# Patient Record
Sex: MALE | Race: BLACK OR AFRICAN AMERICAN | NOT HISPANIC OR LATINO | Employment: UNEMPLOYED | URBAN - NONMETROPOLITAN AREA
[De-identification: names, ages, dates, MRNs, and addresses within clinical notes are randomized per-mention and may not be internally consistent; named-entity substitution may affect disease eponyms.]

---

## 2022-08-04 ENCOUNTER — APPOINTMENT (OUTPATIENT)
Dept: RADIOLOGY | Facility: CLINIC | Age: 32
End: 2022-08-04
Attending: NURSE PRACTITIONER
Payer: MEDICARE

## 2022-08-04 ENCOUNTER — OFFICE VISIT (OUTPATIENT)
Dept: FAMILY MEDICINE | Facility: CLINIC | Age: 32
End: 2022-08-04
Payer: MEDICARE

## 2022-08-04 VITALS
HEART RATE: 89 BPM | SYSTOLIC BLOOD PRESSURE: 114 MMHG | BODY MASS INDEX: 26.66 KG/M2 | OXYGEN SATURATION: 96 % | WEIGHT: 180 LBS | HEIGHT: 69 IN | TEMPERATURE: 98 F | DIASTOLIC BLOOD PRESSURE: 70 MMHG

## 2022-08-04 DIAGNOSIS — R07.81 RIB PAIN ON RIGHT SIDE: ICD-10-CM

## 2022-08-04 DIAGNOSIS — R07.81 RIB PAIN ON RIGHT SIDE: Primary | ICD-10-CM

## 2022-08-04 PROCEDURE — 99203 OFFICE O/P NEW LOW 30 MIN: CPT | Mod: ,,, | Performed by: NURSE PRACTITIONER

## 2022-08-04 PROCEDURE — 71100 XR RIBS 2 VIEW RIGHT: ICD-10-PCS | Mod: 26,RT,, | Performed by: RADIOLOGY

## 2022-08-04 PROCEDURE — 71100 X-RAY EXAM RIBS UNI 2 VIEWS: CPT | Mod: TC,RHCUB,RT | Performed by: NURSE PRACTITIONER

## 2022-08-04 PROCEDURE — 99203 PR OFFICE/OUTPT VISIT, NEW, LEVL III, 30-44 MIN: ICD-10-PCS | Mod: ,,, | Performed by: NURSE PRACTITIONER

## 2022-08-04 PROCEDURE — 71100 X-RAY EXAM RIBS UNI 2 VIEWS: CPT | Mod: 26,RT,, | Performed by: RADIOLOGY

## 2022-08-04 RX ORDER — ONDANSETRON 4 MG/1
4 TABLET, FILM COATED ORAL 3 TIMES DAILY
COMMUNITY
Start: 2022-07-19

## 2022-08-04 RX ORDER — GABAPENTIN 300 MG/1
300 CAPSULE ORAL 3 TIMES DAILY
COMMUNITY
Start: 2022-07-11

## 2022-08-04 RX ORDER — PREDNISONE 10 MG/1
10 TABLET ORAL DAILY
COMMUNITY
Start: 2022-07-29

## 2022-08-04 RX ORDER — QUETIAPINE FUMARATE 200 MG/1
200 TABLET, FILM COATED ORAL NIGHTLY
COMMUNITY
Start: 2022-07-11

## 2022-08-04 RX ORDER — NAPROXEN 500 MG/1
500 TABLET ORAL 2 TIMES DAILY PRN
Qty: 30 TABLET | Refills: 0 | Status: SHIPPED | OUTPATIENT
Start: 2022-08-04

## 2022-08-04 NOTE — LETTER
August 4, 2022      [facility]  [address] [facility] [address]  Phone: [phone]  Fax: [phone]       Patient: Flako Pedroza   YOB: 1990  Date of Visit: 08/04/2022    To Whom It May Concern:    Shani Pedroza  was at [facility] on 08/04/2022. The patient may return to work/school on 08/08/2022 with no restrictions. If you have any questions or concerns, or if I can be of further assistance, please do not hesitate to contact me.    Sincerely,    Romy Arriola CMA
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
## 2022-08-18 ENCOUNTER — OFFICE VISIT (OUTPATIENT)
Dept: FAMILY MEDICINE | Facility: CLINIC | Age: 32
End: 2022-08-18
Payer: MEDICARE

## 2022-08-18 VITALS
DIASTOLIC BLOOD PRESSURE: 62 MMHG | WEIGHT: 184 LBS | HEIGHT: 69 IN | OXYGEN SATURATION: 96 % | BODY MASS INDEX: 27.25 KG/M2 | SYSTOLIC BLOOD PRESSURE: 134 MMHG | HEART RATE: 102 BPM | TEMPERATURE: 99 F

## 2022-08-18 DIAGNOSIS — R07.81 RIB PAIN: ICD-10-CM

## 2022-08-18 DIAGNOSIS — R07.89 ATYPICAL CHEST PAIN: Primary | ICD-10-CM

## 2022-08-18 DIAGNOSIS — D64.9 ANEMIA, UNSPECIFIED TYPE: ICD-10-CM

## 2022-08-18 PROCEDURE — 96372 PR INJECTION,THERAP/PROPH/DIAG2ST, IM OR SUBCUT: ICD-10-PCS | Mod: ,,, | Performed by: NURSE PRACTITIONER

## 2022-08-18 PROCEDURE — 99213 OFFICE O/P EST LOW 20 MIN: CPT | Mod: ,,, | Performed by: NURSE PRACTITIONER

## 2022-08-18 PROCEDURE — 99213 PR OFFICE/OUTPT VISIT, EST, LEVL III, 20-29 MIN: ICD-10-PCS | Mod: ,,, | Performed by: NURSE PRACTITIONER

## 2022-08-18 PROCEDURE — 96372 THER/PROPH/DIAG INJ SC/IM: CPT | Mod: ,,, | Performed by: NURSE PRACTITIONER

## 2022-08-18 RX ORDER — KETOROLAC TROMETHAMINE 30 MG/ML
60 INJECTION, SOLUTION INTRAMUSCULAR; INTRAVENOUS
Status: COMPLETED | OUTPATIENT
Start: 2022-08-18 | End: 2022-08-18

## 2022-08-18 RX ORDER — TRIAMCINOLONE ACETONIDE 40 MG/ML
40 INJECTION, SUSPENSION INTRA-ARTICULAR; INTRAMUSCULAR
Status: COMPLETED | OUTPATIENT
Start: 2022-08-18 | End: 2022-08-18

## 2022-08-18 RX ORDER — KETOROLAC TROMETHAMINE 10 MG/1
10 TABLET, FILM COATED ORAL EVERY 6 HOURS PRN
Qty: 20 TABLET | Refills: 0 | Status: SHIPPED | OUTPATIENT
Start: 2022-08-18 | End: 2022-08-23

## 2022-08-18 RX ORDER — FERROUS SULFATE 325(65) MG
325 TABLET, DELAYED RELEASE (ENTERIC COATED) ORAL
Qty: 30 TABLET | Refills: 0 | Status: SHIPPED | OUTPATIENT
Start: 2022-08-19

## 2022-08-18 RX ADMIN — KETOROLAC TROMETHAMINE 60 MG: 30 INJECTION, SOLUTION INTRAMUSCULAR; INTRAVENOUS at 03:08

## 2022-08-18 RX ADMIN — TRIAMCINOLONE ACETONIDE 40 MG: 40 INJECTION, SUSPENSION INTRA-ARTICULAR; INTRAMUSCULAR at 03:08
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
## 2022-08-18 NOTE — LETTER
August 18, 2022      [facility]  [address] [facility] [address]  Phone: [phone]  Fax: [phone]       Patient: Flako Pedroza   YOB: 1990  Date of Visit: 08/18/2022    To Whom It May Concern:    Shani Pedroza  was at [facility] on 08/18/2022. The patient may return to work/school on 08/22/2022 with no restrictions. If you have any questions or concerns, or if I can be of further assistance, please do not hesitate to contact me.    Sincerely,    Romy Arriola CMA

## 2022-08-26 ENCOUNTER — TELEPHONE (OUTPATIENT)
Dept: FAMILY MEDICINE | Facility: CLINIC | Age: 32
End: 2022-08-26
Payer: MEDICARE

## 2022-08-26 NOTE — TELEPHONE ENCOUNTER
Called no answer no return call. Was not seen by JANIE Gifford for STD. Was seen by Stephany Callaway NP. Patient need to contact [facility] office for further evaluation.

## 2022-08-26 NOTE — TELEPHONE ENCOUNTER
----- Message from Amirah Campos MA sent at 8/25/2022  9:48 AM CDT -----  Patient wants to know if he can come in for a pennicillin shot from a STD was seen by Zay [phone]

## 2023-01-20 ENCOUNTER — HOSPITAL ENCOUNTER (EMERGENCY)
Facility: HOSPITAL | Age: 33
Discharge: HOME | End: 2023-01-20
Attending: EMERGENCY MEDICINE | Admitting: EMERGENCY MEDICINE
Payer: MEDICARE

## 2023-01-20 ENCOUNTER — APPOINTMENT (OUTPATIENT)
Dept: CT IMAGING | Facility: HOSPITAL | Age: 33
End: 2023-01-20
Payer: MEDICARE

## 2023-01-20 VITALS
TEMPERATURE: 98 F | OXYGEN SATURATION: 98 % | DIASTOLIC BLOOD PRESSURE: 78 MMHG | HEART RATE: 77 BPM | SYSTOLIC BLOOD PRESSURE: 114 MMHG | HEIGHT: 69 IN | BODY MASS INDEX: 25.83 KG/M2 | RESPIRATION RATE: 16 BRPM | WEIGHT: 174.38 LBS

## 2023-01-20 DIAGNOSIS — R11.2 NAUSEA AND VOMITING, UNSPECIFIED VOMITING TYPE: ICD-10-CM

## 2023-01-20 DIAGNOSIS — R19.7 DIARRHEA, UNSPECIFIED TYPE: ICD-10-CM

## 2023-01-20 DIAGNOSIS — R10.30 LOWER ABDOMINAL PAIN: ICD-10-CM

## 2023-01-20 DIAGNOSIS — K50.019 CROHN'S DISEASE OF SMALL INTESTINE WITH COMPLICATION: Primary | ICD-10-CM

## 2023-01-20 LAB
ALBUMIN SERPL-MCNC: 3 G/DL (ref 3.5–5.2)
ALBUMIN/GLOB SERPL: 1 G/DL
ALP SERPL-CCNC: 126 U/L (ref 39–117)
ALT SERPL W P-5'-P-CCNC: 10 U/L (ref 1–41)
ANION GAP SERPL CALCULATED.3IONS-SCNC: 8.3 MMOL/L (ref 5–15)
AST SERPL-CCNC: 11 U/L (ref 1–40)
BASOPHILS # BLD AUTO: 0.01 10*3/MM3 (ref 0–0.2)
BASOPHILS NFR BLD AUTO: 0.2 % (ref 0–1.5)
BILIRUB SERPL-MCNC: 0.3 MG/DL (ref 0–1.2)
BILIRUB UR QL STRIP: NEGATIVE
BUN SERPL-MCNC: 19 MG/DL (ref 6–20)
BUN/CREAT SERPL: 14.4 (ref 7–25)
CALCIUM SPEC-SCNC: 8.4 MG/DL (ref 8.6–10.5)
CHLORIDE SERPL-SCNC: 104 MMOL/L (ref 98–107)
CLARITY UR: CLEAR
CO2 SERPL-SCNC: 26.7 MMOL/L (ref 22–29)
COLOR UR: YELLOW
CREAT SERPL-MCNC: 1.32 MG/DL (ref 0.76–1.27)
DEPRECATED RDW RBC AUTO: 52.8 FL (ref 37–54)
EGFRCR SERPLBLD CKD-EPI 2021: 72.6 ML/MIN/1.73
EOSINOPHIL # BLD AUTO: 0.04 10*3/MM3 (ref 0–0.4)
EOSINOPHIL NFR BLD AUTO: 0.6 % (ref 0.3–6.2)
ERYTHROCYTE [DISTWIDTH] IN BLOOD BY AUTOMATED COUNT: 14.9 % (ref 12.3–15.4)
GLOBULIN UR ELPH-MCNC: 3 GM/DL
GLUCOSE SERPL-MCNC: 113 MG/DL (ref 65–99)
GLUCOSE UR STRIP-MCNC: NEGATIVE MG/DL
HCT VFR BLD AUTO: 39.9 % (ref 37.5–51)
HGB BLD-MCNC: 13.1 G/DL (ref 13–17.7)
HGB UR QL STRIP.AUTO: NEGATIVE
HOLD SPECIMEN: NORMAL
HOLD SPECIMEN: NORMAL
IMM GRANULOCYTES # BLD AUTO: 0.02 10*3/MM3 (ref 0–0.05)
IMM GRANULOCYTES NFR BLD AUTO: 0.3 % (ref 0–0.5)
KETONES UR QL STRIP: ABNORMAL
LEUKOCYTE ESTERASE UR QL STRIP.AUTO: NEGATIVE
LIPASE SERPL-CCNC: 6 U/L (ref 13–60)
LYMPHOCYTES # BLD AUTO: 1.33 10*3/MM3 (ref 0.7–3.1)
LYMPHOCYTES NFR BLD AUTO: 21.5 % (ref 19.6–45.3)
MCH RBC QN AUTO: 31.4 PG (ref 26.6–33)
MCHC RBC AUTO-ENTMCNC: 32.8 G/DL (ref 31.5–35.7)
MCV RBC AUTO: 95.7 FL (ref 79–97)
MONOCYTES # BLD AUTO: 0.74 10*3/MM3 (ref 0.1–0.9)
MONOCYTES NFR BLD AUTO: 11.9 % (ref 5–12)
NEUTROPHILS NFR BLD AUTO: 4.06 10*3/MM3 (ref 1.7–7)
NEUTROPHILS NFR BLD AUTO: 65.5 % (ref 42.7–76)
NITRITE UR QL STRIP: NEGATIVE
NRBC BLD AUTO-RTO: 0 /100 WBC (ref 0–0.2)
PH UR STRIP.AUTO: 5.5 [PH] (ref 5–8)
PLATELET # BLD AUTO: 395 10*3/MM3 (ref 140–450)
PMV BLD AUTO: 8.6 FL (ref 6–12)
POTASSIUM SERPL-SCNC: 4.1 MMOL/L (ref 3.5–5.2)
PROT SERPL-MCNC: 6 G/DL (ref 6–8.5)
PROT UR QL STRIP: NEGATIVE
RBC # BLD AUTO: 4.17 10*6/MM3 (ref 4.14–5.8)
SODIUM SERPL-SCNC: 139 MMOL/L (ref 136–145)
SP GR UR STRIP: >1.03 (ref 1–1.03)
UROBILINOGEN UR QL STRIP: ABNORMAL
WBC NRBC COR # BLD: 6.2 10*3/MM3 (ref 3.4–10.8)
WHOLE BLOOD HOLD COAG: NORMAL
WHOLE BLOOD HOLD SPECIMEN: NORMAL

## 2023-01-20 PROCEDURE — 0 IOPAMIDOL PER 1 ML: Performed by: EMERGENCY MEDICINE

## 2023-01-20 PROCEDURE — 36415 COLL VENOUS BLD VENIPUNCTURE: CPT

## 2023-01-20 PROCEDURE — 83690 ASSAY OF LIPASE: CPT

## 2023-01-20 PROCEDURE — 25010000002 ONDANSETRON PER 1 MG: Performed by: NURSE PRACTITIONER

## 2023-01-20 PROCEDURE — 96375 TX/PRO/DX INJ NEW DRUG ADDON: CPT

## 2023-01-20 PROCEDURE — 96374 THER/PROPH/DIAG INJ IV PUSH: CPT

## 2023-01-20 PROCEDURE — 99283 EMERGENCY DEPT VISIT LOW MDM: CPT

## 2023-01-20 PROCEDURE — 80053 COMPREHEN METABOLIC PANEL: CPT

## 2023-01-20 PROCEDURE — 74177 CT ABD & PELVIS W/CONTRAST: CPT

## 2023-01-20 PROCEDURE — 63710000001 PREDNISONE PER 1 MG: Performed by: EMERGENCY MEDICINE

## 2023-01-20 PROCEDURE — 25010000002 MORPHINE PER 10 MG: Performed by: NURSE PRACTITIONER

## 2023-01-20 PROCEDURE — 81003 URINALYSIS AUTO W/O SCOPE: CPT

## 2023-01-20 PROCEDURE — 25010000002 HYDROMORPHONE 1 MG/ML SOLUTION: Performed by: EMERGENCY MEDICINE

## 2023-01-20 PROCEDURE — 85025 COMPLETE CBC W/AUTO DIFF WBC: CPT

## 2023-01-20 RX ORDER — TRAZODONE HYDROCHLORIDE 100 MG/1
300 TABLET ORAL NIGHTLY
COMMUNITY

## 2023-01-20 RX ORDER — METRONIDAZOLE 500 MG/1
500 TABLET ORAL 2 TIMES DAILY
Qty: 14 TABLET | Refills: 0 | Status: SHIPPED | OUTPATIENT
Start: 2023-01-20 | End: 2023-03-15

## 2023-01-20 RX ORDER — QUETIAPINE FUMARATE 300 MG/1
300 TABLET, FILM COATED ORAL NIGHTLY
COMMUNITY

## 2023-01-20 RX ORDER — OMEPRAZOLE 40 MG/1
40 CAPSULE, DELAYED RELEASE ORAL DAILY
COMMUNITY

## 2023-01-20 RX ORDER — ONDANSETRON 4 MG/1
4 TABLET, ORALLY DISINTEGRATING ORAL EVERY 8 HOURS PRN
Qty: 15 TABLET | Refills: 0 | Status: SHIPPED | OUTPATIENT
Start: 2023-01-20

## 2023-01-20 RX ORDER — BUPRENORPHINE HYDROCHLORIDE AND NALOXONE HYDROCHLORIDE DIHYDRATE 8; 2 MG/1; MG/1
1 TABLET SUBLINGUAL DAILY
COMMUNITY
End: 2023-03-15

## 2023-01-20 RX ORDER — ONDANSETRON 2 MG/ML
4 INJECTION INTRAMUSCULAR; INTRAVENOUS ONCE
Status: COMPLETED | OUTPATIENT
Start: 2023-01-20 | End: 2023-01-20

## 2023-01-20 RX ORDER — PREDNISONE 20 MG/1
TABLET ORAL
Qty: 34 TABLET | Refills: 0 | Status: SHIPPED | OUTPATIENT
Start: 2023-01-20 | End: 2023-02-10

## 2023-01-20 RX ORDER — LANOLIN ALCOHOL/MO/W.PET/CERES
6 CREAM (GRAM) TOPICAL NIGHTLY
COMMUNITY

## 2023-01-20 RX ORDER — VENLAFAXINE 75 MG/1
75 TABLET ORAL DAILY
COMMUNITY
End: 2023-03-15

## 2023-01-20 RX ORDER — FAMOTIDINE 40 MG/1
40 TABLET, FILM COATED ORAL DAILY
COMMUNITY
End: 2023-03-15

## 2023-01-20 RX ORDER — SODIUM CHLORIDE 0.9 % (FLUSH) 0.9 %
10 SYRINGE (ML) INJECTION AS NEEDED
Status: DISCONTINUED | OUTPATIENT
Start: 2023-01-20 | End: 2023-01-20 | Stop reason: HOSPADM

## 2023-01-20 RX ORDER — HYDROXYZINE PAMOATE 50 MG/1
50 CAPSULE ORAL DAILY
COMMUNITY

## 2023-01-20 RX ORDER — CIPROFLOXACIN 500 MG/1
500 TABLET, FILM COATED ORAL EVERY 12 HOURS
Qty: 14 TABLET | Refills: 0 | Status: SHIPPED | OUTPATIENT
Start: 2023-01-20 | End: 2023-03-15

## 2023-01-20 RX ORDER — GABAPENTIN 600 MG/1
600 TABLET ORAL 3 TIMES DAILY
COMMUNITY

## 2023-01-20 RX ORDER — CLONIDINE HYDROCHLORIDE 0.2 MG/1
0.2 TABLET ORAL 2 TIMES DAILY PRN
COMMUNITY

## 2023-01-20 RX ORDER — OLANZAPINE 15 MG/1
15 TABLET ORAL NIGHTLY
COMMUNITY
End: 2023-03-15

## 2023-01-20 RX ORDER — ONDANSETRON 4 MG/1
4 TABLET, FILM COATED ORAL EVERY 8 HOURS PRN
COMMUNITY
End: 2023-01-20

## 2023-01-20 RX ORDER — PREDNISONE 20 MG/1
60 TABLET ORAL ONCE
Status: COMPLETED | OUTPATIENT
Start: 2023-01-20 | End: 2023-01-20

## 2023-01-20 RX ADMIN — IOPAMIDOL 100 ML: 755 INJECTION, SOLUTION INTRAVENOUS at 12:23

## 2023-01-20 RX ADMIN — PREDNISONE 60 MG: 20 TABLET ORAL at 16:32

## 2023-01-20 RX ADMIN — HYDROMORPHONE HYDROCHLORIDE 0.5 MG: 1 INJECTION, SOLUTION INTRAMUSCULAR; INTRAVENOUS; SUBCUTANEOUS at 14:48

## 2023-01-20 RX ADMIN — ONDANSETRON 4 MG: 2 INJECTION INTRAMUSCULAR; INTRAVENOUS at 11:42

## 2023-01-20 RX ADMIN — SODIUM CHLORIDE 1000 ML: 9 INJECTION, SOLUTION INTRAVENOUS at 11:41

## 2023-01-20 RX ADMIN — MORPHINE SULFATE 4 MG: 4 INJECTION, SOLUTION INTRAMUSCULAR; INTRAVENOUS at 11:41

## 2023-01-20 NOTE — DISCHARGE INSTRUCTIONS
I am sending you home with oral antibiotics as well as steroids.  Please do a very bland diet or clear liquids for the next couple of days.  Avoid any spicy, greasy, fried or fatty foods.  Drink plenty of fluids and stay well-hydrated.  I spoke with the gastroenterologist who agrees with this plan and expects for you to follow-up with him in his office for further evaluation and management of your Crohn's disease.  Continue your home medications.  I have additionally sent in medications for you to take for treatment.  Return to the emergency department with any new or worsening symptoms.

## 2023-01-20 NOTE — ED PROVIDER NOTES
Time: 11:10 AM EST  Date of encounter:  1/20/2023  Independent Historian/Clinical History and Information was obtained by:   Patient, Chart and Nursing Staff  Chief Complaint: abdominal pain    History is limited by: N/A    History of Present Illness:  Patient is a 32 y.o. year old male with a history of Crohn's disease who presents to the emergency department for evaluation of abdominal pain.    The patient reports he has been experiencing abdominal pain with associated diarrhea and vomiting. He points to his lower abdomen as having the worst pain. He is not followed by a GI specialist for his Crohn's, but states he does take medications. He has been taking Zofran from antiemetic effect. He has not taken his temperature, but reports a subjective fever yesterday. Per bedside nurse, the patient takes omeprazole and famotidine. He is also on suboxone, his last dose having been last night.          Patient Care Team  Primary Care Provider: Provider, No Known    Past Medical History:     No Known Allergies  Past Medical History:   Diagnosis Date   • Crohn's disease (HCC)      Past Surgical History:   Procedure Laterality Date   • COLON SURGERY     • STOMACH SURGERY       History reviewed. No pertinent family history.    Home Medications:  Prior to Admission medications    Not on File        Social History:   Social History     Tobacco Use   • Smoking status: Every Day     Packs/day: 0.50     Types: Cigarettes   Substance Use Topics   • Alcohol use: Not Currently   • Drug use: Not Currently         Review of Systems:  Review of Systems   Constitutional: Positive for fever (subjective). Negative for chills and fatigue.   HENT: Negative for rhinorrhea and sore throat.    Eyes: Negative.    Respiratory: Negative for cough, chest tightness and shortness of breath.    Cardiovascular: Negative for chest pain and palpitations.   Gastrointestinal: Positive for abdominal pain, diarrhea, nausea and vomiting.   Endocrine: Negative.  "   Genitourinary: Negative for decreased urine volume, difficulty urinating, flank pain, frequency, hematuria and urgency.   Musculoskeletal: Negative for arthralgias and myalgias.   Skin: Negative for color change, rash and wound.   Allergic/Immunologic: Negative.    Neurological: Negative for dizziness, syncope, weakness, light-headedness and headaches.   Hematological: Negative.    Psychiatric/Behavioral: Negative for agitation and confusion. The patient is not nervous/anxious.         Physical Exam:  /78 (BP Location: Right arm, Patient Position: Lying)   Pulse 77   Temp 98 °F (36.7 °C) (Oral)   Resp 16   Ht 175.3 cm (69\")   Wt 79.1 kg (174 lb 6.1 oz)   SpO2 98%   BMI 25.75 kg/m²     Physical Exam  Vitals and nursing note reviewed.   Constitutional:       General: He is not in acute distress.     Appearance: Normal appearance. He is not ill-appearing.   HENT:      Head: Normocephalic and atraumatic.      Nose: Nose normal.   Eyes:      Extraocular Movements: Extraocular movements intact.      Pupils: Pupils are equal, round, and reactive to light.   Cardiovascular:      Rate and Rhythm: Normal rate and regular rhythm.      Pulses: Normal pulses.      Heart sounds: Normal heart sounds. No murmur heard.    No gallop.   Pulmonary:      Effort: Pulmonary effort is normal. No respiratory distress.      Breath sounds: Normal breath sounds. No wheezing, rhonchi or rales.   Chest:      Chest wall: No tenderness.   Abdominal:      General: Bowel sounds are normal. There is no distension.      Palpations: Abdomen is soft.      Tenderness: There is generalized abdominal tenderness (Diffuse tenderness, worst in the lower quadrants). There is no guarding or rebound.   Musculoskeletal:         General: No tenderness. Normal range of motion.      Cervical back: Normal range of motion and neck supple.   Skin:     General: Skin is warm and dry.      Findings: No erythema or rash.   Neurological:      Mental Status: " He is alert and oriented to person, place, and time.      Motor: No weakness.   Psychiatric:         Mood and Affect: Mood normal.         Behavior: Behavior normal.                  Procedures:  Procedures      Medical Decision Making:      Comorbidities that affect care:     Crohn's, History of Substance Use    External Notes reviewed:    None      The following orders were placed and all results were independently analyzed by me:  Orders Placed This Encounter   Procedures   • Gastrointestinal Panel, PCR - Stool, Per Rectum   • Clostridioides difficile Toxin - Stool, Per Rectum   • CT Abdomen Pelvis With Contrast   • [place] Draw   • Comprehensive Metabolic Panel   • Lipase   • Urinalysis With Microscopic If Indicated (No Culture) - Urine, Clean Catch   • CBC Auto Differential   • Ambulatory Referral to Gastroenterology   • Undress & Gown   • CBC & Differential   • Green Top (Gel)   • Lavender Top   • Gold Top - SST   • Light Blue Top       Medications Given in the Emergency Department:  Medications   sodium chloride 0.9 % bolus 1,000 mL (0 mL Intravenous Stopped 1/20/23 1435)   morphine injection 4 mg (4 mg Intravenous Given 1/20/23 1141)   ondansetron (ZOFRAN) injection 4 mg (4 mg Intravenous Given 1/20/23 1142)   iopamidol (ISOVUE-370) 76 % injection 100 mL (100 mL Intravenous Given 1/20/23 1223)   HYDROmorphone (DILAUDID) injection 0.5 mg (0.5 mg Intravenous Given 1/20/23 1448)   predniSONE (DELTASONE) tablet 60 mg (60 mg Oral Given 1/20/23 1632)        ED Course:    ED Course as of 01/21/23 1030   Fri Jan 20, 2023   1440 CBC is normal. [AR]   1441 CMP shows mildly elevated creatinine at 1.32.  No previous labs to compare to.  Otherwise overall unremarkable.    Lipase is unremarkable. [AR]   1441 CT of the abdomen and pelvis with contrast revealsFluid-filled distended loops of small bowel throughout the abdomen to the level of the terminal ileum where there is bowel wall thickening and surgical clips related  to prior small bowel resection.  Findings would be most consistent with partial small bowel obstruction.  The thickened terminal ileum would be suspicious for inflammatory bowel disease [AR]   1442 Surgery consulted at this time. [AR]   1513 Spoke with on-call general surgeon, Dr. Mojica, who recommends patient follow-up with GI at this time.  No surgical intervention required based on CT findings from today's visit. [AR]   1555 Spoke with on-call gastroenterologist, Dr. Mason, who agrees with plan of care to send patient home on oral steroids and oral antibiotics and follow-up with him in the office next week.  Additionally, he did recommend ordering stool cultures along with C. difficile prior to discharge.  No further recommendations at this time. [AR]      ED Course User Index  [AR] Janel Ernandez, ZACK       Labs:    Lab Results (last 24 hours)     Procedure Component Value Units Date/Time    CBC & Differential [[id number]]  (Normal) Collected: 01/20/23 1035    Specimen: Blood Updated: 01/20/23 1040    Narrative:      The following orders were created for panel order CBC & Differential.  Procedure                               Abnormality         Status                     ---------                               -----------         ------                     CBC Auto Differential[[id number]]        Normal              Final result                 Please view results for these tests on the individual orders.    Comprehensive Metabolic Panel [[id number]]  (Abnormal) Collected: 01/20/23 1035    Specimen: Blood Updated: 01/20/23 1105     Glucose 113 mg/dL      BUN 19 mg/dL      Creatinine 1.32 mg/dL      Sodium 139 mmol/L      Potassium 4.1 mmol/L      Chloride 104 mmol/L      CO2 26.7 mmol/L      Calcium 8.4 mg/dL      Total Protein 6.0 g/dL      Albumin 3.0 g/dL      ALT (SGPT) 10 U/L      AST (SGOT) 11 U/L      Alkaline Phosphatase 126 U/L      Total Bilirubin 0.3 mg/dL      Globulin 3.0 gm/dL      A/G Ratio  1.0 g/dL      BUN/Creatinine Ratio 14.4     Anion Gap 8.3 mmol/L      eGFR 72.6 mL/min/1.73     Narrative:      GFR Normal >60  Chronic Kidney Disease <60  Kidney Failure <15      Lipase [[id number]]  (Abnormal) Collected: 01/20/23 1035    Specimen: Blood Updated: 01/20/23 1105     Lipase 6 U/L     CBC Auto Differential [[id number]]  (Normal) Collected: 01/20/23 1035    Specimen: Blood Updated: 01/20/23 1040     WBC 6.20 10*3/mm3      RBC 4.17 10*6/mm3      Hemoglobin 13.1 g/dL      Hematocrit 39.9 %      MCV 95.7 fL      MCH 31.4 pg      MCHC 32.8 g/dL      RDW 14.9 %      RDW-SD 52.8 fl      MPV 8.6 fL      Platelets 395 10*3/mm3      Neutrophil % 65.5 %      Lymphocyte % 21.5 %      Monocyte % 11.9 %      Eosinophil % 0.6 %      Basophil % 0.2 %      Immature Grans % 0.3 %      Neutrophils, Absolute 4.06 10*3/mm3      Lymphocytes, Absolute 1.33 10*3/mm3      Monocytes, Absolute 0.74 10*3/mm3      Eosinophils, Absolute 0.04 10*3/mm3      Basophils, Absolute 0.01 10*3/mm3      Immature Grans, Absolute 0.02 10*3/mm3      nRBC 0.0 /100 WBC     Urinalysis With Microscopic If Indicated (No Culture) - Urine, Clean Catch [[id number]]  (Abnormal) Collected: 01/20/23 1450    Specimen: Urine, Clean Catch Updated: 01/20/23 1506     Color, UA Yellow     Appearance, UA Clear     pH, UA 5.5     Specific Gravity, UA >1.030     Glucose, UA Negative     Ketones, UA 15 mg/dL (1+)     Bilirubin, UA Negative     Blood, UA Negative     Protein, UA Negative     Leuk Esterase, UA Negative     Nitrite, UA Negative     Urobilinogen, UA 2.0 E.U./dL    Narrative:      Urine microscopic not indicated.           Imaging:    CT Abdomen Pelvis With Contrast    Result Date: 1/20/2023  PROCEDURE: CT ABDOMEN PELVIS W CONTRAST  COMPARISON: None  INDICATIONS: abdominal pain  TECHNIQUE: After obtaining the patient's consent, CT images were created with non-ionic intravenous contrast material.   PROTOCOL:   Standard imaging protocol performed     RADIATION:   DLP: 415.7 mGy*cm   Automated exposure control was utilized to minimize radiation dose. CONTRAST: 100 cc Isovue 370 I.V.  FINDINGS:  Visualized lung bases are clear.  Liver, pancreas, and spleen are within normal limits.  Bilateral adrenal glands appear normal.  Kidneys appear within normal limits bilaterally.  No hydronephrosis.  Gallbladder is within normal limits.  No evidence of biliary tract obstruction.  No free intraperitoneal fluid.  There are multiple fluid-filled distended loops of small bowel.  There is bowel wall thickening involving the terminal ileum.  Small bowel is fluid-filled and distended to the level of the terminal ileum.  There are surgical clips in the terminal ileum which may be related to partial small bowel resection.  The thickened distal ileum is most likely related to inflammatory bowel disease.  Patient does have a clinical history of Crohn's disease.  There are multiple mildly enlarged mesenteric lymph nodes likely reactive in etiology.  No retroperitoneal adenopathy.  Pelvis:  Urinary bladder is within normal limits.  Distal colon is of normal caliber.  No pelvic or inguinal adenopathy.  No free intraperitoneal fluid.  No lytic or sclerotic bony lesions identified.        1. Fluid-filled distended loops of small bowel throughout the abdomen to the level of the terminal ileum where there is bowel wall thickening and surgical clips related to prior small bowel resection.  Findings would be most consistent with partial small bowel obstruction.  The thickened terminal ileum would be suspicious for inflammatory bowel disease.     ARI KIRKPATRICK MD       Electronically Signed and Approved By: ARI KIRKPATRICK MD on 1/20/2023 at 13:13                 Differential Diagnosis and Discussion:    Abdominal Pain: Based on the patient's signs and symptoms, I considered abdominal aortic aneurysm, small bowel obstruction, pancreatitis, acute cholecystitis, acute appendecitis, peptic ulcer  disease, gastritis, colitis, endocrine disorders, irritable bowel syndrome and other differential diagnosis an etiology of the patient's abdominal pain.    All labs were reviewed and analyzed by me.  CT scan radiology interpretation was reviewed by me.    MDM  Number of Diagnoses or Management Options  Crohn's disease of small intestine with complication (HCC)  Diarrhea, unspecified type  Lower abdominal pain  Nausea and vomiting, unspecified vomiting type  Diagnosis management comments: The patient is resting comfortably and feels better, is alert and in no distress. Repeat examination is unremarkable and benign; in particular, there's no discomfort at McBurney's point and there is no pulsatile mass. The history, exam, diagnostic testing, and current condition does not suggest acute appendicitis, acute cholecystitis, bowel perforation, major gastrointestinal bleeding, severe diverticulitis, abdominal aortic aneurysm, mesenteric ischemia, volvulus, sepsis, or other significant pathology that warrants further testing, continued ED treatment, admission, for surgical evaluation at this point. The vital signs have been stable. The patient does not have uncontrollable pain, intractable vomiting, or other significant symptoms. The patient's condition is stable and appropriate for discharge from the emergency department.       Amount and/or Complexity of Data Reviewed  Clinical lab tests: reviewed and ordered  Tests in the radiology section of CPT®: reviewed and ordered  Tests in the medicine section of CPT®: reviewed and ordered  Review and summarize past medical records: yes  Discuss the patient with other providers: yes (Spoke with both on-call general surgeon as well as on-call GI regarding findings on CAT scan.  No recommendations for admission or surgical intervention at this time.  Patient can follow-up as outpatient with GI)  Independent visualization of images, tracings, or specimens: yes    Risk of Complications,  Morbidity, and/or Mortality  Presenting problems: moderate  Diagnostic procedures: moderate  Management options: moderate    Patient Progress  Patient progress: stable (15:18 EST: Patient rechecked. Updated the patient on his results and plan for discharge with referral to see GI as an out-patient. Will give the patient a dose of steroid prior to discharge. He expressed understanding and agreement. All questions were answered at this time. )           Patient Care Considerations:    NARCOTICS: I considered prescribing opiate pain medication as an outpatient, however patient has long standing history of substance use      Consultants/Shared Management Plan:    Consultant: I have discussed the case with on-call GI, Dr. Mason who states He agrees with plan of care to discharge patient home and follow-up as outpatient in his office next week.  We will send the patient home with oral steroids tapering dose, recommends clear liquid diet, stool cultures, and oral antibiotics.    Social Determinants of Health:    Patient is independent, reliable, and has access to care.       Disposition and Care Coordination:    Discharged: I considered escalation of care by admitting this patient for observation, however the patient has improved and is suitable and  stable for discharge.    I have explained discharge medications and the need for follow up with the patient/caretakers. This was also printed in the discharge instructions. Patient was discharged with the following medications and follow up:      Medication List      New Prescriptions    ciprofloxacin 500 MG tablet  Commonly known as: CIPRO  Take 1 tablet by mouth Every 12 (Twelve) Hours.     metroNIDAZOLE 500 MG tablet  Commonly known as: FLAGYL  Take 1 tablet by mouth 2 (Two) Times a Day.     ondansetron ODT 4 MG disintegrating tablet  Commonly known as: ZOFRAN-ODT  Place 1 tablet on the tongue Every 8 (Eight) Hours As Needed for Nausea or Vomiting.     predniSONE 20 MG  tablet  Commonly known as: DELTASONE  Take 2 tablets by mouth Daily for 14 days, THEN 1 tablet Daily for 5 days, THEN 0.5 tablets Daily for 2 days.  Start taking on: January 20, 2023           Where to Get Your Medications      These medications were sent to [facility] - [place] [address] - [phone]  - [phone] FX  [address], [place] [address]    Phone: [phone]   · ciprofloxacin 500 MG tablet  · metroNIDAZOLE 500 MG tablet  · ondansetron ODT 4 MG disintegrating tablet  · predniSONE 20 MG tablet      Valentin Mason MD  [address]  [facility] [address]  [phone]    Schedule an appointment as soon as possible for a visit       [facility] EMERGENCY ROOM  [address] [address] [phone]  Go to   As needed, If symptoms worsen       Final diagnoses:   Crohn's disease of small intestine with complication (HCC)   Lower abdominal pain   Diarrhea, unspecified type   Nausea and vomiting, unspecified vomiting type        ED Disposition     ED Disposition   Discharge    Condition   Stable    Comment   --             This medical record created using voice recognition software.    I, ZACK Bullock, am scribing for and in the presence of No att. providers found. 1/21/2023 10:30 EST    I, No att. providers found, personally performed the services described in this documentation, as scribed by ZACK Bullock in my presence, and it is both accurate and complete.        Jeni Carroll  01/20/23 1148       Jeni Carroll  01/20/23 1520       Jeni Carroll  01/20/23 1532       Janel Ernandez APRN  01/21/23 1030

## 2023-01-20 NOTE — Clinical Note
[facility] EMERGENCY ROOM  [address]  [place] [address]  Phone: [phone]    Abhijeet Pitts was seen and treated in our emergency department on 1/20/2023.  He may return to work on 01/23/2023.         Thank you for choosing [facility].    Janel Ernandez, APRN

## 2023-01-25 ENCOUNTER — OFFICE VISIT (OUTPATIENT)
Dept: GASTROENTEROLOGY | Facility: CLINIC | Age: 33
End: 2023-01-25
Payer: COMMERCIAL

## 2023-01-25 ENCOUNTER — PREP FOR SURGERY (OUTPATIENT)
Dept: OTHER | Facility: HOSPITAL | Age: 33
End: 2023-01-25
Payer: MEDICARE

## 2023-01-25 VITALS
WEIGHT: 196.2 LBS | HEART RATE: 91 BPM | DIASTOLIC BLOOD PRESSURE: 63 MMHG | BODY MASS INDEX: 29.06 KG/M2 | HEIGHT: 69 IN | SYSTOLIC BLOOD PRESSURE: 119 MMHG

## 2023-01-25 DIAGNOSIS — K50.019 CROHN'S DISEASE OF SMALL INTESTINE WITH COMPLICATION: ICD-10-CM

## 2023-01-25 DIAGNOSIS — R19.7 DIARRHEA, UNSPECIFIED TYPE: Primary | ICD-10-CM

## 2023-01-25 DIAGNOSIS — R15.9 INCONTINENCE OF FECES WITH FECAL URGENCY: ICD-10-CM

## 2023-01-25 DIAGNOSIS — R15.2 INCONTINENCE OF FECES WITH FECAL URGENCY: ICD-10-CM

## 2023-01-25 DIAGNOSIS — R10.84 GENERALIZED ABDOMINAL PAIN: ICD-10-CM

## 2023-01-25 DIAGNOSIS — R93.3 ABNORMAL CT SCAN, SMALL BOWEL: ICD-10-CM

## 2023-01-25 PROCEDURE — 99214 OFFICE O/P EST MOD 30 MIN: CPT | Performed by: NURSE PRACTITIONER

## 2023-01-25 RX ORDER — POLYETHYLENE GLYCOL 3350, SODIUM SULFATE ANHYDROUS, SODIUM BICARBONATE, SODIUM CHLORIDE, POTASSIUM CHLORIDE 227.1; 21.5; 6.36; 5.53; .754 G/L; G/L; G/L; G/L; G/L
4 POWDER, FOR SOLUTION ORAL DAILY
Qty: 1 EACH | Refills: 0 | Status: SHIPPED | OUTPATIENT
Start: 2023-01-25 | End: 2023-01-26

## 2023-01-25 NOTE — PROGRESS NOTES
Patient Name: Abhijeet Pitts   Visit Date: 01/25/2023   Patient ID: [id number]  Provider: ZACK Barreto    Sex: male  Location:  Location Address:  Location Phone: [phone]  [place] [address] [phone]    YOB: 1990  Age: 32 y.o.   Primary Care Provider Provider, No Known      Referring Provider: ZACK Bullock        Chief Complaint  Abdominal Pain (Lower ABD pain, intermittent ) and Diarrhea (Having 3-4 Bms, 5 Bms during the night. Having incontinence, liquid)    History of Present Illness  New pt presents to f/u from ER visit. Pt states he was dx w Crohn's at age 16. Has been given antibiotics/steroids but biologic naiive. Pt reports hx of fistulas, has had small bowel resection x 2 -3 times per pt, and was seen by colorectal surgeon GI about 8 yrs in [place], hasn't seen GI since then. Pt appears to have tried to deal with symptoms without any treatment until he could not handle any more, prompting ER visit.     Pt states he has abd pain w meals, states sx x months,  bloating, has liquid stool, states cannot control and having FI. Having about 10 stools/d, + nocturnal stools. No blood seen in stool.  No fever. Had vomiting prior to ER visit, none since then. Some improvement w prednisone. Also given Flagyl/Cipro    ER visit 1/20/23: labs below; CT of the abdomen and pelvis with contrast 1/20/2023: Liver pancreas and spleen are within normal limits, multiple fluid-filled distended loops of small bowel, there is bowel wall thickening involving the terminal ileum, small bowel is fluid-filled and distended to the level of the terminal ileum, clips noted may be related to small bowel resection--findings c/w partial SBO; multiple mildly enlarged mesenteric lymph nodes likely reactive in etiology, no retroperitoneal adenopathy  Past Medical History:   Diagnosis Date   • Crohn's disease (HCC)        Past Surgical History:   Procedure Laterality Date   • COLON SURGERY     • STOMACH  "SURGERY         Allergies   Allergen Reactions   • Elemental Sulfur Swelling   • Naloxone Swelling   • Tylenol [Acetaminophen] Swelling       Family History   Problem Relation Age of Onset   • Colon cancer Neg Hx         Social History     Tobacco Use   • Smoking status: Every Day     Packs/day: 0.50     Types: Cigarettes   Vaping Use   • Vaping Use: Never used   Substance Use Topics   • Alcohol use: Not Currently   • Drug use: Not Currently       Objective     Vital Signs:   /63 (BP Location: Right arm, Patient Position: Sitting, Cuff Size: Adult)   Pulse 91   Ht 175.3 cm (69\")   Wt 89 kg (196 lb 3.2 oz)   BMI 28.97 kg/m²       Physical Exam  Constitutional:       General: The patient is not in acute distress.     Appearance: Normal appearance.   HENT:      Head: Normocephalic and atraumatic.      Nose: Nose normal.   Pulmonary:      Effort: Pulmonary effort is normal. No respiratory distress.   Abdominal:      General: Abdomen is flat.      Palpations: Abdomen is soft. There is no mass.      Tenderness: Pt has generalized tenderness. There is no guarding.   Musculoskeletal:      Cervical back: Neck supple.      Right lower leg: No edema.      Left lower leg: No edema.   Skin:     General: Skin is warm and dry.   Neurological:      General: No focal deficit present.      Mental Status: The patient is alert and oriented to person, place, and time.      Gait: Gait normal.   Psychiatric:         Mood and Affect: Mood normal.         Speech: Speech normal.         Behavior: Behavior normal.         Thought Content: Thought content normal.     Result Review :   The following data was reviewed by: ZACK Barreto on 01/25/2023:    CBC w/diff    CBC w/Diff 1/20/23   WBC 6.20   RBC 4.17   Hemoglobin 13.1   Hematocrit 39.9   MCV 95.7   MCH 31.4   MCHC 32.8   RDW 14.9   Platelets 395   Neutrophil Rel % 65.5   Immature Granulocyte Rel % 0.3   Lymphocyte Rel % 21.5   Monocyte Rel % 11.9   Eosinophil Rel " % 0.6   Basophil Rel % 0.2           CMP    CMP 1/20/23   Glucose 113 (A)   BUN 19   Creatinine 1.32 (A)   eGFR 72.6   Sodium 139   Potassium 4.1   Chloride 104   Calcium 8.4 (A)   Total Protein 6.0   Albumin 3.0 (A)   Globulin 3.0   Total Bilirubin 0.3   Alkaline Phosphatase 126 (A)   AST (SGOT) 11   ALT (SGPT) 10   Albumin/Globulin Ratio 1.0   BUN/Creatinine Ratio 14.4   Anion Gap 8.3   (A) Abnormal value                          Assessment and Plan    Diagnoses and all orders for this visit:    1. Diarrhea, unspecified type (Primary)  -     Clostridioides difficile Toxin - Stool, Per Rectum; Future  -     Enteric Bacterial Panel - Stool, Per Rectum; Future  -     Enteric Parasite Panel - Stool, Per Rectum; Future    2. Crohn's disease of small intestine with complication (HCC)  -     Hepatitis B Core Antibody, Total; Future  -     QuantiFERON TB Gold; Future  -     Histoplasma Ag Ur - Urine, Urine, Clean Catch; Future  -     Hepatitis B Surface Antigen; Future  -     Hepatitis B Surface Antibody; Future    3. Incontinence of feces with fecal urgency    4. Abnormal CT scan, small bowel    Other orders  -     PEG 3350-KCl-NaBcb-NaCl-NaSulf (Golytely) 227.1 g pack; Take 4 L by mouth Daily for 1 day. Take per office instructions  Dispense: 1 each; Refill: 0              Follow Up   Return for follow up after procedure.   Check Stool cdiff and other stools  Complete steroid taper and antibiotics as given by ER  Increase liquids to avoid dehydration; urine from ER suggested dehydration, creatinine increased  Recommended supplementing also w Ensure and liquid nutritional supplements, low albumin noted. Coupons given.   Low residue diet   Please make appt for PCP establishment within [facility] per pt request  Colonoscopy Surgical Risk and Benefits: Possible risks/complications, benefits, and alternatives to surgical or invasive procedure have been explained to patient and/or legal guardian; risks include  bleeding, infection, and perforation. Patient has been evaluated and can tolerate anesthesia and/or sedation. Risks, benefits, and alternatives to anesthesia and sedation have been explained to patient and/or legal guardian. Within 1 month  R/W pt reasons to go to ER -- if worsening abd pain, vomiting, or fever, please return. Pt agreeable to plan.  Suspect pt will need biologics, ordering labs required prior to treatment. D/w pt today risks of biologic drugs such as to include increased risk of infection and malignancy. We discussed trying either Remicade or Stelara, await colonoscopy result. D/W pt need for his compliance and commitment with these types of treatments.      Patient was given instructions and counseling regarding his condition or for health maintenance advice. Please see specific information pulled into the AVS if appropriate.

## 2023-02-09 ENCOUNTER — TELEPHONE (OUTPATIENT)
Dept: GASTROENTEROLOGY | Facility: CLINIC | Age: 33
End: 2023-02-09
Payer: MEDICARE

## 2023-02-09 NOTE — TELEPHONE ENCOUNTER
Pt called with c/o lower ABD pain with meals, liquid diarrhea (2-3 BMs daily) with some incontinence/urgency. Pt denies N/V and Blood in stool.     Pt is wondering if the same medications that were prescribed in the ER could be sent in again, pt has Colon on 2.14.23. Pt is out of antibiotics/steriods.

## 2023-02-09 NOTE — TELEPHONE ENCOUNTER
I ordered stools at initial visit, request pt do these , and labs. If abd pain worsens or pt has n/v, or fever, please go to ER.   Recommend low residue diet

## 2023-02-14 ENCOUNTER — ANESTHESIA (OUTPATIENT)
Dept: GASTROENTEROLOGY | Facility: HOSPITAL | Age: 33
End: 2023-02-14
Payer: MEDICARE

## 2023-02-14 ENCOUNTER — ANESTHESIA EVENT (OUTPATIENT)
Dept: GASTROENTEROLOGY | Facility: HOSPITAL | Age: 33
End: 2023-02-14
Payer: MEDICARE

## 2023-02-14 ENCOUNTER — HOSPITAL ENCOUNTER (OUTPATIENT)
Facility: HOSPITAL | Age: 33
Setting detail: HOSPITAL OUTPATIENT SURGERY
Discharge: HOME | End: 2023-02-14
Attending: INTERNAL MEDICINE | Admitting: INTERNAL MEDICINE
Payer: MEDICARE

## 2023-02-14 VITALS
DIASTOLIC BLOOD PRESSURE: 66 MMHG | BODY MASS INDEX: 26.05 KG/M2 | SYSTOLIC BLOOD PRESSURE: 98 MMHG | TEMPERATURE: 97.5 F | HEART RATE: 88 BPM | WEIGHT: 176.37 LBS | OXYGEN SATURATION: 96 % | RESPIRATION RATE: 15 BRPM

## 2023-02-14 PROCEDURE — 25010000002 PROPOFOL 10 MG/ML EMULSION: Performed by: NURSE ANESTHETIST, CERTIFIED REGISTERED

## 2023-02-14 PROCEDURE — 45378 DIAGNOSTIC COLONOSCOPY: CPT | Performed by: INTERNAL MEDICINE

## 2023-02-14 RX ORDER — LIDOCAINE HYDROCHLORIDE 20 MG/ML
INJECTION, SOLUTION EPIDURAL; INFILTRATION; INTRACAUDAL; PERINEURAL AS NEEDED
Status: DISCONTINUED | OUTPATIENT
Start: 2023-02-14 | End: 2023-02-14 | Stop reason: SURG

## 2023-02-14 RX ORDER — SODIUM CHLORIDE, SODIUM LACTATE, POTASSIUM CHLORIDE, CALCIUM CHLORIDE 600; 310; 30; 20 MG/100ML; MG/100ML; MG/100ML; MG/100ML
30 INJECTION, SOLUTION INTRAVENOUS CONTINUOUS
Status: DISCONTINUED | OUTPATIENT
Start: 2023-02-14 | End: 2023-02-14 | Stop reason: HOSPADM

## 2023-02-14 RX ORDER — PROPOFOL 10 MG/ML
VIAL (ML) INTRAVENOUS AS NEEDED
Status: DISCONTINUED | OUTPATIENT
Start: 2023-02-14 | End: 2023-02-14 | Stop reason: SURG

## 2023-02-14 RX ADMIN — SODIUM CHLORIDE, POTASSIUM CHLORIDE, SODIUM LACTATE AND CALCIUM CHLORIDE 30 ML/HR: 600; 310; 30; 20 INJECTION, SOLUTION INTRAVENOUS at 11:19

## 2023-02-14 RX ADMIN — LIDOCAINE HYDROCHLORIDE 100 MG: 20 INJECTION, SOLUTION EPIDURAL; INFILTRATION; INTRACAUDAL; PERINEURAL at 12:21

## 2023-02-14 RX ADMIN — PROPOFOL 100 MG: 10 INJECTION, EMULSION INTRAVENOUS at 12:21

## 2023-02-14 RX ADMIN — PROPOFOL 200 MCG/KG/MIN: 10 INJECTION, EMULSION INTRAVENOUS at 12:21

## 2023-02-14 NOTE — H&P
Pre Procedure History & Physical    Chief Complaint:   Diarrhea, abdominal pain, history of Crohn's    Subjective     HPI:   Diarrhea, diffuse abdominal pain, history of Crohn's    Past Medical History:   Past Medical History:   Diagnosis Date   • Crohn's disease (HCC)        Past Surgical History:  Past Surgical History:   Procedure Laterality Date   • COLON SURGERY     • STOMACH SURGERY         Family History:  Family History   Problem Relation Age of Onset   • Colon cancer Neg Hx        Social History:   reports that he has been smoking cigarettes. He has been smoking an average of .5 packs per day. He does not have any smokeless tobacco history on file. He reports that he does not currently use alcohol. He reports that he does not currently use drugs.    Medications:   Medications Prior to Admission   Medication Sig Dispense Refill Last Dose   • buprenorphine-naloxone (SUBOXONE) 8-2 MG per SL tablet Place 1 tablet under the tongue Daily.      • ciprofloxacin (CIPRO) 500 MG tablet Take 1 tablet by mouth Every 12 (Twelve) Hours. 14 tablet 0    • cloNIDine (CATAPRES) 0.2 MG tablet Take 0.2 mg by mouth 2 (Two) Times a Day.      • famotidine (PEPCID) 40 MG tablet Take 40 mg by mouth Daily.      • gabapentin (NEURONTIN) 600 MG tablet Take 600 mg by mouth 3 (Three) Times a Day.      • hydrOXYzine pamoate (VISTARIL) 50 MG capsule Take 50 mg by mouth 3 (Three) Times a Day As Needed for Itching.      • melatonin 1 MG tablet Take 3 mg by mouth.      • metroNIDAZOLE (FLAGYL) 500 MG tablet Take 1 tablet by mouth 2 (Two) Times a Day. 14 tablet 0    • OLANZapine (zyPREXA) 15 MG tablet Take 15 mg by mouth Every Night.      • omeprazole (priLOSEC) 40 MG capsule Take 40 mg by mouth Daily.      • ondansetron ODT (ZOFRAN-ODT) 4 MG disintegrating tablet Place 1 tablet on the tongue Every 8 (Eight) Hours As Needed for Nausea or Vomiting. 15 tablet 0    • QUEtiapine (SEROquel) 300 MG tablet Take 300 mg by mouth Every Night.      •  traZODone (DESYREL) 100 MG tablet Take 300 mg by mouth Every Night.      • venlafaxine (EFFEXOR) 75 MG tablet Take 75 mg by mouth Daily.          Allergies:  Elemental sulfur, Naloxone, and Tylenol [acetaminophen]        Objective     Blood pressure 100/77, pulse 100, temperature 97.9 °F (36.6 °C), temperature source Temporal, resp. rate 16, weight 80 kg (176 lb 5.9 oz), SpO2 94 %.    Physical Exam   Constitutional: Pt is oriented to person, place, and time and well-developed, well-nourished, and in no distress.   Mouth/Throat: Oropharynx is clear and moist.   Neck: Normal range of motion.   Cardiovascular: Normal rate, regular rhythm and normal heart sounds.    Pulmonary/Chest: Effort normal and breath sounds normal.   Abdominal: Soft. Nontender  Skin: Skin is warm and dry.   Psychiatric: Mood, memory, affect and judgment normal.     Assessment & Plan     Diagnosis:  History of Crohn's, abdominal pain, diarrhea    Anticipated Surgical Procedure:  Colonoscopy    The risks, benefits, and alternatives of this procedure have been discussed with the patient or the responsible party- the patient understands and agrees to proceed.

## 2023-02-14 NOTE — NURSING NOTE
Attempted to call patients ride home with phone number provided by patient to update family and let family know patient is ready to be discharged. Phone number provided is not in service. Asked patient f there was another number, patient stated he would text them. Instructed patient to let this RN know when his ride was here and that we would take him downstairs to his ride. Patient stated he wanted to go to the cafeteria. Instructed patient he could not leave the floor, due to sedation that he had for procedure and that one of the staff in endo would have to escort him down to his ride and asked him to let us know when his ride was here. Informed charge nurse Ruth of situation. Ruth RN went to talk to patient, patient walked out of endo suite with MERARY Miranda.

## 2023-02-14 NOTE — ANESTHESIA POSTPROCEDURE EVALUATION
Patient: Abhijeet Pitts    Procedure Summary     Date: 02/14/23 Room / Location: [facility] ENDOSCOPY 4 / [facility] ENDOSCOPY    Anesthesia Start: 1218 Anesthesia Stop: 1239    Procedure: SIGMOIDOSCOPY FLEXIBLE Diagnosis:       Diarrhea, unspecified type      Crohn's disease of small intestine with complication (HCC)      Generalized abdominal pain      (Diarrhea, unspecified type [R19.7])      (Crohn's disease of small intestine with complication (HCC) [K50.019])      (Generalized abdominal pain [R10.84])    Surgeons: Valentin Mason MD Provider: Mann Yu MD    Anesthesia Type: general ASA Status: 2          Anesthesia Type: general    Vitals  Vitals Value Taken Time   BP 98/66 02/14/23 1253   Temp 36.4 °C (97.5 °F) 02/14/23 1253   Pulse 85 02/14/23 1258   Resp 15 02/14/23 1253   SpO2 91 % 02/14/23 1256   Vitals shown include unvalidated device data.        Post Anesthesia Care and Evaluation    Patient location during evaluation: bedside  Patient participation: complete - patient participated  Level of consciousness: awake  Pain management: adequate    Airway patency: patent  Anesthetic complications: No anesthetic complications  PONV Status: none  Cardiovascular status: acceptable and stable  Respiratory status: acceptable  Hydration status: acceptable    Comments: An Anesthesiologist personally participated in the most demanding procedures (including induction and emergence if applicable) in the anesthesia plan, monitored the course of anesthesia administration at frequent intervals and remained physically present and available for immediate diagnosis and treatment of emergencies.

## 2023-02-14 NOTE — NURSING NOTE
Nurse Pamella let me know that patient and talked about leaving without ride here. I went to speak with the patient to see what I could do to make him more comfortable. He grabbed his backpack and begin to walk towards the elevator. Stated he was going to get food and leave. I told Tahmina to call security. We got on the elevator and he went to the cafe. As we was leaving the cafe security showed up we walked patient towards the [facility] entrance. There the patient sit in a chair and security stated they could watch him on a camera and then they left. I set with the patient and told the patient we can go back upstairs and wait on the his ride or he can leave AMA. He stated he would leave AMA. Nilesh Capellan was notified. Was working on filling papers out when his ride arrived. Patient got in the back seat of the van.

## 2023-02-22 ENCOUNTER — TELEPHONE (OUTPATIENT)
Dept: GASTROENTEROLOGY | Facility: CLINIC | Age: 33
End: 2023-02-22
Payer: MEDICARE

## 2023-02-28 ENCOUNTER — TELEPHONE (OUTPATIENT)
Dept: GASTROENTEROLOGY | Facility: CLINIC | Age: 33
End: 2023-02-28
Payer: MEDICARE

## 2023-02-28 NOTE — TELEPHONE ENCOUNTER
Called pt to follow up on overdue results for labs/stool studies. Pt is agreeable to completing tests.

## 2023-03-08 ENCOUNTER — APPOINTMENT (OUTPATIENT)
Dept: CT IMAGING | Facility: HOSPITAL | Age: 33
End: 2023-03-08
Payer: MEDICARE

## 2023-03-08 ENCOUNTER — APPOINTMENT (OUTPATIENT)
Dept: GENERAL RADIOLOGY | Facility: HOSPITAL | Age: 33
End: 2023-03-08
Payer: MEDICARE

## 2023-03-08 ENCOUNTER — HOSPITAL ENCOUNTER (EMERGENCY)
Facility: HOSPITAL | Age: 33
Discharge: HOME | End: 2023-03-08
Attending: EMERGENCY MEDICINE | Admitting: EMERGENCY MEDICINE
Payer: MEDICARE

## 2023-03-08 ENCOUNTER — HOSPITAL ENCOUNTER (EMERGENCY)
Facility: HOSPITAL | Age: 33
Discharge: LEFT BEFORE DISCHARGE | End: 2023-03-08
Attending: EMERGENCY MEDICINE | Admitting: EMERGENCY MEDICINE
Payer: MEDICARE

## 2023-03-08 VITALS
HEART RATE: 85 BPM | WEIGHT: 180 LBS | DIASTOLIC BLOOD PRESSURE: 68 MMHG | SYSTOLIC BLOOD PRESSURE: 105 MMHG | OXYGEN SATURATION: 96 % | HEIGHT: 69 IN | RESPIRATION RATE: 18 BRPM | BODY MASS INDEX: 26.66 KG/M2 | TEMPERATURE: 98 F

## 2023-03-08 VITALS
DIASTOLIC BLOOD PRESSURE: 63 MMHG | HEIGHT: 69 IN | SYSTOLIC BLOOD PRESSURE: 118 MMHG | RESPIRATION RATE: 20 BRPM | HEART RATE: 83 BPM | OXYGEN SATURATION: 100 % | WEIGHT: 179.9 LBS | TEMPERATURE: 97.3 F | BODY MASS INDEX: 26.64 KG/M2

## 2023-03-08 DIAGNOSIS — R60.9 EDEMA, UNSPECIFIED TYPE: Primary | ICD-10-CM

## 2023-03-08 DIAGNOSIS — R19.7 DIARRHEA, UNSPECIFIED TYPE: ICD-10-CM

## 2023-03-08 DIAGNOSIS — R10.84 GENERALIZED ABDOMINAL PAIN: ICD-10-CM

## 2023-03-08 DIAGNOSIS — R60.9 PERIPHERAL EDEMA: Primary | ICD-10-CM

## 2023-03-08 LAB
ALBUMIN SERPL-MCNC: 1.9 G/DL (ref 3.5–5.2)
ALBUMIN/GLOB SERPL: 0.8 G/DL
ALP SERPL-CCNC: 114 U/L (ref 39–117)
ALT SERPL W P-5'-P-CCNC: 15 U/L (ref 1–41)
ANION GAP SERPL CALCULATED.3IONS-SCNC: 4.4 MMOL/L (ref 5–15)
AST SERPL-CCNC: 13 U/L (ref 1–40)
BASOPHILS # BLD AUTO: 0.01 10*3/MM3 (ref 0–0.2)
BASOPHILS NFR BLD AUTO: 0.1 % (ref 0–1.5)
BILIRUB SERPL-MCNC: 0.2 MG/DL (ref 0–1.2)
BUN SERPL-MCNC: 13 MG/DL (ref 6–20)
BUN/CREAT SERPL: 15.7 (ref 7–25)
CALCIUM SPEC-SCNC: 7.3 MG/DL (ref 8.6–10.5)
CHLORIDE SERPL-SCNC: 111 MMOL/L (ref 98–107)
CO2 SERPL-SCNC: 26.6 MMOL/L (ref 22–29)
CREAT SERPL-MCNC: 0.83 MG/DL (ref 0.76–1.27)
DEPRECATED RDW RBC AUTO: 46 FL (ref 37–54)
EGFRCR SERPLBLD CKD-EPI 2021: 117.8 ML/MIN/1.73
EOSINOPHIL # BLD AUTO: 0.03 10*3/MM3 (ref 0–0.4)
EOSINOPHIL NFR BLD AUTO: 0.4 % (ref 0.3–6.2)
ERYTHROCYTE [DISTWIDTH] IN BLOOD BY AUTOMATED COUNT: 13.6 % (ref 12.3–15.4)
GLOBULIN UR ELPH-MCNC: 2.3 GM/DL
GLUCOSE SERPL-MCNC: 86 MG/DL (ref 65–99)
HCT VFR BLD AUTO: 31.6 % (ref 37.5–51)
HGB BLD-MCNC: 10.5 G/DL (ref 13–17.7)
HOLD SPECIMEN: NORMAL
HOLD SPECIMEN: NORMAL
IMM GRANULOCYTES # BLD AUTO: 0.03 10*3/MM3 (ref 0–0.05)
IMM GRANULOCYTES NFR BLD AUTO: 0.4 % (ref 0–0.5)
LIPASE SERPL-CCNC: 6 U/L (ref 13–60)
LYMPHOCYTES # BLD AUTO: 1.3 10*3/MM3 (ref 0.7–3.1)
LYMPHOCYTES NFR BLD AUTO: 16.6 % (ref 19.6–45.3)
MCH RBC QN AUTO: 31 PG (ref 26.6–33)
MCHC RBC AUTO-ENTMCNC: 33.2 G/DL (ref 31.5–35.7)
MCV RBC AUTO: 93.2 FL (ref 79–97)
MONOCYTES # BLD AUTO: 0.71 10*3/MM3 (ref 0.1–0.9)
MONOCYTES NFR BLD AUTO: 9.1 % (ref 5–12)
NEUTROPHILS NFR BLD AUTO: 5.73 10*3/MM3 (ref 1.7–7)
NEUTROPHILS NFR BLD AUTO: 73.4 % (ref 42.7–76)
NRBC BLD AUTO-RTO: 0 /100 WBC (ref 0–0.2)
NT-PROBNP SERPL-MCNC: 168 PG/ML (ref 0–450)
PLATELET # BLD AUTO: 295 10*3/MM3 (ref 140–450)
PMV BLD AUTO: 9.6 FL (ref 6–12)
POTASSIUM SERPL-SCNC: 3.8 MMOL/L (ref 3.5–5.2)
PROT SERPL-MCNC: 4.2 G/DL (ref 6–8.5)
RBC # BLD AUTO: 3.39 10*6/MM3 (ref 4.14–5.8)
SODIUM SERPL-SCNC: 142 MMOL/L (ref 136–145)
TROPONIN T SERPL HS-MCNC: <6 NG/L
WBC NRBC COR # BLD: 7.81 10*3/MM3 (ref 3.4–10.8)
WHOLE BLOOD HOLD COAG: NORMAL
WHOLE BLOOD HOLD SPECIMEN: NORMAL

## 2023-03-08 PROCEDURE — 36415 COLL VENOUS BLD VENIPUNCTURE: CPT

## 2023-03-08 PROCEDURE — 83880 ASSAY OF NATRIURETIC PEPTIDE: CPT

## 2023-03-08 PROCEDURE — 85025 COMPLETE CBC W/AUTO DIFF WBC: CPT

## 2023-03-08 PROCEDURE — 93010 ELECTROCARDIOGRAM REPORT: CPT | Performed by: SPECIALIST

## 2023-03-08 PROCEDURE — 99281 EMR DPT VST MAYX REQ PHY/QHP: CPT

## 2023-03-08 PROCEDURE — 71045 X-RAY EXAM CHEST 1 VIEW: CPT

## 2023-03-08 PROCEDURE — 83690 ASSAY OF LIPASE: CPT

## 2023-03-08 PROCEDURE — 93005 ELECTROCARDIOGRAM TRACING: CPT

## 2023-03-08 PROCEDURE — 99282 EMERGENCY DEPT VISIT SF MDM: CPT

## 2023-03-08 PROCEDURE — 80053 COMPREHEN METABOLIC PANEL: CPT

## 2023-03-08 PROCEDURE — 84484 ASSAY OF TROPONIN QUANT: CPT

## 2023-03-08 RX ORDER — FUROSEMIDE 20 MG/1
20 TABLET ORAL ONCE
Status: COMPLETED | OUTPATIENT
Start: 2023-03-08 | End: 2023-03-08

## 2023-03-08 RX ORDER — FUROSEMIDE 20 MG/1
20 TABLET ORAL DAILY
Qty: 4 TABLET | Refills: 0 | Status: SHIPPED | OUTPATIENT
Start: 2023-03-08 | End: 2023-03-15

## 2023-03-08 RX ORDER — SODIUM CHLORIDE 0.9 % (FLUSH) 0.9 %
10 SYRINGE (ML) INJECTION AS NEEDED
Status: DISCONTINUED | OUTPATIENT
Start: 2023-03-08 | End: 2023-03-08 | Stop reason: HOSPADM

## 2023-03-08 RX ORDER — DICYCLOMINE HCL 20 MG
20 TABLET ORAL EVERY 6 HOURS PRN
Qty: 20 TABLET | Refills: 0 | Status: SHIPPED | OUTPATIENT
Start: 2023-03-08

## 2023-03-08 RX ADMIN — FUROSEMIDE 20 MG: 20 TABLET ORAL at 20:17

## 2023-03-08 NOTE — ED PROVIDER NOTES
Time: 5:36 PM EST  Date of encounter:  3/8/2023  Independent Historian/Clinical History and Information was obtained by:   Patient  Chief Complaint   Patient presents with   • Leg Pain   • Leg Swelling   • Abdominal Pain       History is limited by: N/A    History of Present Illness:  Patient is a 32 y.o. year old male who presents to the emergency department for evaluation of leg swelling and abdominal pain.  Patient has been to the emergency department earlier  today and eloped however he came back due to family recommendation.  (Provider in triage, Lane Asher PA-C)    Patient Care Team  Primary Care Provider: Provider, No Known    Past Medical History:     Allergies   Allergen Reactions   • Elemental Sulfur Swelling   • Naloxone Swelling   • Tylenol [Acetaminophen] Swelling     Past Medical History:   Diagnosis Date   • Crohn's disease (HCC)      Past Surgical History:   Procedure Laterality Date   • COLON SURGERY     • SIGMOIDOSCOPY N/A 2/14/2023    Procedure: SIGMOIDOSCOPY FLEXIBLE;  Surgeon: Valentin Mason MD;  Location: [facility] ENDOSCOPY;  Service: Gastroenterology;  Laterality: N/A;  POOR PREP   • STOMACH SURGERY       Family History   Problem Relation Age of Onset   • Colon cancer Neg Hx        Home Medications:  Prior to Admission medications    Medication Sig Start Date End Date Taking? Authorizing Provider   buprenorphine-naloxone (SUBOXONE) 8-2 MG per SL tablet Place 1 tablet under the tongue Daily.    ProvideraVn MD   ciprofloxacin (CIPRO) 500 MG tablet Take 1 tablet by mouth Every 12 (Twelve) Hours. 1/20/23   Janel Ernandez APRN   cloNIDine (CATAPRES) 0.2 MG tablet Take 0.2 mg by mouth 2 (Two) Times a Day.    Van Segura MD   famotidine (PEPCID) 40 MG tablet Take 40 mg by mouth Daily.    Van Segura MD   gabapentin (NEURONTIN) 600 MG tablet Take 600 mg by mouth 3 (Three) Times a Day.    Van Segura MD   hydrOXYzine pamoate (VISTARIL) 50 MG capsule  Take 50 mg by mouth 3 (Three) Times a Day As Needed for Itching.    Van Segura MD   melatonin 1 MG tablet Take 3 mg by mouth.    Van Segura MD   metroNIDAZOLE (FLAGYL) 500 MG tablet Take 1 tablet by mouth 2 (Two) Times a Day. 1/20/23   Janel Ernandez APRN   OLANZapine (zyPREXA) 15 MG tablet Take 15 mg by mouth Every Night.    Van Segura MD   omeprazole (priLOSEC) 40 MG capsule Take 40 mg by mouth Daily.    Van Segura MD   ondansetron ODT (ZOFRAN-ODT) 4 MG disintegrating tablet Place 1 tablet on the tongue Every 8 (Eight) Hours As Needed for Nausea or Vomiting. 1/20/23   Janel Ernandez APRN   polyethylene glycol (GoLYTELY) 236 g solution Starting at noon on day prior to procedure, drink 8 ounces every 30 minutes until all gone or stools are clear. May add flavor packet. 2/14/23   Valentin Mason MD   QUEtiapine (SEROquel) 300 MG tablet Take 300 mg by mouth Every Night.    Van Segura MD   traZODone (DESYREL) 100 MG tablet Take 300 mg by mouth Every Night.    Van Segura MD   venlafaxine (EFFEXOR) 75 MG tablet Take 75 mg by mouth Daily.    Van Segura MD        Social History:   Social History     Tobacco Use   • Smoking status: Every Day     Packs/day: 1.00     Years: 20.00     Pack years: 20.00     Types: Cigarettes   Vaping Use   • Vaping Use: Never used   Substance Use Topics   • Alcohol use: Not Currently   • Drug use: Not Currently         Review of Systems:  Review of Systems   Constitutional: Negative for chills and fever.   HENT: Negative for congestion, ear pain and sore throat.    Eyes: Negative for pain.   Respiratory: Negative for cough, chest tightness and shortness of breath.    Cardiovascular: Positive for leg swelling. Negative for chest pain.   Gastrointestinal: Positive for abdominal pain. Negative for diarrhea, nausea and vomiting.   Genitourinary: Negative for flank pain and hematuria.   Musculoskeletal:  "Negative for joint swelling.   Skin: Negative for pallor.   Neurological: Negative for seizures and headaches.   All other systems reviewed and are negative.       Physical Exam:  /63 (BP Location: Right arm, Patient Position: Sitting)   Pulse 83   Temp 97.3 °F (36.3 °C) (Oral)   Resp 20   Ht 175.3 cm (69\")   Wt 81.6 kg (179 lb 14.3 oz)   SpO2 100%   BMI 26.57 kg/m²     Physical Exam  Vitals and nursing note reviewed.   Constitutional:       General: He is not in acute distress.     Appearance: Normal appearance. He is not toxic-appearing.   HENT:      Head: Normocephalic and atraumatic.      Jaw: There is normal jaw occlusion.   Eyes:      General: Lids are normal.      Extraocular Movements: Extraocular movements intact.      Conjunctiva/sclera: Conjunctivae normal.      Pupils: Pupils are equal, round, and reactive to light.   Cardiovascular:      Rate and Rhythm: Normal rate and regular rhythm.      Pulses: Normal pulses.      Heart sounds: Normal heart sounds.   Pulmonary:      Effort: Pulmonary effort is normal. No respiratory distress.      Breath sounds: Normal breath sounds. No wheezing or rhonchi.   Abdominal:      General: Abdomen is flat. There is no distension.      Palpations: Abdomen is soft.      Tenderness: There is no abdominal tenderness. There is no guarding or rebound.   Musculoskeletal:         General: Normal range of motion.      Cervical back: Normal range of motion and neck supple.      Right lower le+ Edema present.      Left lower le+ Edema present.   Skin:     General: Skin is warm and dry.      Coloration: Skin is not cyanotic.   Neurological:      Mental Status: He is alert and oriented to person, place, and time. Mental status is at baseline.   Psychiatric:         Attention and Perception: Attention and perception normal.         Mood and Affect: Mood normal.                  Procedures:  Procedures      Medical Decision Making:      Comorbidities that affect " care:    crohn's disease    External Notes reviewed:    Previous Clinic Note: Gastroenterology for general Crohn's management      The following orders were placed and all results were independently analyzed by me:  No orders of the defined types were placed in this encounter.      Medications Given in the Emergency Department:  Medications   furosemide (LASIX) tablet 20 mg (has no administration in time range)        ED Course:    The patient was initially evaluated in the triage area where orders were placed. The patient was later dispositioned by Maverick Quintero MD.      The patient was advised to stay for completion of workup which includes but is not limited to communication of labs and radiological results, reassessment and plan. The patient was advised that leaving prior to disposition by a provider could result in critical findings that are not communicated to the patient.     ED Course as of 03/08/23 2004   Wed Mar 08, 2023   1737 PROVIDER IN TRIAGE  Patient was evaluated by me in triage, Lane Asher PA-C.  Orders were placed and patient is currently awaiting final results and disposition.  [MD]      ED Course User Index  [MD] Lane Asher PA-C       Labs:    Lab Results (last 24 hours)     Procedure Component Value Units Date/Time    CBC & Differential [[id number]]  (Abnormal) Collected: 03/08/23 1544    Specimen: Blood Updated: 03/08/23 1602    Narrative:      The following orders were created for panel order CBC & Differential.  Procedure                               Abnormality         Status                     ---------                               -----------         ------                     CBC Auto Differential[[id number]]        Abnormal            Final result                 Please view results for these tests on the individual orders.    Comprehensive Metabolic Panel [[id number]]  (Abnormal) Collected: 03/08/23 1544    Specimen: Blood Updated: 03/08/23 1623     Glucose 86 mg/dL       BUN 13 mg/dL      Creatinine 0.83 mg/dL      Sodium 142 mmol/L      Potassium 3.8 mmol/L      Chloride 111 mmol/L      CO2 26.6 mmol/L      Calcium 7.3 mg/dL      Total Protein 4.2 g/dL      Albumin 1.9 g/dL      ALT (SGPT) 15 U/L      AST (SGOT) 13 U/L      Alkaline Phosphatase 114 U/L      Total Bilirubin 0.2 mg/dL      Globulin 2.3 gm/dL      A/G Ratio 0.8 g/dL      BUN/Creatinine Ratio 15.7     Anion Gap 4.4 mmol/L      eGFR 117.8 mL/min/1.73     Narrative:      GFR Normal >60  Chronic Kidney Disease <60  Kidney Failure <15      Lipase [[id number]]  (Abnormal) Collected: 03/08/23 1544    Specimen: Blood Updated: 03/08/23 1623     Lipase 6 U/L     Single High Sensitivity Troponin T [[id number]]  (Normal) Collected: 03/08/23 1544    Specimen: Blood Updated: 03/08/23 1623     HS Troponin T <6 ng/L     Narrative:      High Sensitive Troponin T Reference Range:  <10.0 ng/L- Negative Female for AMI  <15.0 ng/L- Negative Male for AMI  >=10 - Abnormal Female indicating possible myocardial injury.  >=15 - Abnormal Male indicating possible myocardial injury.   Clinicians would have to utilize clinical acumen, EKG, Troponin, and serial changes to determine if it is an Acute Myocardial Infarction or myocardial injury due to an underlying chronic condition.         CBC Auto Differential [[id number]]  (Abnormal) Collected: 03/08/23 1544    Specimen: Blood Updated: 03/08/23 1602     WBC 7.81 10*3/mm3      RBC 3.39 10*6/mm3      Hemoglobin 10.5 g/dL      Hematocrit 31.6 %      MCV 93.2 fL      MCH 31.0 pg      MCHC 33.2 g/dL      RDW 13.6 %      RDW-SD 46.0 fl      MPV 9.6 fL      Platelets 295 10*3/mm3      Neutrophil % 73.4 %      Lymphocyte % 16.6 %      Monocyte % 9.1 %      Eosinophil % 0.4 %      Basophil % 0.1 %      Immature Grans % 0.4 %      Neutrophils, Absolute 5.73 10*3/mm3      Lymphocytes, Absolute 1.30 10*3/mm3      Monocytes, Absolute 0.71 10*3/mm3      Eosinophils, Absolute 0.03 10*3/mm3      Basophils, Absolute  0.01 10*3/mm3      Immature Grans, Absolute 0.03 10*3/mm3      nRBC 0.0 /100 WBC     BNP [[id number]]  (Normal) Collected: 03/08/23 1544    Specimen: Blood Updated: 03/08/23 1618     proBNP 168.0 pg/mL     Narrative:      Among patients with dyspnea, NT-proBNP is highly sensitive for the detection of acute congestive heart failure. In addition NT-proBNP of <300 pg/ml effectively rules out acute congestive heart failure with 99% negative predictive value.             Imaging:    XR Chest 1 View    Result Date: 3/8/2023  PROCEDURE: XR CHEST 1 VW  COMPARISON: None  INDICATIONS: SOA  FINDINGS:  The heart is normal in size.  The lungs are well-expanded.  Subsegmental atelectasis is seen at the left lung base.  Bony structures appear intact.        Subsegmental atelectasis is seen at the left lung base.       CHELSIE ALEJANDRO MD       Electronically Signed and Approved By: CHELSIE ALEJANDRO MD on 3/08/2023 at 16:13                 Differential Diagnosis and Discussion:      Abdominal Pain: Based on the patient's signs and symptoms, I considered abdominal aortic aneurysm, small bowel obstruction, pancreatitis, acute cholecystitis, acute appendecitis, peptic ulcer disease, gastritis, colitis, endocrine disorders, irritable bowel syndrome and other differential diagnosis an etiology of the patient's abdominal pain.    All labs were reviewed and interpreted by me.    MDM  Number of Diagnoses or Management Options  Generalized abdominal pain  Peripheral edema  Diagnosis management comments: In summary this is a 32-year-old male who presents to the emergency department for the second time today for evaluation of abdominal pain leg swelling.  Patient does state he has a history of Crohn's disease.  He also states he is on his feet all the time while working at [employer].  CBC independently reviewed by me and shows no critical abnormalities.  CMP independently reviewed by me and shows no critical abnormalities.  Patient is otherwise  well-appearing in no acute distress.  I had lengthy discussion with him regarding the use of compression socks and will also give him a short-term treatment of Lasix for the peripheral edema.  Also given prescription for Bentyl and referred him to his gastroenterologist.  Very strict return to ER and follow-up instructions have been provided to the patient.             Patient Care Considerations:    CT ABDOMEN AND PELVIS: I considered ordering a CT scan of the abdomen and pelvis however Abdominal exam is benign      Consultants/Shared Management Plan:    None    Social Determinants of Health:    Patient is independent, reliable, and has access to care.       Disposition and Care Coordination:    Discharged: The patient is suitable and stable for discharge with no need for consideration of observation or admission.    I have explained the patient´s condition, diagnoses and treatment plan based on the information available to me at this time. I have answered questions and addressed any concerns. The patient has a good  understanding of the patient´s diagnosis, condition, and treatment plan as can be expected at this point. The vital signs have been stable. The patient´s condition is stable and appropriate for discharge from the emergency department.      The patient will pursue further outpatient evaluation with the primary care physician or other designated or consulting physician as outlined in the discharge instructions. They are agreeable to this plan of care and follow-up instructions have been explained in detail. The patient has received these instructions in written format and have expressed an understanding of the discharge instructions. The patient is aware that any significant change in condition or worsening of symptoms should prompt an immediate return to this or the closest emergency department or call to 911.  I have explained discharge medications and the need for follow up with the  patient/caretakers. This was also printed in the discharge instructions. Patient was discharged with the following medications and follow up:      Medication List      New Prescriptions    dicyclomine 20 MG tablet  Commonly known as: BENTYL  Take 1 tablet by mouth Every 6 (Six) Hours As Needed (abdominal pain).     furosemide 20 MG tablet  Commonly known as: LASIX  Take 1 tablet by mouth Daily.           Where to Get Your Medications      These medications were sent [place] [facility] [place] [address] - [phone] [facility] [phone] FX  [address], [place] [address]    Phone: [phone]   · dicyclomine 20 MG tablet  · furosemide 20 MG tablet      No follow-up provider specified.     Final diagnoses:   Peripheral edema   Generalized abdominal pain        ED Disposition     ED Disposition   Discharge    Condition   Stable    Comment   --             This medical record created using voice recognition software.           Akhil Perry  03/08/23 1920       Maverick Quintero MD  03/08/23 2004

## 2023-03-08 NOTE — ED PROVIDER NOTES
Time: 3:33 PM EST  Date of encounter:  3/8/2023  Independent Historian/Clinical History and Information was obtained by:   Patient  Chief Complaint   Patient presents with   • Abdominal Pain   • Leg Swelling       History is limited by: N/A    History of Present Illness:  Patient is a 32 y.o. year old male who presents to the emergency department for evaluation of abdominal pain and bilateral leg swelling.  Patient states his legs been swollen for approximately 1 week.  Patient denies any history of heart failure or kidney disease.  Patient does state he is also having upper and lower abdominal pain that has been present for the last 3 to 4 days.  Patient has history of Crohn's.  (Provider in triage, Lane Asher PA-C)    HPI    Patient Care Team  Primary Care Provider: Provider, Jaimie Known    Past Medical History:     Allergies   Allergen Reactions   • Elemental Sulfur Swelling   • Naloxone Swelling   • Tylenol [Acetaminophen] Swelling     Past Medical History:   Diagnosis Date   • Crohn's disease (HCC)      Past Surgical History:   Procedure Laterality Date   • COLON SURGERY     • SIGMOIDOSCOPY N/A 2/14/2023    Procedure: SIGMOIDOSCOPY FLEXIBLE;  Surgeon: Valentin Mason MD;  Location: [facility] ENDOSCOPY;  Service: Gastroenterology;  Laterality: N/A;  POOR PREP   • STOMACH SURGERY       Family History   Problem Relation Age of Onset   • Colon cancer Neg Hx        Home Medications:  Prior to Admission medications    Medication Sig Start Date End Date Taking? Authorizing Provider   buprenorphine-naloxone (SUBOXONE) 8-2 MG per SL tablet Place 1 tablet under the tongue Daily.    ProviderVan MD   ciprofloxacin (CIPRO) 500 MG tablet Take 1 tablet by mouth Every 12 (Twelve) Hours. 1/20/23   Janel rEnandez APRN   cloNIDine (CATAPRES) 0.2 MG tablet Take 0.2 mg by mouth 2 (Two) Times a Day.    ProviderVan MD   famotidine (PEPCID) 40 MG tablet Take 40 mg by mouth Daily.    Van Segura  MD   gabapentin (NEURONTIN) 600 MG tablet Take 600 mg by mouth 3 (Three) Times a Day.    Van Segura MD   hydrOXYzine pamoate (VISTARIL) 50 MG capsule Take 50 mg by mouth 3 (Three) Times a Day As Needed for Itching.    Van Segura MD   melatonin 1 MG tablet Take 3 mg by mouth.    Van Segura MD   metroNIDAZOLE (FLAGYL) 500 MG tablet Take 1 tablet by mouth 2 (Two) Times a Day. 1/20/23   Janel Ernandez APRN   OLANZapine (zyPREXA) 15 MG tablet Take 15 mg by mouth Every Night.    Van Segura MD   omeprazole (priLOSEC) 40 MG capsule Take 40 mg by mouth Daily.    Van Segura MD   ondansetron ODT (ZOFRAN-ODT) 4 MG disintegrating tablet Place 1 tablet on the tongue Every 8 (Eight) Hours As Needed for Nausea or Vomiting. 1/20/23   Janel Ernandez APRN   polyethylene glycol (GoLYTELY) 236 g solution Starting at noon on day prior to procedure, drink 8 ounces every 30 minutes until all gone or stools are clear. May add flavor packet. 2/14/23   Valentin Mason MD   QUEtiapine (SEROquel) 300 MG tablet Take 300 mg by mouth Every Night.    Van Segura MD   traZODone (DESYREL) 100 MG tablet Take 300 mg by mouth Every Night.    Van Segura MD   venlafaxine (EFFEXOR) 75 MG tablet Take 75 mg by mouth Daily.    Van Segura MD        Social History:   Social History     Tobacco Use   • Smoking status: Every Day     Packs/day: 1.00     Years: 20.00     Pack years: 20.00     Types: Cigarettes   Vaping Use   • Vaping Use: Never used   Substance Use Topics   • Alcohol use: Not Currently   • Drug use: Not Currently         Review of Systems:  Review of Systems   Constitutional: Negative for chills and fever.   HENT: Negative for congestion, ear pain and sore throat.    Eyes: Negative for pain.   Respiratory: Positive for shortness of breath. Negative for cough and chest tightness.    Cardiovascular: Positive for leg swelling. Negative for chest pain.  "  Gastrointestinal: Positive for abdominal pain. Negative for diarrhea, nausea and vomiting.   Genitourinary: Negative for flank pain and hematuria.   Musculoskeletal: Negative for joint swelling.   Skin: Negative for pallor.   Neurological: Negative for seizures and headaches.   All other systems reviewed and are negative.       Physical Exam:  /68 (BP Location: Right arm, Patient Position: Sitting)   Pulse 85   Temp 98 °F (36.7 °C) (Oral)   Resp 18   Ht 175.3 cm (69\")   Wt 81.6 kg (180 lb)   SpO2 96%   BMI 26.58 kg/m²     Physical Exam  Constitutional:       Appearance: Normal appearance.   HENT:      Head: Normocephalic.   Eyes:      Extraocular Movements: Extraocular movements intact.      Conjunctiva/sclera: Conjunctivae normal.   Cardiovascular:      Rate and Rhythm: Normal rate and regular rhythm.   Pulmonary:      Effort: Pulmonary effort is normal.   Abdominal:      General: There is no distension.      Palpations: Abdomen is soft.      Tenderness: There is no abdominal tenderness.   Skin:     General: Skin is warm.      Coloration: Skin is not cyanotic.   Neurological:      Mental Status: He is alert and oriented to person, place, and time.   Psychiatric:         Attention and Perception: Attention and perception normal.         Mood and Affect: Mood normal.                  Procedures:  Procedures      Medical Decision Making:       Comorbidities that affect care:    Crohn's disease    External Notes reviewed:    Previous Clinic Note: GI clinic      The following orders were placed and all results were independently analyzed by me:  Orders Placed This Encounter   Procedures   • XR Chest 1 View   • [place] Draw   • Comprehensive Metabolic Panel   • Lipase   • Single High Sensitivity Troponin T   • Urinalysis With Microscopic If Indicated (No Culture) - Urine, Clean Catch   • CBC Auto Differential   • BNP   • NPO Diet NPO Type: Strict NPO   • Undress & Gown   • Cardiac Monitoring   • Continuous " Pulse Oximetry   • Vital Signs   • Oxygen Therapy- Nasal Cannula; 2 LPM; Titrate for SPO2: 92%, Greater Than or Equal To   • ECG 12 Lead ED Triage Standing Order; Abdominal Pain (Upper)   • Insert Peripheral IV   • CBC & Differential   • Green Top (Gel)   • Lavender Top   • Gold Top - SST   • Light Blue Top       Medications Given in the Emergency Department:  Medications - No data to display     ED Course:    The patient was initially evaluated in the triage area where orders were placed. The patient was later dispositioned by Feliz Luz DO.      The patient was advised to stay for completion of workup which includes but is not limited to communication of labs and radiological results, reassessment and plan. The patient was advised that leaving prior to disposition by a provider could result in critical findings that are not communicated to the patient.     ED Course as of 03/08/23 1734   Wed Mar 08, 2023   1534 PROVIDER IN TRIAGE  Patient was evaluated by me in triage, Lane Asher PA-C.  Orders were placed and patient is currently awaiting final results and disposition.  [MD]      ED Course User Index  [MD] Lane Asher PA-C       Labs:    Lab Results (last 24 hours)     Procedure Component Value Units Date/Time    CBC & Differential [[id number]]  (Abnormal) Collected: 03/08/23 1544    Specimen: Blood Updated: 03/08/23 1602    Narrative:      The following orders were created for panel order CBC & Differential.  Procedure                               Abnormality         Status                     ---------                               -----------         ------                     CBC Auto Differential[[id number]]        Abnormal            Final result                 Please view results for these tests on the individual orders.    Comprehensive Metabolic Panel [[id number]]  (Abnormal) Collected: 03/08/23 1544    Specimen: Blood Updated: 03/08/23 1623     Glucose 86 mg/dL      BUN 13 mg/dL       Creatinine 0.83 mg/dL      Sodium 142 mmol/L      Potassium 3.8 mmol/L      Chloride 111 mmol/L      CO2 26.6 mmol/L      Calcium 7.3 mg/dL      Total Protein 4.2 g/dL      Albumin 1.9 g/dL      ALT (SGPT) 15 U/L      AST (SGOT) 13 U/L      Alkaline Phosphatase 114 U/L      Total Bilirubin 0.2 mg/dL      Globulin 2.3 gm/dL      A/G Ratio 0.8 g/dL      BUN/Creatinine Ratio 15.7     Anion Gap 4.4 mmol/L      eGFR 117.8 mL/min/1.73     Narrative:      GFR Normal >60  Chronic Kidney Disease <60  Kidney Failure <15      Lipase [[id number]]  (Abnormal) Collected: 03/08/23 1544    Specimen: Blood Updated: 03/08/23 1623     Lipase 6 U/L     Single High Sensitivity Troponin T [[id number]]  (Normal) Collected: 03/08/23 1544    Specimen: Blood Updated: 03/08/23 1623     HS Troponin T <6 ng/L     Narrative:      High Sensitive Troponin T Reference Range:  <10.0 ng/L- Negative Female for AMI  <15.0 ng/L- Negative Male for AMI  >=10 - Abnormal Female indicating possible myocardial injury.  >=15 - Abnormal Male indicating possible myocardial injury.   Clinicians would have to utilize clinical acumen, EKG, Troponin, and serial changes to determine if it is an Acute Myocardial Infarction or myocardial injury due to an underlying chronic condition.         CBC Auto Differential [[id number]]  (Abnormal) Collected: 03/08/23 1544    Specimen: Blood Updated: 03/08/23 1602     WBC 7.81 10*3/mm3      RBC 3.39 10*6/mm3      Hemoglobin 10.5 g/dL      Hematocrit 31.6 %      MCV 93.2 fL      MCH 31.0 pg      MCHC 33.2 g/dL      RDW 13.6 %      RDW-SD 46.0 fl      MPV 9.6 fL      Platelets 295 10*3/mm3      Neutrophil % 73.4 %      Lymphocyte % 16.6 %      Monocyte % 9.1 %      Eosinophil % 0.4 %      Basophil % 0.1 %      Immature Grans % 0.4 %      Neutrophils, Absolute 5.73 10*3/mm3      Lymphocytes, Absolute 1.30 10*3/mm3      Monocytes, Absolute 0.71 10*3/mm3      Eosinophils, Absolute 0.03 10*3/mm3      Basophils, Absolute 0.01 10*3/mm3       Immature Grans, Absolute 0.03 10*3/mm3      nRBC 0.0 /100 WBC     BNP [[id number]]  (Normal) Collected: 03/08/23 1544    Specimen: Blood Updated: 03/08/23 1618     proBNP 168.0 pg/mL     Narrative:      Among patients with dyspnea, NT-proBNP is highly sensitive for the detection of acute congestive heart failure. In addition NT-proBNP of <300 pg/ml effectively rules out acute congestive heart failure with 99% negative predictive value.           EKG: Sinus rhythm with a rate of 79 bpm  Normal P wave and  Normal QRS normal axis  Normal ST segments and normal QT/QTc interval        Imaging:    XR Chest 1 View    Result Date: 3/8/2023  PROCEDURE: XR CHEST 1 VW  COMPARISON: None  INDICATIONS: SOA  FINDINGS:  The heart is normal in size.  The lungs are well-expanded.  Subsegmental atelectasis is seen at the left lung base.  Bony structures appear intact.        Subsegmental atelectasis is seen at the left lung base.       CHELSIE ALEJANDRO MD       Electronically Signed and Approved By: CHELSIE ALEJANDRO MD on 3/08/2023 at 16:13                 Differential Diagnosis and Discussion:      Abdominal Pain: Based on the patient's signs and symptoms, I considered abdominal aortic aneurysm, small bowel obstruction, pancreatitis, acute cholecystitis, acute appendecitis, peptic ulcer disease, gastritis, colitis, endocrine disorders, irritable bowel syndrome and other differential diagnosis an etiology of the patient's abdominal pain.    All labs were reviewed and interpreted by me.    MDM  Number of Diagnoses or Management Options  Diarrhea, unspecified type  Edema, unspecified type  Diagnosis management comments: This patient eloped prior to discussion of his studies and stated that he was hungry.       Amount and/or Complexity of Data Reviewed  Clinical lab tests: reviewed  Tests in the radiology section of CPT®: reviewed  Tests in the medicine section of CPT®: reviewed  Decide to obtain previous medical records or to obtain history  from someone other than the patient: yes  Obtain history from someone other than the patient: yes  Review and summarize past medical records: yes  Discuss the patient with other providers: yes  Independent visualization of images, tracings, or specimens: yes    Patient Progress  Patient progress: improved           Patient Care Considerations:    All appropriate tests were ordered      Consultants/Shared Management Plan:    None    Social Determinants of Health:    Patient is independent, reliable, and has access to care.       Disposition and Care Coordination:    Eloped: This patient has left the emergency department or waiting room with no communication to myself, nursing or administrative staff. There was no opportunity to discuss the patient's decision to leave, provide medical advice or discuss alternatives to. The staff has made efforts to locate patient without success.        Final diagnoses:   Edema, unspecified type   Diarrhea, unspecified type        ED Disposition     ED Disposition   Eloped    Condition   --    Comment   --             This medical record created using voice recognition software.           Feliz Luz DO  03/08/23 1738

## 2023-03-08 NOTE — ED NOTES
Pt stated that he has been in his room for an hour and not seen a DR, he is hungry and thirsty and he would prefer to leave. Pt pulled IV out and left AMA

## 2023-03-14 ENCOUNTER — PREP FOR SURGERY (OUTPATIENT)
Dept: OTHER | Facility: HOSPITAL | Age: 33
End: 2023-03-14
Payer: MEDICARE

## 2023-03-14 DIAGNOSIS — R10.84 GENERALIZED ABDOMINAL PAIN: ICD-10-CM

## 2023-03-14 DIAGNOSIS — R19.7 DIARRHEA, UNSPECIFIED TYPE: Primary | ICD-10-CM

## 2023-03-14 DIAGNOSIS — K50.019 CROHN'S DISEASE OF SMALL INTESTINE WITH COMPLICATION: ICD-10-CM

## 2023-03-15 ENCOUNTER — APPOINTMENT (OUTPATIENT)
Dept: CT IMAGING | Facility: HOSPITAL | Age: 33
DRG: 385 | End: 2023-03-15
Payer: MEDICARE

## 2023-03-15 ENCOUNTER — HOSPITAL ENCOUNTER (INPATIENT)
Facility: HOSPITAL | Age: 33
LOS: 3 days | Discharge: HOME | DRG: 385 | End: 2023-03-18
Attending: EMERGENCY MEDICINE | Admitting: FAMILY MEDICINE
Payer: MEDICARE

## 2023-03-15 ENCOUNTER — OFFICE VISIT (OUTPATIENT)
Dept: GASTROENTEROLOGY | Facility: CLINIC | Age: 33
End: 2023-03-15
Payer: MEDICARE

## 2023-03-15 VITALS
HEIGHT: 69 IN | HEART RATE: 96 BPM | DIASTOLIC BLOOD PRESSURE: 57 MMHG | SYSTOLIC BLOOD PRESSURE: 97 MMHG | BODY MASS INDEX: 27.22 KG/M2 | WEIGHT: 183.8 LBS

## 2023-03-15 DIAGNOSIS — R10.9 ABDOMINAL PAIN, UNSPECIFIED ABDOMINAL LOCATION: Primary | ICD-10-CM

## 2023-03-15 DIAGNOSIS — R77.0 LOW SERUM ALBUMIN: ICD-10-CM

## 2023-03-15 DIAGNOSIS — D64.9 ANEMIA, UNSPECIFIED TYPE: ICD-10-CM

## 2023-03-15 DIAGNOSIS — I95.9 HYPOTENSION, UNSPECIFIED HYPOTENSION TYPE: ICD-10-CM

## 2023-03-15 DIAGNOSIS — R10.13 EPIGASTRIC PAIN: Primary | ICD-10-CM

## 2023-03-15 DIAGNOSIS — R19.7 DIARRHEA, UNSPECIFIED TYPE: ICD-10-CM

## 2023-03-15 DIAGNOSIS — R15.9 INCONTINENCE OF FECES, UNSPECIFIED FECAL INCONTINENCE TYPE: ICD-10-CM

## 2023-03-15 LAB
027 TOXIN: NORMAL
ALBUMIN SERPL-MCNC: 1.7 G/DL (ref 3.5–5.2)
ALBUMIN/GLOB SERPL: 0.6 G/DL
ALP SERPL-CCNC: 117 U/L (ref 39–117)
ALT SERPL W P-5'-P-CCNC: 11 U/L (ref 1–41)
ANION GAP SERPL CALCULATED.3IONS-SCNC: 5.2 MMOL/L (ref 5–15)
AST SERPL-CCNC: 12 U/L (ref 1–40)
BASOPHILS # BLD AUTO: 0.02 10*3/MM3 (ref 0–0.2)
BASOPHILS NFR BLD AUTO: 0.3 % (ref 0–1.5)
BILIRUB SERPL-MCNC: 0.2 MG/DL (ref 0–1.2)
BUN SERPL-MCNC: 16 MG/DL (ref 6–20)
BUN/CREAT SERPL: 17.8 (ref 7–25)
C DIFF TOX GENS STL QL NAA+PROBE: NEGATIVE
CALCIUM SPEC-SCNC: 7.4 MG/DL (ref 8.6–10.5)
CHLORIDE SERPL-SCNC: 107 MMOL/L (ref 98–107)
CO2 SERPL-SCNC: 27.8 MMOL/L (ref 22–29)
CREAT SERPL-MCNC: 0.9 MG/DL (ref 0.76–1.27)
D-LACTATE SERPL-SCNC: 1.5 MMOL/L (ref 0.5–2)
DEPRECATED RDW RBC AUTO: 47.1 FL (ref 37–54)
EGFRCR SERPLBLD CKD-EPI 2021: 114.9 ML/MIN/1.73
EOSINOPHIL # BLD AUTO: 0.07 10*3/MM3 (ref 0–0.4)
EOSINOPHIL NFR BLD AUTO: 1.2 % (ref 0.3–6.2)
ERYTHROCYTE [DISTWIDTH] IN BLOOD BY AUTOMATED COUNT: 13.9 % (ref 12.3–15.4)
GLOBULIN UR ELPH-MCNC: 2.9 GM/DL
GLUCOSE SERPL-MCNC: 84 MG/DL (ref 65–99)
HCT VFR BLD AUTO: 31.2 % (ref 37.5–51)
HEMOCCULT STL QL IA: NEGATIVE
HGB BLD-MCNC: 10.3 G/DL (ref 13–17.7)
HOLD SPECIMEN: NORMAL
HOLD SPECIMEN: NORMAL
IMM GRANULOCYTES # BLD AUTO: 0.02 10*3/MM3 (ref 0–0.05)
IMM GRANULOCYTES NFR BLD AUTO: 0.3 % (ref 0–0.5)
INR PPP: 1.18 (ref 0.86–1.15)
LACTOFERRIN STL QL LA: POSITIVE
LIPASE SERPL-CCNC: 13 U/L (ref 13–60)
LYMPHOCYTES # BLD AUTO: 1.19 10*3/MM3 (ref 0.7–3.1)
LYMPHOCYTES NFR BLD AUTO: 19.8 % (ref 19.6–45.3)
MCH RBC QN AUTO: 30.9 PG (ref 26.6–33)
MCHC RBC AUTO-ENTMCNC: 33 G/DL (ref 31.5–35.7)
MCV RBC AUTO: 93.7 FL (ref 79–97)
MONOCYTES # BLD AUTO: 0.65 10*3/MM3 (ref 0.1–0.9)
MONOCYTES NFR BLD AUTO: 10.8 % (ref 5–12)
NEUTROPHILS NFR BLD AUTO: 4.05 10*3/MM3 (ref 1.7–7)
NEUTROPHILS NFR BLD AUTO: 67.6 % (ref 42.7–76)
NRBC BLD AUTO-RTO: 0 /100 WBC (ref 0–0.2)
PHOSPHATE SERPL-MCNC: 3.5 MG/DL (ref 2.5–4.5)
PLATELET # BLD AUTO: 350 10*3/MM3 (ref 140–450)
PMV BLD AUTO: 9.3 FL (ref 6–12)
POTASSIUM SERPL-SCNC: 3.9 MMOL/L (ref 3.5–5.2)
PROT SERPL-MCNC: 4.6 G/DL (ref 6–8.5)
PROTHROMBIN TIME: 15 SECONDS (ref 11.8–14.9)
QT INTERVAL: 368 MS
RBC # BLD AUTO: 3.33 10*6/MM3 (ref 4.14–5.8)
SODIUM SERPL-SCNC: 140 MMOL/L (ref 136–145)
WBC NRBC COR # BLD: 6 10*3/MM3 (ref 3.4–10.8)
WHOLE BLOOD HOLD COAG: NORMAL
WHOLE BLOOD HOLD SPECIMEN: NORMAL

## 2023-03-15 PROCEDURE — 25010000002 HYDROMORPHONE 1 MG/ML SOLUTION: Performed by: EMERGENCY MEDICINE

## 2023-03-15 PROCEDURE — 84100 ASSAY OF PHOSPHORUS: CPT | Performed by: FAMILY MEDICINE

## 2023-03-15 PROCEDURE — 74177 CT ABD & PELVIS W/CONTRAST: CPT

## 2023-03-15 PROCEDURE — 87506 IADNA-DNA/RNA PROBE TQ 6-11: CPT | Performed by: FAMILY MEDICINE

## 2023-03-15 PROCEDURE — 83690 ASSAY OF LIPASE: CPT | Performed by: NURSE PRACTITIONER

## 2023-03-15 PROCEDURE — 25010000002 ONDANSETRON PER 1 MG: Performed by: FAMILY MEDICINE

## 2023-03-15 PROCEDURE — 83630 LACTOFERRIN FECAL (QUAL): CPT | Performed by: NURSE PRACTITIONER

## 2023-03-15 PROCEDURE — 87493 C DIFF AMPLIFIED PROBE: CPT | Performed by: NURSE PRACTITIONER

## 2023-03-15 PROCEDURE — 83605 ASSAY OF LACTIC ACID: CPT | Performed by: NURSE PRACTITIONER

## 2023-03-15 PROCEDURE — 99215 OFFICE O/P EST HI 40 MIN: CPT | Performed by: NURSE PRACTITIONER

## 2023-03-15 PROCEDURE — 25010000002 ONDANSETRON PER 1 MG: Performed by: EMERGENCY MEDICINE

## 2023-03-15 PROCEDURE — 25010000002 KETOROLAC TROMETHAMINE PER 15 MG: Performed by: FAMILY MEDICINE

## 2023-03-15 PROCEDURE — 82274 ASSAY TEST FOR BLOOD FECAL: CPT | Performed by: FAMILY MEDICINE

## 2023-03-15 PROCEDURE — 85025 COMPLETE CBC W/AUTO DIFF WBC: CPT | Performed by: NURSE PRACTITIONER

## 2023-03-15 PROCEDURE — 99284 EMERGENCY DEPT VISIT MOD MDM: CPT

## 2023-03-15 PROCEDURE — 99223 1ST HOSP IP/OBS HIGH 75: CPT | Performed by: FAMILY MEDICINE

## 2023-03-15 PROCEDURE — 25510000001 IOPAMIDOL PER 1 ML: Performed by: EMERGENCY MEDICINE

## 2023-03-15 PROCEDURE — 80053 COMPREHEN METABOLIC PANEL: CPT | Performed by: NURSE PRACTITIONER

## 2023-03-15 PROCEDURE — 85610 PROTHROMBIN TIME: CPT | Performed by: FAMILY MEDICINE

## 2023-03-15 PROCEDURE — 36415 COLL VENOUS BLD VENIPUNCTURE: CPT

## 2023-03-15 RX ORDER — BUPRENORPHINE HYDROCHLORIDE 8 MG/1
22 TABLET SUBLINGUAL DAILY
COMMUNITY

## 2023-03-15 RX ORDER — SODIUM CHLORIDE 0.9 % (FLUSH) 0.9 %
10 SYRINGE (ML) INJECTION EVERY 12 HOURS SCHEDULED
Status: DISCONTINUED | OUTPATIENT
Start: 2023-03-15 | End: 2023-03-18 | Stop reason: HOSPADM

## 2023-03-15 RX ORDER — GABAPENTIN 300 MG/1
600 CAPSULE ORAL EVERY 8 HOURS SCHEDULED
Status: DISCONTINUED | OUTPATIENT
Start: 2023-03-15 | End: 2023-03-18 | Stop reason: HOSPADM

## 2023-03-15 RX ORDER — CHOLECALCIFEROL (VITAMIN D3) 125 MCG
5 CAPSULE ORAL NIGHTLY
Status: DISCONTINUED | OUTPATIENT
Start: 2023-03-15 | End: 2023-03-18 | Stop reason: HOSPADM

## 2023-03-15 RX ORDER — SODIUM CHLORIDE 0.9 % (FLUSH) 0.9 %
10 SYRINGE (ML) INJECTION AS NEEDED
Status: DISCONTINUED | OUTPATIENT
Start: 2023-03-15 | End: 2023-03-18 | Stop reason: HOSPADM

## 2023-03-15 RX ORDER — SODIUM CHLORIDE 9 MG/ML
40 INJECTION, SOLUTION INTRAVENOUS AS NEEDED
Status: DISCONTINUED | OUTPATIENT
Start: 2023-03-15 | End: 2023-03-18 | Stop reason: HOSPADM

## 2023-03-15 RX ORDER — VENLAFAXINE HYDROCHLORIDE 75 MG/1
75 CAPSULE, EXTENDED RELEASE ORAL DAILY
COMMUNITY

## 2023-03-15 RX ORDER — ONDANSETRON 2 MG/ML
4 INJECTION INTRAMUSCULAR; INTRAVENOUS EVERY 6 HOURS PRN
Status: DISCONTINUED | OUTPATIENT
Start: 2023-03-15 | End: 2023-03-18 | Stop reason: HOSPADM

## 2023-03-15 RX ORDER — TRAZODONE HYDROCHLORIDE 100 MG/1
300 TABLET ORAL NIGHTLY
Status: DISCONTINUED | OUTPATIENT
Start: 2023-03-15 | End: 2023-03-18 | Stop reason: HOSPADM

## 2023-03-15 RX ORDER — ONDANSETRON 2 MG/ML
4 INJECTION INTRAMUSCULAR; INTRAVENOUS ONCE
Status: COMPLETED | OUTPATIENT
Start: 2023-03-15 | End: 2023-03-15

## 2023-03-15 RX ORDER — FAMOTIDINE 20 MG/1
20 TABLET, FILM COATED ORAL 2 TIMES DAILY
Status: DISCONTINUED | OUTPATIENT
Start: 2023-03-15 | End: 2023-03-16

## 2023-03-15 RX ORDER — ONDANSETRON 4 MG/1
4 TABLET, FILM COATED ORAL EVERY 6 HOURS PRN
Status: DISCONTINUED | OUTPATIENT
Start: 2023-03-15 | End: 2023-03-18 | Stop reason: HOSPADM

## 2023-03-15 RX ORDER — SODIUM CHLORIDE, SODIUM LACTATE, POTASSIUM CHLORIDE, CALCIUM CHLORIDE 600; 310; 30; 20 MG/100ML; MG/100ML; MG/100ML; MG/100ML
100 INJECTION, SOLUTION INTRAVENOUS CONTINUOUS
Status: DISCONTINUED | OUTPATIENT
Start: 2023-03-15 | End: 2023-03-16

## 2023-03-15 RX ORDER — CLONIDINE HYDROCHLORIDE 0.2 MG/1
0.2 TABLET ORAL 2 TIMES DAILY PRN
Status: DISCONTINUED | OUTPATIENT
Start: 2023-03-15 | End: 2023-03-18 | Stop reason: HOSPADM

## 2023-03-15 RX ORDER — KETOROLAC TROMETHAMINE 15 MG/ML
15 INJECTION, SOLUTION INTRAMUSCULAR; INTRAVENOUS EVERY 6 HOURS PRN
Status: DISCONTINUED | OUTPATIENT
Start: 2023-03-15 | End: 2023-03-16

## 2023-03-15 RX ORDER — OLANZAPINE 20 MG/1
20 TABLET, ORALLY DISINTEGRATING ORAL NIGHTLY
COMMUNITY

## 2023-03-15 RX ADMIN — CLONIDINE HYDROCHLORIDE 0.2 MG: 0.2 TABLET ORAL at 21:33

## 2023-03-15 RX ADMIN — TRAZODONE HYDROCHLORIDE 300 MG: 100 TABLET ORAL at 21:33

## 2023-03-15 RX ADMIN — Medication 5 MG: at 21:33

## 2023-03-15 RX ADMIN — ONDANSETRON 4 MG: 2 INJECTION INTRAMUSCULAR; INTRAVENOUS at 14:40

## 2023-03-15 RX ADMIN — SODIUM CHLORIDE 1000 ML: 9 INJECTION, SOLUTION INTRAVENOUS at 14:41

## 2023-03-15 RX ADMIN — KETOROLAC TROMETHAMINE 15 MG: 15 INJECTION, SOLUTION INTRAMUSCULAR; INTRAVENOUS at 18:45

## 2023-03-15 RX ADMIN — HYDROMORPHONE HYDROCHLORIDE 1 MG: 1 INJECTION, SOLUTION INTRAMUSCULAR; INTRAVENOUS; SUBCUTANEOUS at 14:41

## 2023-03-15 RX ADMIN — SODIUM CHLORIDE, POTASSIUM CHLORIDE, SODIUM LACTATE AND CALCIUM CHLORIDE 100 ML/HR: 600; 310; 30; 20 INJECTION, SOLUTION INTRAVENOUS at 18:45

## 2023-03-15 RX ADMIN — IOPAMIDOL 100 ML: 755 INJECTION, SOLUTION INTRAVENOUS at 15:45

## 2023-03-15 RX ADMIN — GABAPENTIN 600 MG: 300 CAPSULE ORAL at 22:19

## 2023-03-15 RX ADMIN — ONDANSETRON 4 MG: 2 INJECTION INTRAMUSCULAR; INTRAVENOUS at 22:19

## 2023-03-15 RX ADMIN — Medication 10 ML: at 20:18

## 2023-03-15 RX ADMIN — QUETIAPINE FUMARATE 300 MG: 200 TABLET ORAL at 21:33

## 2023-03-15 NOTE — ED PROVIDER NOTES
Time: 1:27 PM EDT  Date of encounter:  3/15/2023  Independent Historian/Clinical History and Information was obtained by:   Patient and Consulting Physician (ZACK Oliva, at Dr. Mason's office)  Chief Complaint   Patient presents with   • Abdominal Pain     Pain in legs and feet.        History is limited by: N/A    History of Present Illness:  Patient is a 32 y.o. year old male who presents to the emergency department for evaluation of abdominal pain and leg pain.       Prior to patient's arrival to the ED we received a phone call from Radhika Monroe at Dr. Mason's office.  She advises that the patient was seen in her office today and advised to come to the ER for evaluation and admission.  Patient has Crohn's disease and is noncompliant.  He established care with them in January and he was ordered a colonoscopy and stool samples.  Patient did not properly prep for the colonoscopy and he did not provide stool samples for them.  He arrived at their office today just requesting another prescription for steroids.  She says that he has not been eating and he has swelling.  He was seen here in the emergency department for swelling and diarrhea on 8 March.  She says his albumin was 1.9.  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  She request that we do stool samples and also wants him to be admitted.      Abdominal Pain  Pain location:  Generalized  Pain quality: stabbing    Pain severity:  Severe  Timing:  Constant  Associated symptoms: diarrhea    Associated symptoms: no chest pain, no chills, no cough, no dysuria, no fever, no nausea, no shortness of breath, no sore throat and no vomiting    patient states that he was sent by his Gastroenterologist to ED for admission.  Patient states that his pain is constant and is 8/10 at rest and 9/10 with activity.     Patient Care Team  Primary Care Provider: Provider, No Known    Past Medical History:     Allergies    Allergen Reactions   • Elemental Sulfur Swelling   • Naloxone Swelling   • Tylenol [Acetaminophen] Swelling     Past Medical History:   Diagnosis Date   • Crohn's disease (HCC)      Past Surgical History:   Procedure Laterality Date   • COLON SURGERY     • SIGMOIDOSCOPY N/A 02/14/2023    Procedure: SIGMOIDOSCOPY FLEXIBLE;  Surgeon: Valentin Mason MD;  Location: [facility] ENDOSCOPY;  Service: Gastroenterology;  Laterality: N/A;  POOR PREP   • STOMACH SURGERY       Family History   Problem Relation Age of Onset   • Colon cancer Neg Hx        Home Medications:  Prior to Admission medications    Medication Sig Start Date End Date Taking? Authorizing Provider   buprenorphine-naloxone (SUBOXONE) 8-2 MG per SL tablet Place 1 tablet under the tongue Daily.    Van Segura MD   ciprofloxacin (CIPRO) 500 MG tablet Take 1 tablet by mouth Every 12 (Twelve) Hours.  Patient not taking: Reported on 3/15/2023 1/20/23   Janel Ernandez APRN   cloNIDine (CATAPRES) 0.2 MG tablet Take 1 tablet by mouth 2 (Two) Times a Day.    Van Segura MD   dicyclomine (BENTYL) 20 MG tablet Take 1 tablet by mouth Every 6 (Six) Hours As Needed (abdominal pain). 3/8/23   Maverick Quintero MD   famotidine (PEPCID) 40 MG tablet Take 40 mg by mouth Daily.  Patient not taking: Reported on 3/15/2023    Van Segura MD   furosemide (LASIX) 20 MG tablet Take 1 tablet by mouth Daily.  Patient not taking: Reported on 3/15/2023 3/8/23   Maverick Quintero MD   gabapentin (NEURONTIN) 600 MG tablet Take 1 tablet by mouth 3 (Three) Times a Day.    Van Segura MD   hydrOXYzine pamoate (VISTARIL) 50 MG capsule Take 1 capsule by mouth 3 (Three) Times a Day As Needed for Itching.    Van Segura MD   melatonin 1 MG tablet Take 3 tablets by mouth.    Van Segura MD   metroNIDAZOLE (FLAGYL) 500 MG tablet Take 1 tablet by mouth 2 (Two) Times a Day.  Patient not taking: Reported on 3/15/2023 1/20/23   Awais  ZACK Burnett   OLANZapine (zyPREXA) 15 MG tablet Take 1 tablet by mouth Every Night.    Van Segura MD   omeprazole (priLOSEC) 40 MG capsule Take 1 capsule by mouth Daily.    Van Segura MD   ondansetron ODT (ZOFRAN-ODT) 4 MG disintegrating tablet Place 1 tablet on the tongue Every 8 (Eight) Hours As Needed for Nausea or Vomiting. 1/20/23   Janel Ernandez APRN   polyethylene glycol (GoLYTELY) 236 g solution Starting at noon on day prior to procedure, drink 8 ounces every 30 minutes until all gone or stools are clear. May add flavor packet.  Patient not taking: Reported on 3/15/2023 2/14/23   Valentin Mason MD   QUEtiapine (SEROquel) 300 MG tablet Take 1 tablet by mouth Every Night.    Van Segura MD   traZODone (DESYREL) 100 MG tablet Take 3 tablets by mouth Every Night.    Van Segura MD   venlafaxine (EFFEXOR) 75 MG tablet Take 1 tablet by mouth Daily.    Van Segura MD        Social History:   Social History     Tobacco Use   • Smoking status: Every Day     Packs/day: 1.00     Years: 10.00     Pack years: 10.00     Types: Cigarettes   • Smokeless tobacco: Never   Vaping Use   • Vaping Use: Every day   Substance Use Topics   • Alcohol use: Not Currently   • Drug use: Not Currently         Review of Systems:  Review of Systems   Constitutional: Negative for chills and fever.   HENT: Negative for congestion, rhinorrhea and sore throat.    Eyes: Negative for pain and visual disturbance.   Respiratory: Negative for apnea, cough, chest tightness and shortness of breath.    Cardiovascular: Negative for chest pain and palpitations.   Gastrointestinal: Positive for abdominal pain and diarrhea. Negative for nausea and vomiting.   Genitourinary: Negative for difficulty urinating and dysuria.   Musculoskeletal: Negative for joint swelling and myalgias.   Skin: Negative for color change.   Neurological: Negative for seizures and headaches.  "  Psychiatric/Behavioral: Negative.    All other systems reviewed and are negative.       Physical Exam:  /73   Pulse 73   Temp 98.4 °F (36.9 °C) (Oral)   Resp 18   Ht 175.3 cm (69\")   Wt 86.3 kg (190 lb 4.1 oz)   SpO2 99%   BMI 28.10 kg/m²     Physical Exam  Vitals and nursing note reviewed.   Constitutional:       General: He is not in acute distress.     Appearance: Normal appearance. He is not toxic-appearing.   HENT:      Head: Normocephalic and atraumatic.      Jaw: There is normal jaw occlusion.   Eyes:      General: Lids are normal.      Extraocular Movements: Extraocular movements intact.      Conjunctiva/sclera: Conjunctivae normal.      Pupils: Pupils are equal, round, and reactive to light.   Cardiovascular:      Rate and Rhythm: Normal rate and regular rhythm.      Pulses: Normal pulses.      Heart sounds: Normal heart sounds.   Pulmonary:      Effort: Pulmonary effort is normal. No respiratory distress.      Breath sounds: Normal breath sounds. No wheezing or rhonchi.   Abdominal:      General: Abdomen is flat.      Palpations: Abdomen is soft.      Tenderness: There is generalized abdominal tenderness. There is no guarding or rebound.   Musculoskeletal:         General: Normal range of motion.      Cervical back: Normal range of motion and neck supple.      Right lower leg: No edema.      Left lower leg: No edema.   Skin:     General: Skin is warm and dry.   Neurological:      Mental Status: He is alert and oriented to person, place, and time. Mental status is at baseline.   Psychiatric:         Mood and Affect: Mood normal.                  Procedures:  Procedures      Medical Decision Making:      Comorbidities that affect care:    Crohn's disease     External Notes reviewed:    Previous Clinic Note: procedure note by Dr. Mason for Chron' disease      The following orders were placed and all results were independently analyzed by me:  Orders Placed This Encounter   Procedures   • " Clostridioides difficile Toxin - Stool, Per Rectum   • Gastrointestinal Panel, PCR - Stool, Per Rectum   • Clostridioides difficile Toxin, PCR - Stool, Per Rectum   • CT Abdomen Pelvis With Contrast   • [place] Draw   • Comprehensive Metabolic Panel   • Lipase   • Urinalysis With Microscopic If Indicated (No Culture) - Urine, Clean Catch   • Lactic Acid, Plasma   • Fecal Lactoferrin Qual. - Stool, Per Rectum   • Occult Blood X 1, Stool - Stool, Per Rectum   • CBC Auto Differential   • Comprehensive Metabolic Panel   • CBC (No Diff)   • Magnesium   • Phosphorus   • Protime-INR   • NPO Diet NPO Type: Strict NPO   • Diet: Liquid Diets; Clear Liquid; Texture: Regular Texture (IDDSI 7); Fluid Consistency: Thin (IDDSI 0)   • Vital Signs   • Notify Provider (With Default Parameters)   • Notify Provider (Specify Parameters)   • Intake & Output   • Weigh Patient   • Oral Care   • Saline Lock & Maintain IV Access   • Place Sequential Compression Device   • Maintain Sequential Compression Device   • Activity - Ad Lakesha   • Code Status and Medical Interventions:   • Inpatient Gastroenterology Consult   • Inpatient Hospitalist Consult   • Patient Isolation Contact Spore   • Oxygen Therapy- Nasal Cannula; Titrate for SPO2: 90% - 95%   • Insert Peripheral IV   • Insert Peripheral IV   • Inpatient Admission   • CBC & Differential   • Green Top (Gel)   • Lavender Top   • Gold Top - SST   • Light Blue Top       Medications Given in the Emergency Department:  Medications   sodium chloride 0.9 % flush 10 mL (has no administration in time range)   sodium chloride 0.9 % flush 10 mL (has no administration in time range)   sodium chloride 0.9 % flush 10 mL (has no administration in time range)   sodium chloride 0.9 % infusion 40 mL (has no administration in time range)   ondansetron (ZOFRAN) tablet 4 mg (has no administration in time range)     Or   ondansetron (ZOFRAN) injection 4 mg (has no administration in time range)   lactated ringers  infusion (has no administration in time range)   ketorolac (TORADOL) injection 15 mg (has no administration in time range)   famotidine (PEPCID) tablet 20 mg (has no administration in time range)   sodium chloride 0.9 % bolus 1,000 mL (0 mL Intravenous Stopped 3/15/23 1655)   ondansetron (ZOFRAN) injection 4 mg (4 mg Intravenous Given 3/15/23 1440)   HYDROmorphone (DILAUDID) injection 1 mg (1 mg Intravenous Given 3/15/23 1441)   iopamidol (ISOVUE-370) 76 % injection 100 mL (100 mL Intravenous Given 3/15/23 1545)        ED Course:    The patient was initially evaluated in the triage area where orders were placed. The patient was later dispositioned by Christo Thomas MD.      The patient was advised to stay for completion of workup which includes but is not limited to communication of labs and radiological results, reassessment and plan. The patient was advised that leaving prior to disposition by a provider could result in critical findings that are not communicated to the patient.          Labs:    Lab Results (last 24 hours)     Procedure Component Value Units Date/Time    CBC & Differential [[id number]]  (Abnormal) Collected: 03/15/23 1431    Specimen: Blood Updated: 03/15/23 1434    Narrative:      The following orders were created for panel order CBC & Differential.  Procedure                               Abnormality         Status                     ---------                               -----------         ------                     CBC Auto Differential[[id number]]        Abnormal            Final result                 Please view results for these tests on the individual orders.    Comprehensive Metabolic Panel [[id number]]  (Abnormal) Collected: 03/15/23 1431    Specimen: Blood Updated: 03/15/23 1458     Glucose 84 mg/dL      BUN 16 mg/dL      Creatinine 0.90 mg/dL      Sodium 140 mmol/L      Potassium 3.9 mmol/L      Chloride 107 mmol/L      CO2 27.8 mmol/L      Calcium 7.4 mg/dL      Total Protein  4.6 g/dL      Albumin 1.7 g/dL      ALT (SGPT) 11 U/L      AST (SGOT) 12 U/L      Alkaline Phosphatase 117 U/L      Total Bilirubin 0.2 mg/dL      Globulin 2.9 gm/dL      A/G Ratio 0.6 g/dL      BUN/Creatinine Ratio 17.8     Anion Gap 5.2 mmol/L      eGFR 114.9 mL/min/1.73     Narrative:      GFR Normal >60  Chronic Kidney Disease <60  Kidney Failure <15      Lipase [[id number]]  (Normal) Collected: 03/15/23 1431    Specimen: Blood Updated: 03/15/23 1458     Lipase 13 U/L     Lactic Acid, Plasma [[id number]]  (Normal) Collected: 03/15/23 1431    Specimen: Blood Updated: 03/15/23 1456     Lactate 1.5 mmol/L     CBC Auto Differential [[id number]]  (Abnormal) Collected: 03/15/23 1431    Specimen: Blood Updated: 03/15/23 1434     WBC 6.00 10*3/mm3      RBC 3.33 10*6/mm3      Hemoglobin 10.3 g/dL      Hematocrit 31.2 %      MCV 93.7 fL      MCH 30.9 pg      MCHC 33.0 g/dL      RDW 13.9 %      RDW-SD 47.1 fl      MPV 9.3 fL      Platelets 350 10*3/mm3      Neutrophil % 67.6 %      Lymphocyte % 19.8 %      Monocyte % 10.8 %      Eosinophil % 1.2 %      Basophil % 0.3 %      Immature Grans % 0.3 %      Neutrophils, Absolute 4.05 10*3/mm3      Lymphocytes, Absolute 1.19 10*3/mm3      Monocytes, Absolute 0.65 10*3/mm3      Eosinophils, Absolute 0.07 10*3/mm3      Basophils, Absolute 0.02 10*3/mm3      Immature Grans, Absolute 0.02 10*3/mm3      nRBC 0.0 /100 WBC     Phosphorus [[id number]]  (Normal) Collected: 03/15/23 1431    Specimen: Blood Updated: 03/15/23 1701     Phosphorus 3.5 mg/dL     Protime-INR [[id number]]  (Abnormal) Collected: 03/15/23 1431    Specimen: Blood Updated: 03/15/23 1758     Protime 15.0 Seconds      INR 1.18    Narrative:      Suggested Therapeutic Ranges For Oral Anticoagulant Therapy:  Level of Therapy                      INR Target Range  Standard Dose                            2.0-3.0  High Dose                                2.5-3.5  Patients not receiving anticoagulant  Therapy Normal Range                      0.86-1.15           Imaging:    CT Abdomen Pelvis With Contrast    Result Date: 3/15/2023  PROCEDURE: CT ABDOMEN PELVIS W CONTRAST  COMPARISON: [facility], CT, CT ABDOMEN PELVIS W CONTRAST, 1/20/2023, 12:22.  INDICATIONS: mid abdominal pain, nausea  PROTOCOL:   Standard imaging protocol performed    RADIATION:   DLP: 443.5mGy*cm   Automated exposure control was utilized to minimize radiation dose. CONTRAST: 100cc Isovue 370 I.V.  TECHNIQUE: Axial images of the abdomen and pelvis with intravenous contrast.  ABDOMEN:  There is patchy airspace consolidation in the right middle lobe.  There are areas of ground-glass opacity and bronchial wall thickening in the lower lobes.  There is fatty infiltration of the liver.  The spleen, pancreas, adrenal glands and kidneys are normal.  There are no inflammatory changes around the gallbladder.  PELVIS:  There are dilated fluid-filled loops of large and small bowel.  No evidence of pneumoperitoneum.  No evidence of abscess.  There is mesenteric adenopathy.  The abdominal aorta has a normal caliber.  There is avascular necrosis of the left femoral head without evidence of significant collapse.  There is a large amount of stool in the rectum.  The urinary bladder is normal.  There appears to be surgical suture line in the region of the distal ileum.  IMPRESSION:  1. Dilated fluid-filled loops of large and small bowel suggesting extensive ileus.  2. New patchy airspace consolidation in the right middle lobe.  New areas of ground-glass opacity and bronchial wall thickening in the lower lobes.  Suggest correlation with any history of aspiration pneumonia  3. Fatty infiltration of the liver.  4. Mesenteric adenopathy likely related to the patient's history of inflammatory bowel disease.   5. Avascular necrosis of the left femoral head.   FREDY MARINA MD       Electronically Signed and Approved By: FREDY MARINA MD on 3/15/2023 at 16:04                  Differential Diagnosis and Discussion:      Abdominal Pain: Based on the patient's signs and symptoms, I considered abdominal aortic aneurysm, small bowel obstruction, pancreatitis, acute cholecystitis, acute appendecitis, peptic ulcer disease, gastritis, colitis, endocrine disorders, irritable bowel syndrome and other differential diagnosis an etiology of the patient's abdominal pain.  Diarrhea: Differential diagnosis includes but is not limited to malabsorption syndrome, bacterial infection, carcinoid syndrome, pancreatic hypersecretion, viral infection, celiac sprue, Crohn's disease, ulcerative colitis, ischemic colitis, colitis, hypermotility, and irritable bowel syndrome.    All labs were reviewed and interpreted by me.    MDM  Number of Diagnoses or Management Options  Diagnosis management comments: The patient´s CBC that was reviewed and interpreted by me shows no abnormalities of critical concern. Of note, there is no anemia requiring a blood transfusion and the platelet count is acceptable.  The patient´s CMP that was reviewed and interpretted by me shows no abnormalities of critical concern. Of note, the patient´s sodium and potassium are acceptable. The patient´s liver enzymes are unremarkable. The patient´s renal function (creatinine) is preserved. The patient has a normal anion gap.  INR is 1.1.  Lipase is 13.  Lactic acid is 1.5.  CT scan of the abdomen pelvis shows some dilated fluid loops consistent with an ileus.  Case was discussed with Radhika Wilkins who agrees that the patient should be admitted and states that she reached out to Dr. Kincaid.  Case was discussed with Dr. Liu who agrees to admit the patient.       Amount and/or Complexity of Data Reviewed  Clinical lab tests: reviewed  Tests in the radiology section of CPT®: reviewed  Decide to obtain previous medical records or to obtain history from someone other than the patient: yes  Obtain history from someone other than the  patient: yes  Discuss the patient with other providers: (16:20 EDT - Consult with ZACK Oliva, at Dr. Mason's office - Discussed patient's case, history, and current condition.   )  Independent visualization of images, tracings, or specimens: yes    Risk of Complications, Morbidity, and/or Mortality  Presenting problems: moderate  Management options: moderate    Patient Progress  Patient progress: stable           Patient Care Considerations:    I did consider ordering antibiotics in the emergency department, however no bacterial focus of infection was found.      Consultants/Shared Management Plan:    Hospitalist: I have discussed the case with Dr. Liu who agrees to accept the patient for admission.    Social Determinants of Health:    Patient is independent, reliable, and has access to care.       Disposition and Care Coordination:    Admit:   Through independent evaluation of the patient's history, physical, and imperical data, the patient meets criteria for observation/admission to the hospital.        Final diagnoses:   Abdominal pain, unspecified abdominal location        ED Disposition     ED Disposition   Decision to Admit    Condition   --    Comment   Level of Care: Med/Surg [1]   Diagnosis: Abdominal pain [590059]   Admitting Physician: PALAK LIU [[id number]]   Attending Physician: PALAK LIU [[id number]]   Isolate for COVID?: No [0]   Certification: I Certify That Inpatient Hospital Services Are Medically Necessary For Greater Than 2 Midnights               This medical record created using voice recognition software.  Documentation assistance provided by Emma Keller acting as scribe for  Christo Allan MD . Information recorded by the scribe was done at my direction and has been verified and validated by me.           Emma Keller  03/15/23 1353       Emma Keller  03/15/23 5061       Christo Thomas MD  03/15/23 [id number]

## 2023-03-15 NOTE — PLAN OF CARE
Goal Outcome Evaluation:  Plan of Care Reviewed With: patient        Progress: no change  Outcome Evaluation: Pt was a new admit from ED. Pt stated he had a BM in the ED, no specimen recorded at this time. Pt given speci hat for new specimen. Pt stated he has only had one BM today, does not believe he will be able to go again today. Pt medicated per MAR for pain. Pt in no apparent distress at this time, denies any needs at this time, call light in reach.

## 2023-03-15 NOTE — PROGRESS NOTES
Patient Name: Abhijeet Pitts   Visit Date: 03/15/2023   Patient ID: [id number]  Provider: ZACK Barreto    Sex: male  Location:  Location Address:  Location Phone: [address]  [place] [address] [phone]    YOB: 1990  Age: 32 y.o.   Primary Care Provider Provider, No Known      Referring Provider: No ref. provider found        Chief Complaint  Diarrhea (Every Bm, 4-5 daily, liquid) and Abdominal Pain (After every meal center ABD)    History of Present Illness    Patient initially presented 1/25/2023 in follow-up from ER visit.  Patient diagnosed with Crohn's at age 16, biologic naïve but reported previous treatment with antibiotics/steroids, reported history of fistulas and small bowel resection x2, reported seen by colorectal surgeon and GI 8 years ago in [place] but had not followed up with GI since then.  Had complaint of abdominal pain with meals, uncontrollable diarrhea with fecal incontinence, 10 stools per day.  Some improvement with prednisone and Flagyl and Cipro given by the ER.  ER visit 1/20/23: labs below; CT of the abdomen and pelvis with contrast 1/20/2023: Liver pancreas and spleen are within normal limits, multiple fluid-filled distended loops of small bowel, there is bowel wall thickening involving the terminal ileum, small bowel is fluid-filled and distended to the level of the terminal ileum, clips noted may be related to small bowel resection--findings c/w partial SBO; multiple mildly enlarged mesenteric lymph nodes likely reactive in etiology, no retroperitoneal adenopathy  PLAN: colonoscopy, Labs ordered in anticipation for biologic Therapy, stool studies ordered, none of these things were completed     Colonoscopy 2/14/2023: Moderate amount of stool found the rectosigmoid colon lavage performed but poor visualization. Reports trouble drinking prep    Pt states he's barely eating d/t epigastric abd pain, he describes pain after meals, and also describes  "something feeling stuck in upper abdomen after eating, he's having frequent diarrhea, watery 5-7 x day, +nocturnal. Has had FI in the night during sleep also   Pt states lightheadedness comes and goes.  Initial BP here today 95/50  He's working 6 days a week at [employer], worked late last night, appeared sleepy today initially  Pt did not drive here today, brought by a family member.    Went to ER recently for LE edema and abd pain 3/8/23  Past Medical History:   Diagnosis Date   • Crohn's disease (HCC)        Past Surgical History:   Procedure Laterality Date   • COLON SURGERY     • SIGMOIDOSCOPY N/A 02/14/2023    Procedure: SIGMOIDOSCOPY FLEXIBLE;  Surgeon: Valentin Mason MD;  Location: [facility] ENDOSCOPY;  Service: Gastroenterology;  Laterality: N/A;  POOR PREP   • STOMACH SURGERY         Allergies   Allergen Reactions   • Elemental Sulfur Swelling   • Naloxone Swelling   • Tylenol [Acetaminophen] Swelling       Family History   Problem Relation Age of Onset   • Colon cancer Neg Hx         Social History     Tobacco Use   • Smoking status: Every Day     Packs/day: 1.00     Years: 20.00     Pack years: 20.00     Types: Cigarettes   Vaping Use   • Vaping Use: Never used   Substance Use Topics   • Alcohol use: Not Currently   • Drug use: Not Currently       Objective     Vital Signs:   BP 97/57 (BP Location: Left arm, Patient Position: Sitting, Cuff Size: Adult)   Pulse 96   Ht 175.3 cm (69\")   Wt 83.4 kg (183 lb 12.8 oz)   BMI 27.14 kg/m²       Physical Exam  Constitutional:       General: The patient is not in acute distress.     Appearance: Normal appearance.   HENT:      Head: Normocephalic and atraumatic.      Nose: Nose normal.   Pulmonary:      Effort: Pulmonary effort is normal. No respiratory distress.   Abdominal:      General: Abdomen is flat.      Palpations: Abdomen is soft. There is no mass.      Tenderness: There is no abdominal tenderness. There is no guarding.   Musculoskeletal:      " Cervical back: Neck supple.      Right lower leg: No edema.      Left lower leg: No edema.   Skin:     General: Skin is warm and dry.   Neurological:      General: No focal deficit present.      Mental Status: The patient is alert and oriented to person, place, and time.      Gait: Gait normal.   Psychiatric:         Mood and Affect: Mood normal.         Speech: Speech normal.         Behavior: Behavior normal.         Thought Content: Thought content normal.     Result Review :   The following data was reviewed by: ZACK Barreto on 03/15/2023:    CBC w/diff    CBC w/Diff 1/20/23 3/8/23   WBC 6.20 7.81   RBC 4.17 3.39 (A)   Hemoglobin 13.1 10.5 (A)   Hematocrit 39.9 31.6 (A)   MCV 95.7 93.2   MCH 31.4 31.0   MCHC 32.8 33.2   RDW 14.9 13.6   Platelets 395 295   Neutrophil Rel % 65.5 73.4   Immature Granulocyte Rel % 0.3 0.4   Lymphocyte Rel % 21.5 16.6 (A)   Monocyte Rel % 11.9 9.1   Eosinophil Rel % 0.6 0.4   Basophil Rel % 0.2 0.1   (A) Abnormal value            CMP    CMP 1/20/23 3/8/23   Glucose 113 (A) 86   BUN 19 13   Creatinine 1.32 (A) 0.83   eGFR 72.6 117.8   Sodium 139 142   Potassium 4.1 3.8   Chloride 104 111 (A)   Calcium 8.4 (A) 7.3 (A)   Total Protein 6.0 4.2 (A)   Albumin 3.0 (A) 1.9 (A)   Globulin 3.0 2.3   Total Bilirubin 0.3 0.2   Alkaline Phosphatase 126 (A) 114   AST (SGOT) 11 13   ALT (SGPT) 10 15   Albumin/Globulin Ratio 1.0 0.8   BUN/Creatinine Ratio 14.4 15.7   Anion Gap 8.3 4.4 (A)   (A) Abnormal value                          Assessment and Plan    Diagnoses and all orders for this visit:    1. Epigastric pain (Primary)    2. Diarrhea, unspecified type    3. Incontinence of feces, unspecified fecal incontinence type    4. Hypotension, unspecified hypotension type    5. Low serum albumin  Comments:  likely r/t malnutrition, and now w LE edema    6. Anemia, unspecified type              Follow Up   Return for next available.   I d/w pt I was concerned about his sx and his labs  and newer LE edema which may be r/t low albumin/malnutrition. I r/w pt as he had never done stool studies I still do not know if he could have cdiff, which would be treated differently than with steroids as pt was hoping to get Prednisone Rx. I also r/w pt with poor prep we did not make any headway with Crohn's assessment on colonoscopy. I recommended he be admitted for further w/u and treatment.   Called transfer center 1125  Pt left at 1130 to go to gas station despite being told to stay in office while I was trying to get pt admitted. Pt returned at 1140.  I spoke with Dr. John who agreed patient should be admitted, however he requested patient going through the ER due to his hypotension and intermittent lightheadedness he has had.  I called the ER at 1213 and gave report to nurse practitioner Corina as well as proposed plan for admission.  I encourage patient to try to follow through with all plans given while at hospital, he verbalized understanding.   1 hr spent seeing pt and coordinating care for him    Patient was given instructions and counseling regarding his condition or for health maintenance advice. Please see specific information pulled into the AVS if appropriate.

## 2023-03-15 NOTE — H&P
[facility]  Date: 3/15/2023   Patient Name: Abhijeet Pitts  : 1990  MRN: [id number]  Primary Care Physician:  Provider, No Known  Date of admission: 3/15/2023    Subjective   Subjective     Chief Complaint: Abdominal pain, nausea vomiting diarrhea    HPI:    Abhijeet Pitts is a 32 y.o. male past medical history significant for Crohn's disease with associated fistulas and bowel resection x2 in [place] presents with 1 week history of abdominal pain nausea vomiting diarrhea.  Pain is 10 out of 10 periumbilically radiating outward sharp in nature waxing and waning worse with p.o. intake.  Diarrhea described as watery.  Patient has been able to keep down water endorses good urination.  Patient denies fever denies chills.  Patient endorses sick contacts.  Patient endorses recent antibiotics in the past 2 weeks after visit to the ER earlier this month.  Patient presented to his gastroenterologist on the day of presentation requesting steroids.  Of note patient has developed lower extremity edema in the past couple weeks has history of low albumin.  Patient has not had stool studies performed or endoscopy due to poor prep.  Patient endorses lightheadedness and found to have BP 90s over 50s heart rate in the 90s.  Patient was sent to the emergency department for further evaluation and treatment.  In the emergency department patient was afebrile heart rate 80s to 90s blood pressure systolic 90s to 110s satting well on room air.  Abdomen very tender to palpation slightly distended though soft.  Labs negative for an albumin of 1.7 total protein of 4.6.  LFTs within normal limits.  CT abdomen pelvis demonstrated dilated fluid-filled loops of large and small bowel suggesting extensive ileus as well as patchy airspace consolidation in the right middle lobe as well as bronchial wall thickening and groundglass opacity in the lower lobes concerning for possible aspiration pneumonia.  Also  noted to have fatty infiltration of the liver, mesenteric adenopathy and avascular necrosis left femoral head.  ER attending discussed case with GI nurse practitioner who recommended stool studies.  Dr. Kincaid gastroenterology on-call contacted by GI nurse practitioner and reportedly agreed to consult.  Patient given IV fluids in the emergency department.  Hospitalist service contacted for admission for further evaluation and treatment of abdominal pain with nausea and vomiting concern for Crohn's flare versus infectious colitis with associated malnutrition.      Personal History     Past Medical History:  Past Medical History:   Diagnosis Date   • Crohn's disease (HCC)         Past Surgical History:  Past Surgical History:   Procedure Laterality Date   • COLON SURGERY     • SIGMOIDOSCOPY N/A 02/14/2023    Procedure: SIGMOIDOSCOPY FLEXIBLE;  Surgeon: Valentin Mason MD;  Location: [facility] ENDOSCOPY;  Service: Gastroenterology;  Laterality: N/A;  POOR PREP   • STOMACH SURGERY          Family History:   Family History   Problem Relation Age of Onset   • Colon cancer Neg Hx         Social History:   Social History     Socioeconomic History   • Marital status: Single   Tobacco Use   • Smoking status: Every Day     Packs/day: 1.00     Years: 10.00     Pack years: 10.00     Types: Cigarettes   • Smokeless tobacco: Never   Vaping Use   • Vaping Use: Every day   Substance and Sexual Activity   • Alcohol use: Not Currently   • Drug use: Not Currently   • Sexual activity: Defer        Home Medications:  OLANZapine zydis, PEG-KCl-NaCl-NaSulf-Na Asc-C, QUEtiapine, buprenorphine, cloNIDine, dicyclomine, gabapentin, hydrOXYzine pamoate, melatonin, omeprazole, ondansetron ODT, traZODone, and venlafaxine XR    Allergies:  Allergies   Allergen Reactions   • Elemental Sulfur Swelling   • Naloxone Swelling   • Tylenol [Acetaminophen] Swelling       Review of Systems   Constitutional: Negative for fatigue and fever.   HENT:  Negative for sore throat and trouble swallowing.    Eyes: Negative for pain and discharge.   Respiratory: Negative for cough and shortness of breath.    Cardiovascular: Negative for chest pain and palpitations.   Gastrointestinal: Positive for abdominal pain, nausea and vomiting.   Endocrine: Negative for cold intolerance and heat intolerance.   Genitourinary: Negative for difficulty urinating and dysuria.   Musculoskeletal: Negative for back pain and neck stiffness.   Skin: Negative for color change and rash.   Neurological: Negative for syncope and headaches.   Hematological: Negative for adenopathy.   Psychiatric/Behavioral: Negative for confusion and hallucinations.    Objective   Objective     Vitals:   Temp:  [98.4 °F (36.9 °C)] 98.4 °F (36.9 °C)  Heart Rate:  [80-98] 80  Resp:  [18] 18  BP: ()/(50-74) 93/50    Physical Exam   Gen. well-developed appearing stated age in no acute distress  HEENT: Normocephalic atraumatic moist membranes pupils equal round reactive light, no scleral icterus no conjunctival injection  Cardiovascular: regular rate and rhythm no murmurs rubs or gallops S1-S2, no lower extremity edema appreciated  Pulmonary: Clear to auscultation bilaterally no wheezes rales or rhonchi symmetric chest expansion, unlabored, no conversational dyspnea appreciated  Gastrointestinal: Soft diffusely tender slightly distended positive bowel sounds all 4 quadrants no rebound or guarding  Musculoskeletal: No clubbing cyanosis, warm and well-perfused, calves soft symmetric nontender bilaterally  Skin: Clean dry without rashes  Neuro: Cranial nerves II through XII intact grossly no sensorimotor deficits appreciated bilateral upper and lower extremities  Psych: Patient is calm cooperative and appropriate with exam not responding to internal stimuli  : No Vanessa catheter no bladder distention no suprapubic tenderness    Result Review    Result Review:  I have personally reviewed the results from the time  of this admission to 3/15/2023 17:29 EDT and agree with these findings:  [x]  Laboratory  LAB RESULTS:      Lab 03/15/23  1431   WBC 6.00   HEMOGLOBIN 10.3*   HEMATOCRIT 31.2*   PLATELETS 350   NEUTROS ABS 4.05   IMMATURE GRANS (ABS) 0.02   LYMPHS ABS 1.19   MONOS ABS 0.65   EOS ABS 0.07   MCV 93.7   LACTATE 1.5         Lab 03/15/23  1431   SODIUM 140   POTASSIUM 3.9   CHLORIDE 107   CO2 27.8   ANION GAP 5.2   BUN 16   CREATININE 0.90   EGFR 114.9   GLUCOSE 84   CALCIUM 7.4*   PHOSPHORUS 3.5         Lab 03/15/23  1431   TOTAL PROTEIN 4.6*   ALBUMIN 1.7*   GLOBULIN 2.9   ALT (SGPT) 11   AST (SGOT) 12   BILIRUBIN 0.2   ALK PHOS 117   LIPASE 13                     Brief Urine Lab Results  (Last result in the past 365 days)      Color   Clarity   Blood   Leuk Est   Nitrite   Protein   CREAT   Urine HCG        01/20/23 1450 Yellow   Clear   Negative   Negative   Negative   Negative               Microbiology Results (last 10 days)     ** No results found for the last 240 hours. **          []  Microbiology  [x]  Radiology  XR Chest 1 View    Result Date: 3/8/2023    Subsegmental atelectasis is seen at the left lung base.       CHELSIE ALEJANDRO MD       Electronically Signed and Approved By: CHELSIE ALEJANDRO MD on 3/08/2023 at 16:13               []  EKG/Telemetry   []  Cardiology/Vascular   []  Pathology  []  Old records  [x]  Other:  Scheduled Meds:famotidine, 20 mg, Oral, BID  sodium chloride, 10 mL, Intravenous, Q12H      Continuous Infusions:lactated ringers, 100 mL/hr      PRN Meds:.•  ketorolac  •  ondansetron **OR** ondansetron  •  sodium chloride  •  sodium chloride  •  sodium chloride        Assessment & Plan   Assessment / Plan     Assessment/Plan:   Abdominal pain with nausea vomiting and diarrhea  Concern for Crohn's flare versus infectious colitis  Concern for aspiration pneumonitis  Fatty infiltration of the liver  Avascular necrosis of the left femoral head  Malnutrition      Patient admitted for further  evaluation and treatment  Gastroenterology consulted thank you for your assistance  Continue IV fluids  Continue IV analgesics  Continue antiemetics  Continue to monitor electrolytes and replace as needed  Get C. difficile toxin, lactoferrin, and bacterial panel as well as occult blood  Results of stool studies will guide treatment  Get INR  Start clears  Make n.p.o. at midnight until patient is seen by GI  We will provide supplemental protein once cleared for diet by GI  Further inpatient was recommendations pending clinical course    Discussed case with patient's nurse at the bedside as well as ED attending.    Disposition: Patient likely be able to discharge home once tolerating a diet abdominal pain and diarrhea improve        DVT prophylaxis:  Mechanical DVT prophylaxis orders are present.    CODE STATUS:    Code Status (Patient has no pulse and is not breathing): CPR (Attempt to Resuscitate)  Medical Interventions (Patient has pulse or is breathing): Full Support      Admission Status:  I believe this patient meets inpatient status.

## 2023-03-16 LAB
ALBUMIN SERPL-MCNC: 1.7 G/DL (ref 3.5–5.2)
ALBUMIN/GLOB SERPL: 0.6 G/DL
ALP SERPL-CCNC: 106 U/L (ref 39–117)
ALT SERPL W P-5'-P-CCNC: 12 U/L (ref 1–41)
ANION GAP SERPL CALCULATED.3IONS-SCNC: 6.4 MMOL/L (ref 5–15)
AST SERPL-CCNC: 14 U/L (ref 1–40)
BILIRUB SERPL-MCNC: 0.3 MG/DL (ref 0–1.2)
BUN SERPL-MCNC: 18 MG/DL (ref 6–20)
BUN/CREAT SERPL: 20.2 (ref 7–25)
C COLI+JEJ+UPSA DNA STL QL NAA+NON-PROBE: NOT DETECTED
CALCIUM SPEC-SCNC: 7.8 MG/DL (ref 8.6–10.5)
CHLORIDE SERPL-SCNC: 108 MMOL/L (ref 98–107)
CO2 SERPL-SCNC: 24.6 MMOL/L (ref 22–29)
CREAT SERPL-MCNC: 0.89 MG/DL (ref 0.76–1.27)
CRP SERPL-MCNC: 0.32 MG/DL (ref 0–0.5)
CRYPTOSP DNA STL QL NAA+NON-PROBE: NOT DETECTED
DEPRECATED RDW RBC AUTO: 46.8 FL (ref 37–54)
E HISTOLYT DNA STL QL NAA+NON-PROBE: NOT DETECTED
EC STX1+STX2 GENES STL QL NAA+NON-PROBE: NOT DETECTED
EGFRCR SERPLBLD CKD-EPI 2021: 115.3 ML/MIN/1.73
ERYTHROCYTE [DISTWIDTH] IN BLOOD BY AUTOMATED COUNT: 13.9 % (ref 12.3–15.4)
ERYTHROCYTE [SEDIMENTATION RATE] IN BLOOD: 3 MM/HR (ref 0–15)
G LAMBLIA DNA STL QL NAA+NON-PROBE: NOT DETECTED
GLOBULIN UR ELPH-MCNC: 2.7 GM/DL
GLUCOSE SERPL-MCNC: 132 MG/DL (ref 65–99)
HCT VFR BLD AUTO: 29.8 % (ref 37.5–51)
HGB BLD-MCNC: 9.7 G/DL (ref 13–17.7)
MAGNESIUM SERPL-MCNC: 1.3 MG/DL (ref 1.6–2.6)
MCH RBC QN AUTO: 30.3 PG (ref 26.6–33)
MCHC RBC AUTO-ENTMCNC: 32.6 G/DL (ref 31.5–35.7)
MCV RBC AUTO: 93.1 FL (ref 79–97)
PLATELET # BLD AUTO: 348 10*3/MM3 (ref 140–450)
PMV BLD AUTO: 9.4 FL (ref 6–12)
POTASSIUM SERPL-SCNC: 3.6 MMOL/L (ref 3.5–5.2)
PROT SERPL-MCNC: 4.4 G/DL (ref 6–8.5)
RBC # BLD AUTO: 3.2 10*6/MM3 (ref 4.14–5.8)
S ENT+BONG DNA STL QL NAA+NON-PROBE: NOT DETECTED
SHIGELLA SP+EIEC IPAH ST NAA+NON-PROBE: NOT DETECTED
SODIUM SERPL-SCNC: 139 MMOL/L (ref 136–145)
WBC NRBC COR # BLD: 4.42 10*3/MM3 (ref 3.4–10.8)

## 2023-03-16 PROCEDURE — 25010000002 KETOROLAC TROMETHAMINE PER 15 MG: Performed by: FAMILY MEDICINE

## 2023-03-16 PROCEDURE — 0 MAGNESIUM SULFATE 4 GM/100ML SOLUTION: Performed by: FAMILY MEDICINE

## 2023-03-16 PROCEDURE — 80053 COMPREHEN METABOLIC PANEL: CPT | Performed by: FAMILY MEDICINE

## 2023-03-16 PROCEDURE — 99233 SBSQ HOSP IP/OBS HIGH 50: CPT | Performed by: FAMILY MEDICINE

## 2023-03-16 PROCEDURE — 85027 COMPLETE CBC AUTOMATED: CPT | Performed by: FAMILY MEDICINE

## 2023-03-16 PROCEDURE — 85652 RBC SED RATE AUTOMATED: CPT | Performed by: INTERNAL MEDICINE

## 2023-03-16 PROCEDURE — 86140 C-REACTIVE PROTEIN: CPT | Performed by: INTERNAL MEDICINE

## 2023-03-16 PROCEDURE — 25010000002 METHYLPREDNISOLONE PER 125 MG: Performed by: INTERNAL MEDICINE

## 2023-03-16 PROCEDURE — 83735 ASSAY OF MAGNESIUM: CPT | Performed by: FAMILY MEDICINE

## 2023-03-16 PROCEDURE — 99222 1ST HOSP IP/OBS MODERATE 55: CPT | Performed by: INTERNAL MEDICINE

## 2023-03-16 RX ORDER — METHYLPREDNISOLONE SODIUM SUCCINATE 125 MG/2ML
60 INJECTION, POWDER, LYOPHILIZED, FOR SOLUTION INTRAMUSCULAR; INTRAVENOUS DAILY
Status: DISCONTINUED | OUTPATIENT
Start: 2023-03-16 | End: 2023-03-18 | Stop reason: HOSPADM

## 2023-03-16 RX ORDER — POTASSIUM CHLORIDE 750 MG/1
20 CAPSULE, EXTENDED RELEASE ORAL
Status: COMPLETED | OUTPATIENT
Start: 2023-03-16 | End: 2023-03-17

## 2023-03-16 RX ORDER — KETOROLAC TROMETHAMINE 30 MG/ML
30 INJECTION, SOLUTION INTRAMUSCULAR; INTRAVENOUS EVERY 6 HOURS PRN
Status: DISCONTINUED | OUTPATIENT
Start: 2023-03-16 | End: 2023-03-18 | Stop reason: HOSPADM

## 2023-03-16 RX ORDER — MAGNESIUM SULFATE HEPTAHYDRATE 40 MG/ML
4 INJECTION, SOLUTION INTRAVENOUS ONCE
Status: COMPLETED | OUTPATIENT
Start: 2023-03-16 | End: 2023-03-16

## 2023-03-16 RX ORDER — BUPRENORPHINE HYDROCHLORIDE 8 MG/1
8 TABLET SUBLINGUAL 3 TIMES DAILY
Status: DISCONTINUED | OUTPATIENT
Start: 2023-03-16 | End: 2023-03-18 | Stop reason: HOSPADM

## 2023-03-16 RX ORDER — PANTOPRAZOLE SODIUM 40 MG/1
40 TABLET, DELAYED RELEASE ORAL
Status: DISCONTINUED | OUTPATIENT
Start: 2023-03-17 | End: 2023-03-18 | Stop reason: HOSPADM

## 2023-03-16 RX ADMIN — KETOROLAC TROMETHAMINE 30 MG: 30 INJECTION, SOLUTION INTRAMUSCULAR; INTRAVENOUS at 16:54

## 2023-03-16 RX ADMIN — QUETIAPINE FUMARATE 300 MG: 200 TABLET ORAL at 20:23

## 2023-03-16 RX ADMIN — KETOROLAC TROMETHAMINE 15 MG: 15 INJECTION, SOLUTION INTRAMUSCULAR; INTRAVENOUS at 09:40

## 2023-03-16 RX ADMIN — GABAPENTIN 600 MG: 300 CAPSULE ORAL at 09:26

## 2023-03-16 RX ADMIN — SODIUM CHLORIDE, POTASSIUM CHLORIDE, SODIUM LACTATE AND CALCIUM CHLORIDE 100 ML/HR: 600; 310; 30; 20 INJECTION, SOLUTION INTRAVENOUS at 16:54

## 2023-03-16 RX ADMIN — METHYLPREDNISOLONE SODIUM SUCCINATE 60 MG: 125 INJECTION, POWDER, FOR SOLUTION INTRAMUSCULAR; INTRAVENOUS at 09:25

## 2023-03-16 RX ADMIN — KETOROLAC TROMETHAMINE 15 MG: 15 INJECTION, SOLUTION INTRAMUSCULAR; INTRAVENOUS at 01:43

## 2023-03-16 RX ADMIN — BUPRENORPHINE 8 MG: 8 TABLET SUBLINGUAL at 12:01

## 2023-03-16 RX ADMIN — BUPRENORPHINE 8 MG: 8 TABLET SUBLINGUAL at 16:48

## 2023-03-16 RX ADMIN — POTASSIUM CHLORIDE 20 MEQ: 10 CAPSULE, COATED, EXTENDED RELEASE ORAL at 20:23

## 2023-03-16 RX ADMIN — Medication 10 ML: at 20:24

## 2023-03-16 RX ADMIN — BUPRENORPHINE 8 MG: 8 TABLET SUBLINGUAL at 20:23

## 2023-03-16 RX ADMIN — SODIUM CHLORIDE, POTASSIUM CHLORIDE, SODIUM LACTATE AND CALCIUM CHLORIDE 100 ML/HR: 600; 310; 30; 20 INJECTION, SOLUTION INTRAVENOUS at 04:20

## 2023-03-16 RX ADMIN — Medication 10 ML: at 09:26

## 2023-03-16 RX ADMIN — MAGNESIUM SULFATE 4 G: 4 INJECTION INTRAVENOUS at 20:23

## 2023-03-16 RX ADMIN — FAMOTIDINE 20 MG: 20 TABLET ORAL at 09:26

## 2023-03-16 RX ADMIN — CLONIDINE HYDROCHLORIDE 0.2 MG: 0.2 TABLET ORAL at 20:23

## 2023-03-16 RX ADMIN — GABAPENTIN 600 MG: 300 CAPSULE ORAL at 20:23

## 2023-03-16 RX ADMIN — Medication 5 MG: at 20:23

## 2023-03-16 RX ADMIN — GABAPENTIN 600 MG: 300 CAPSULE ORAL at 13:13

## 2023-03-16 RX ADMIN — TRAZODONE HYDROCHLORIDE 300 MG: 100 TABLET ORAL at 20:23

## 2023-03-16 NOTE — CONSULTS
[facility]  Initial Inpatient Consult Note    Referring Provider: Dr. Liu    Reason for Consultation: abd pain    Subjective     History of present illness:    32 y.o. male with history of Crohn's disease with associated fistulas and bowel resection x 2 who presents with c/o abd pain x 1 week.  Pt denies nausea, vomiting and reports he was eating a regular diet prior to admission.  He describes diffuse abd pain.  3 - 5 BM daily, loose to watery, no blood.  Pt reports he has not been on medications for his Crohn's disease previously but is unsure why.  He is unsure if has been related to non-compliance.  He reports taking steroid tapers twice per year.  CT abd/pelvis with dilated fluid-filled loops of large/small bowel suggesting ileus and findings suggestive of aspiration PNA.    Past Medical History:  Past Medical History:   Diagnosis Date   • Crohn's disease (HCC)      Past Surgical History:  Past Surgical History:   Procedure Laterality Date   • COLON SURGERY     • SIGMOIDOSCOPY N/A 02/14/2023    Procedure: SIGMOIDOSCOPY FLEXIBLE;  Surgeon: Valentin Mason MD;  Location: [facility] ENDOSCOPY;  Service: Gastroenterology;  Laterality: N/A;  POOR PREP   • STOMACH SURGERY        Social History:   Social History     Tobacco Use   • Smoking status: Every Day     Packs/day: 1.00     Years: 10.00     Pack years: 10.00     Types: Cigarettes   • Smokeless tobacco: Never   Substance Use Topics   • Alcohol use: Not Currently      Family History:  Family History   Problem Relation Age of Onset   • Colon cancer Neg Hx        Home Meds:  Medications Prior to Admission   Medication Sig Dispense Refill Last Dose   • buprenorphine (SUBUTEX) 8 MG sublingual tablet SL tablet Place 22 mg under the tongue Daily.   Unknown   • cloNIDine (CATAPRES) 0.2 MG tablet Take 1 tablet by mouth 2 (Two) Times a Day As Needed.   Unknown   • dicyclomine (BENTYL) 20 MG tablet Take 1 tablet by mouth Every 6 (Six) Hours As  Needed (abdominal pain). 20 tablet 0 Unknown   • gabapentin (NEURONTIN) 600 MG tablet Take 1 tablet by mouth 3 (Three) Times a Day.   Unknown   • hydrOXYzine pamoate (VISTARIL) 50 MG capsule Take 1 capsule by mouth Daily.   Unknown   • melatonin 3 MG tablet Take 2 tablets by mouth Every Night.   Unknown   • OLANZapine zydis (zyPREXA) 20 MG disintegrating tablet Place 1 tablet on the tongue Every Night.   Unknown   • omeprazole (priLOSEC) 40 MG capsule Take 1 capsule by mouth Daily.   Unknown   • ondansetron ODT (ZOFRAN-ODT) 4 MG disintegrating tablet Place 1 tablet on the tongue Every 8 (Eight) Hours As Needed for Nausea or Vomiting. 15 tablet 0 Unknown   • PEG-KCl-NaCl-NaSulf-Na Asc-C (PLENVU) 140 g reconstituted solution solution Take 140 g by mouth Take As Directed. Follow Instructions Provided from Office. 140 each 0 Unknown   • QUEtiapine (SEROquel) 300 MG tablet Take 1 tablet by mouth Every Night.   Unknown   • traZODone (DESYREL) 100 MG tablet Take 3 tablets by mouth Every Night.   Unknown   • venlafaxine XR (EFFEXOR-XR) 75 MG 24 hr capsule Take 1 capsule by mouth Daily.   Unknown     Current Meds:   famotidine, 20 mg, Oral, BID  gabapentin, 600 mg, Oral, Q8H  melatonin, 5 mg, Oral, Nightly  QUEtiapine, 300 mg, Oral, Nightly  sodium chloride, 10 mL, Intravenous, Q12H  traZODone, 300 mg, Oral, Nightly      Allergies:  Allergies   Allergen Reactions   • Elemental Sulfur Swelling   • Naloxone Swelling   • Tylenol [Acetaminophen] Swelling     Review of Systems  Pertinent items are noted in HPI, all other systems reviewed and negative     Objective     Vital Signs  Temp:  [97.3 °F (36.3 °C)-98.4 °F (36.9 °C)] 97.5 °F (36.4 °C)  Heart Rate:  [71-98] 71  Resp:  [18] 18  BP: ()/(49-74) 92/49  Physical Exam:  General Appearance:    Alert, cooperative, in no acute distress   Head:    Normocephalic, without obvious abnormality, atraumatic   Eyes:          conjunctivae and sclerae normal, no icterus   Throat:   no  thrush, oral mucosa moist   Neck:   Supple, no adenopathy   Lungs:     Breathing unlabored    Heart:    No chest tenderness    Chest Wall:    No abnormalities observed   Abdomen:     Soft, nondistended, diffuse TTP   Extremities:   no edema, no redness   Skin:   No bruising or rash   Psychiatric:  normal mood and insight     Results Review:   I reviewed the patient's new clinical results.    Results from last 7 days   Lab Units 03/15/23  1431   WBC 10*3/mm3 6.00   HEMOGLOBIN g/dL 10.3*   HEMATOCRIT % 31.2*   PLATELETS 10*3/mm3 350     Results from last 7 days   Lab Units 03/15/23  1431   SODIUM mmol/L 140   POTASSIUM mmol/L 3.9   CHLORIDE mmol/L 107   CO2 mmol/L 27.8   BUN mg/dL 16   CREATININE mg/dL 0.90   CALCIUM mg/dL 7.4*   BILIRUBIN mg/dL 0.2   ALK PHOS U/L 117   ALT (SGPT) U/L 11   AST (SGOT) U/L 12   GLUCOSE mg/dL 84     Results from last 7 days   Lab Units 03/15/23  1431   INR  1.18*     Lab Results   Lab Value Date/Time    LIPASE 13 03/15/2023 1431    LIPASE 6 (L) 03/08/2023 1544    LIPASE 6 (L) 01/20/2023 1035       Radiology:  CT Abdomen Pelvis With Contrast   Final Result          Assessment & Plan   Patient Active Problem List   Diagnosis   • Diarrhea   • Crohn's disease of small intestine with complication (HCC)   • Generalized abdominal pain   • Abdominal pain       Assessment:  1. Crohn's disease of small/large intestine  2. Ileus    Plan:  · Will check ESR/CRP; C.diff negative.  Other stool studies pending.  · Will start solumedrol  · Will start full liquid diet; advance as tolerated  · At discharge, recommend prednisone 40 mg daily, taper by 10 mg every 5 days until 20 mg daily, then taper by 5 mg every 5 days until stop  · Keep pending outpatient endoscopy appointment with Dr. Mason as planned    Addendum:  Pt later went to cafeteria for food b/c did not want to follow liquid diet.    I discussed the patients findings and my recommendations with patient and primary care team.    Radha Pederson  MD [facility]

## 2023-03-16 NOTE — NURSING NOTE
Patient left floor against education from floor staff. Patient stated he wanted to go to the cafeteria but also that he was wanting to leave the floor. Went down the stairwell at the back of the floor, unable to communicate with patient to address his needs as he didn't stop at staffs advances to talk. He was already heading down the stairs as floor staff was trying to talk to him. Patient ignored staff. Security was called. . Eventually patient did come back to floor with escort from security, willingly and wanting to stay. Called MD Kincaid and Noe to let them know for primary RN. Patient was placed on a full liquid diet due to his diagnosis this admission on recommendation from GI ([facility]). However, MD Kincaid did put an order in after this event to advance diet as tolerated. Primary RN made aware.

## 2023-03-16 NOTE — PLAN OF CARE
Goal Outcome Evaluation:  Plan of Care Reviewed With: patient        Progress: no change  Outcome Evaluation: Pt c/o abd pain this shift, administered prn pain meds as ordered. Pt became irritable and agitated when pt's home meds were not scheduled, pt demanded to take his prn and scheduled nightly meds to help him sleep, reassured pt that those meds have to be ordered by and cleared from pharmacy prior to administering requested medications, pt verbally understood. Pt was able to sleep most of the night. On continuous IV fluids as ordered. Currently on strict NPO as ordered since midnight, GI MD will see pt in am to potentially have procedure done sometime today. No new issues or needs at this time.

## 2023-03-16 NOTE — SIGNIFICANT NOTE
23 1343   Coping/Psychosocial   Additional Documentation Spiritual Care (Group)   Spiritual Care   Use of Spiritual Resources spirituality for coping, indicated strong use of   Spiritual Care Source  initiative   Spiritual Care Follow-Up follow-up planned regularly for general support   Response to Spiritual Care receptive of support;engaged in conversation   Spiritual Care Interventions supportive conversation provided;other (see comments)  (pt's  recently passed away. She  on monday after she finished preaching, of a major heart attack)   Spiritual Care Visit Type initial   Spiritual Care Request coping/stress of illness support;spiritual/moral support   Receptivity to Spiritual Care visit welcomed

## 2023-03-16 NOTE — PROGRESS NOTES
[facility]   Hospitalist Progress Note  Date: 3/16/2023  Patient Name: Abhijeet Pitts  : 1990  MRN: [id number]  Date of admission: 3/15/2023      Subjective   Subjective     Chief Complaint: Follow-up abdominal pain nausea vomiting with diarrhea    Summary:Abhijeet Pitts is a 32 y.o. male past medical history significant for Crohn's disease with associated fistulas and bowel resection x2 in [place] presents with 1 week history of abdominal pain nausea vomiting diarrhea.  Pain is 10 out of 10 periumbilically radiating outward sharp in nature waxing and waning worse with p.o. intake.  Diarrhea described as watery.  Patient has been able to keep down water endorses good urination.  Patient denies fever denies chills.  Patient endorses sick contacts.  Patient endorses recent antibiotics in the past 2 weeks after visit to the ER earlier this month.  Patient presented to his gastroenterologist on the day of presentation requesting steroids.  Of note patient has developed lower extremity edema in the past couple weeks has history of low albumin.  Patient has not had stool studies performed or endoscopy due to poor prep.  Patient endorses lightheadedness and found to have BP 90s over 50s heart rate in the 90s.  Patient was sent to the emergency department for further evaluation and treatment.  In the emergency department patient was afebrile heart rate 80s to 90s blood pressure systolic 90s to 110s satting well on room air.  Abdomen very tender to palpation slightly distended though soft.  Labs negative for an albumin of 1.7 total protein of 4.6.  LFTs within normal limits.  CT abdomen pelvis demonstrated dilated fluid-filled loops of large and small bowel suggesting extensive ileus as well as patchy airspace consolidation in the right middle lobe as well as bronchial wall thickening and groundglass opacity in the lower lobes concerning for possible aspiration pneumonia.  Also noted to have fatty infiltration of the  liver, mesenteric adenopathy and avascular necrosis left femoral head.  ER attending discussed case with GI nurse practitioner who recommended stool studies.  Dr. Kincaid gastroenterology on-call contacted by GI nurse practitioner and reportedly agreed to consult.  Patient given IV fluids in the emergency department.  Hospitalist service contacted for admission for further evaluation and treatment of abdominal pain with nausea and vomiting concern for Crohn's flare versus infectious colitis with associated malnutrition.    Interval Followup: Patient seen and evaluated this afternoon.  Earlier this morning patient very frustrated with his diet status and walked down to the cafeteria to get himself more food.  Patient denies nausea vomiting endorses abdominal pain.  States that pain medications currently not helping.  Asking for increase in his pain medication.  Discussed risks of increasing his pain medication and patient understood and was amenable to a cautious increase.  Patient reports continued diarrhea nonbloody.  Patient tolerating a diet.  No other issues per nursing.    Review of Systems  Constitutional: Negative for fatigue and fever.   HENT: Negative for sore throat and trouble swallowing.    Eyes: Negative for pain and discharge.   Respiratory: Negative for cough and shortness of breath.    Cardiovascular: Negative for chest pain and palpitations.   Gastrointestinal: Positive for abdominal pain and diarrhea, negative nausea and vomiting.   Endocrine: Negative for cold intolerance and heat intolerance.   Genitourinary: Negative for difficulty urinating and dysuria.   Musculoskeletal: Negative for back pain and neck stiffness.   Skin: Negative for color change and rash.   Neurological: Negative for syncope and headaches.   Hematological: Negative for adenopathy.   Psychiatric/Behavioral: Negative for confusion and hallucinations.    Objective   Objective     Vitals:   Temp:  [97.3 °F (36.3 °C)-97.9 °F  (36.6 °C)] 97.9 °F (36.6 °C)  Heart Rate:  [65-80] 65  Resp:  [16-18] 18  BP: ()/(49-60) 103/54  Physical Exam   Gen. well-developed appearing stated age in no acute distress  HEENT: Normocephalic atraumatic moist membranes pupils equal round reactive light, no scleral icterus no conjunctival injection  Cardiovascular: regular rate and rhythm no murmurs rubs or gallops S1-S2, 2+ bilateral nonpitting lower extremity edema appreciated  Pulmonary: Clear to auscultation bilaterally no wheezes rales or rhonchi symmetric chest expansion, unlabored, no conversational dyspnea appreciated  Gastrointestinal: Soft diffusely tender slightly distended positive bowel sounds all 4 quadrants no rebound or guarding  Musculoskeletal: No clubbing cyanosis, warm and well-perfused, calves soft symmetric nontender bilaterally  Skin: Clean dry without rashes  Neuro: Cranial nerves II through XII intact grossly no sensorimotor deficits appreciated bilateral upper and lower extremities  Psych: Patient is calm cooperative and appropriate with exam not responding to internal stimuli  : No Vanessa catheter no bladder distention no suprapubic tenderness    Result Review    Result Review:  I have personally reviewed these results and agree with these findings:  [x]  Laboratory  LAB RESULTS:      Lab 03/16/23  1057 03/15/23  1431   WBC 4.42 6.00   HEMOGLOBIN 9.7* 10.3*   HEMATOCRIT 29.8* 31.2*   PLATELETS 348 350   NEUTROS ABS  --  4.05   IMMATURE GRANS (ABS)  --  0.02   LYMPHS ABS  --  1.19   MONOS ABS  --  0.65   EOS ABS  --  0.07   MCV 93.1 93.7   SED RATE 3  --    CRP 0.32  --    LACTATE  --  1.5   PROTIME  --  15.0*         Lab 03/16/23  1057 03/15/23  1431   SODIUM 139 140   POTASSIUM 3.6 3.9   CHLORIDE 108* 107   CO2 24.6 27.8   ANION GAP 6.4 5.2   BUN 18 16   CREATININE 0.89 0.90   EGFR 115.3 114.9   GLUCOSE 132* 84   CALCIUM 7.8* 7.4*   MAGNESIUM 1.3*  --    PHOSPHORUS  --  3.5         Lab 03/16/23  1057 03/15/23  1431   TOTAL  PROTEIN 4.4* 4.6*   ALBUMIN 1.7* 1.7*   GLOBULIN 2.7 2.9   ALT (SGPT) 12 11   AST (SGOT) 14 12   BILIRUBIN 0.3 0.2   ALK PHOS 106 117   LIPASE  --  13         Lab 03/15/23  1431   PROTIME 15.0*   INR 1.18*                 Brief Urine Lab Results  (Last result in the past 365 days)      Color   Clarity   Blood   Leuk Est   Nitrite   Protein   CREAT   Urine HCG        01/20/23 1450 Yellow   Clear   Negative   Negative   Negative   Negative               Microbiology Results (last 10 days)     Procedure Component Value - Date/Time    Clostridioides difficile Toxin - Stool, Per Rectum [[id number]] Collected: 03/15/23 2020    Lab Status: Final result Specimen: Stool from Per Rectum Updated: 03/15/23 2145    Narrative:      The following orders were created for panel order Clostridioides difficile Toxin - Stool, Per Rectum.  Procedure                               Abnormality         Status                     ---------                               -----------         ------                     Clostridioides difficile...[[id number]]                      Final result                 Please view results for these tests on the individual orders.    Clostridioides difficile Toxin, PCR - Stool, Per Rectum [[id number]] Collected: 03/15/23 2020    Lab Status: Final result Specimen: Stool from Per Rectum Updated: 03/15/23 2145     C. Difficile Toxins by PCR Negative     027 Toxin Presumptive Negative    Enteric Parasite Panel - Stool, Per Rectum [[id number]]  (Normal) Collected: 03/15/23 2020    Lab Status: Final result Specimen: Stool from Per Rectum Updated: 03/16/23 1102     Giardia lamblia Not Detected     Cryptosporidium Not Detected     Entamoeba histolytica Not Detected    Enteric Bacterial Panel - Stool, Per Rectum [[id number]]  (Normal) Collected: 03/15/23 2020    Lab Status: Final result Specimen: Stool from Per Rectum Updated: 03/16/23 1057     Salmonella Not Detected     Campylobacter Not Detected      Shigella/Enteroinvasive E. coli (EIEC) Not Detected     Shiga-like toxin-producing E. coli (STEC) stx1/stx2 Not Detected          [x]  Microbiology  [x]  Radiology  No radiology results for the last 7 days    []  EKG/Telemetry   []  Cardiology/Vascular   []  Pathology  []  Old records  [x]  Other:  Scheduled Meds:buprenorphine, 8 mg, Sublingual, TID  gabapentin, 600 mg, Oral, Q8H  melatonin, 5 mg, Oral, Nightly  methylPREDNISolone sodium succinate, 60 mg, Intravenous, Daily  [START ON 3/17/2023] pantoprazole, 40 mg, Oral, Q AM  QUEtiapine, 300 mg, Oral, Nightly  sodium chloride, 10 mL, Intravenous, Q12H  traZODone, 300 mg, Oral, Nightly      Continuous Infusions:lactated ringers, 100 mL/hr, Last Rate: 100 mL/hr (03/16/23 1063)      PRN Meds:.•  cloNIDine  •  ketorolac  •  ondansetron **OR** ondansetron  •  sodium chloride  •  sodium chloride  •  sodium chloride      Assessment & Plan   Assessment / Plan     Assessment/Plan:  Abdominal pain with nausea vomiting and diarrhea  Crohn's flare  Concern for aspiration pneumonitis  Concern for ileus  Fatty infiltration of the liver  Avascular necrosis of the left femoral head  Malnutrition        Patient admitted for further evaluation and treatment  Gastroenterology consulted thank you for your assistance  Stop IV fluids  Continue IV analgesics  Continue antiemetics as needed  Continue to monitor electrolytes and replace as needed  C. difficile toxin and bacterial panel negative  Lactoferrin positive  Start systemic steroids and taper per gastroenterology  Advance diet as tolerated  Start protein supplementation with meals  Further inpatient was recommendations pending clinical course     Discussed case with patient's nurse at the bedside as well as Dr. Kincaid gastroenterology attending.     Disposition: Patient likely be able to discharge home once tolerating a diet abdominal pain and diarrhea improve       DVT prophylaxis:  Mechanical DVT prophylaxis orders are  Detail Level: Simple present.    CODE STATUS:   Code Status (Patient has no pulse and is not breathing): CPR (Attempt to Resuscitate)  Medical Interventions (Patient has pulse or is breathing): Full Support

## 2023-03-16 NOTE — PLAN OF CARE
Goal Outcome Evaluation:  Plan of Care Reviewed With: patient        Progress: no change  Outcome Evaluation: Pt was A&Ox4 this shift. Also, pt was on room air maintaining stable oxygen saturation. Pt was medicated with PRN Toradol to help achieve an acceptable pain tolerance level. Pt diet was advanced to a regular diet. Pt tolerated well. Pt remained up ad [place] and tolerating well. Pt was educated about policy concerning pt safety and staying on floor and tobacco policy this shift. Pt agreed to follow policy and stay on floor and not to use tobacco products to include vape that is at bedside. Contious fluids were discontinued, as per order. Flex telemtry monitor remains in use, as per order. All other vital signs remained stable.

## 2023-03-16 NOTE — NURSING NOTE
"RN educated pt that MD Codi has ordered to advance diet as tolerated. RN went on to educated that a full liquid diet was placed due to diagnosis and the nausea/vomiting/diahrrhea pt was experiencing at time of admission.  Pt has requested a hamburger with fries. RN discussed that patient was able to tolerate full liquid diet with no complications at breakfast and RN is able to lower diet if not tolerated.     RN educated patient that due to safety that he can not leave floor. If patient was to leave floor and something was to happen pt would be without medical assistance for an undetermined period of time. So for that reason the policy is that patients need to remain on the floor for safety purposes. Pt states understanding and agrees to abide by this policy at this time. Pt states it will be easier now that he will be able to have \"real food.\"     Also, RN discussed with pt that he is able to keep his vape device with his person at bedside but is unable to use this device at bedside. That [facility] is a tobacco free facility and our policy as a facility prevents any staff, patient or visitor from using tobacco on premise. Pt agreed at this time to abide by this policy.   "

## 2023-03-17 LAB
ALBUMIN SERPL-MCNC: 1.8 G/DL (ref 3.5–5.2)
ANION GAP SERPL CALCULATED.3IONS-SCNC: 5.5 MMOL/L (ref 5–15)
BUN SERPL-MCNC: 16 MG/DL (ref 6–20)
BUN/CREAT SERPL: 20 (ref 7–25)
CALCIUM SPEC-SCNC: 7.7 MG/DL (ref 8.6–10.5)
CHLORIDE SERPL-SCNC: 107 MMOL/L (ref 98–107)
CO2 SERPL-SCNC: 24.5 MMOL/L (ref 22–29)
CREAT SERPL-MCNC: 0.8 MG/DL (ref 0.76–1.27)
EGFRCR SERPLBLD CKD-EPI 2021: 119.1 ML/MIN/1.73
GLUCOSE SERPL-MCNC: 108 MG/DL (ref 65–99)
MAGNESIUM SERPL-MCNC: 2 MG/DL (ref 1.6–2.6)
PHOSPHATE SERPL-MCNC: 2 MG/DL (ref 2.5–4.5)
POTASSIUM SERPL-SCNC: 4.6 MMOL/L (ref 3.5–5.2)
SODIUM SERPL-SCNC: 137 MMOL/L (ref 136–145)

## 2023-03-17 PROCEDURE — 83735 ASSAY OF MAGNESIUM: CPT | Performed by: FAMILY MEDICINE

## 2023-03-17 PROCEDURE — 99232 SBSQ HOSP IP/OBS MODERATE 35: CPT | Performed by: FAMILY MEDICINE

## 2023-03-17 PROCEDURE — 25010000002 KETOROLAC TROMETHAMINE PER 15 MG: Performed by: FAMILY MEDICINE

## 2023-03-17 PROCEDURE — 25010000002 ONDANSETRON PER 1 MG: Performed by: FAMILY MEDICINE

## 2023-03-17 PROCEDURE — 25010000002 METHYLPREDNISOLONE PER 125 MG: Performed by: INTERNAL MEDICINE

## 2023-03-17 PROCEDURE — 80069 RENAL FUNCTION PANEL: CPT | Performed by: FAMILY MEDICINE

## 2023-03-17 RX ORDER — GUAIFENESIN 200 MG/10ML
400 LIQUID ORAL EVERY 4 HOURS PRN
Status: DISCONTINUED | OUTPATIENT
Start: 2023-03-17 | End: 2023-03-18 | Stop reason: HOSPADM

## 2023-03-17 RX ADMIN — GABAPENTIN 600 MG: 300 CAPSULE ORAL at 14:59

## 2023-03-17 RX ADMIN — Medication 5 MG: at 21:33

## 2023-03-17 RX ADMIN — KETOROLAC TROMETHAMINE 30 MG: 30 INJECTION, SOLUTION INTRAMUSCULAR; INTRAVENOUS at 09:12

## 2023-03-17 RX ADMIN — BUPRENORPHINE 8 MG: 8 TABLET SUBLINGUAL at 15:07

## 2023-03-17 RX ADMIN — KETOROLAC TROMETHAMINE 30 MG: 30 INJECTION, SOLUTION INTRAMUSCULAR; INTRAVENOUS at 21:32

## 2023-03-17 RX ADMIN — BUPRENORPHINE 8 MG: 8 TABLET SUBLINGUAL at 21:41

## 2023-03-17 RX ADMIN — PANTOPRAZOLE SODIUM 40 MG: 40 TABLET, DELAYED RELEASE ORAL at 06:40

## 2023-03-17 RX ADMIN — CLONIDINE HYDROCHLORIDE 0.2 MG: 0.2 TABLET ORAL at 22:26

## 2023-03-17 RX ADMIN — GUAIFENESIN 400 MG: 200 SOLUTION ORAL at 16:55

## 2023-03-17 RX ADMIN — Medication 10 ML: at 09:10

## 2023-03-17 RX ADMIN — GABAPENTIN 600 MG: 300 CAPSULE ORAL at 06:39

## 2023-03-17 RX ADMIN — KETOROLAC TROMETHAMINE 30 MG: 30 INJECTION, SOLUTION INTRAMUSCULAR; INTRAVENOUS at 00:26

## 2023-03-17 RX ADMIN — ONDANSETRON 4 MG: 2 INJECTION INTRAMUSCULAR; INTRAVENOUS at 09:14

## 2023-03-17 RX ADMIN — POTASSIUM CHLORIDE 20 MEQ: 10 CAPSULE, COATED, EXTENDED RELEASE ORAL at 09:09

## 2023-03-17 RX ADMIN — POTASSIUM & SODIUM PHOSPHATES POWDER PACK 280-160-250 MG 1 PACKET: 280-160-250 PACK at 16:55

## 2023-03-17 RX ADMIN — BUPRENORPHINE 8 MG: 8 TABLET SUBLINGUAL at 09:09

## 2023-03-17 RX ADMIN — TRAZODONE HYDROCHLORIDE 300 MG: 100 TABLET ORAL at 21:33

## 2023-03-17 RX ADMIN — POTASSIUM & SODIUM PHOSPHATES POWDER PACK 280-160-250 MG 1 PACKET: 280-160-250 PACK at 21:32

## 2023-03-17 RX ADMIN — Medication 10 ML: at 21:33

## 2023-03-17 RX ADMIN — GABAPENTIN 600 MG: 300 CAPSULE ORAL at 21:32

## 2023-03-17 RX ADMIN — METHYLPREDNISOLONE SODIUM SUCCINATE 60 MG: 125 INJECTION, POWDER, FOR SOLUTION INTRAMUSCULAR; INTRAVENOUS at 09:09

## 2023-03-17 RX ADMIN — QUETIAPINE FUMARATE 300 MG: 200 TABLET ORAL at 21:32

## 2023-03-17 NOTE — PROGRESS NOTES
[facility]   Hospitalist Progress Note  Date: 3/17/2023  Patient Name: Abhijeet Pitts  : 1990  MRN: [id number]  Date of admission: 3/15/2023      Subjective   Subjective     Chief Complaint: Follow-up abdominal pain nausea vomiting with diarrhea    Summary:Abhijeet Pitts is a 32 y.o. male past medical history significant for Crohn's disease with associated fistulas and bowel resection x2 in [place] presents with 1 week history of abdominal pain nausea vomiting diarrhea.  Pain is 10 out of 10 periumbilically radiating outward sharp in nature waxing and waning worse with p.o. intake.  Diarrhea described as watery.  Patient has been able to keep down water endorses good urination.  Patient denies fever denies chills.  Patient endorses sick contacts.  Patient endorses recent antibiotics in the past 2 weeks after visit to the ER earlier this month.  Patient presented to his gastroenterologist on the day of presentation requesting steroids.  Of note patient has developed lower extremity edema in the past couple weeks has history of low albumin.  Patient has not had stool studies performed or endoscopy due to poor prep.  Patient endorses lightheadedness and found to have BP 90s over 50s heart rate in the 90s.  Patient was sent to the emergency department for further evaluation and treatment.  In the emergency department patient was afebrile heart rate 80s to 90s blood pressure systolic 90s to 110s satting well on room air.  Abdomen very tender to palpation slightly distended though soft.  Labs negative for an albumin of 1.7 total protein of 4.6.  LFTs within normal limits.  CT abdomen pelvis demonstrated dilated fluid-filled loops of large and small bowel suggesting extensive ileus as well as patchy airspace consolidation in the right middle lobe as well as bronchial wall thickening and groundglass opacity in the lower lobes concerning for possible aspiration pneumonia.  Also noted to have fatty infiltration of the  liver, mesenteric adenopathy and avascular necrosis left femoral head.  ER attending discussed case with GI nurse practitioner who recommended stool studies.  Dr. Kincaid gastroenterology on-call contacted by GI nurse practitioner and reportedly agreed to consult.  Patient given IV fluids in the emergency department.  Hospitalist service contacted for admission for further evaluation and treatment of abdominal pain with nausea and vomiting concern for Crohn's flare versus infectious colitis with associated malnutrition.    Interval Followup: Patient seen and evaluated this afternoon.  Patient denies nausea vomiting endorses abdominal pain though improved.  Patient reports continued diarrhea nonbloody slowly improving.  Patient tolerating a diet.  No other issues per nursing.    Review of Systems  Constitutional: Negative for fatigue and fever.   HENT: Negative for sore throat and trouble swallowing.    Eyes: Negative for pain and discharge.   Respiratory: Negative for cough and shortness of breath.    Cardiovascular: Negative for chest pain and palpitations.   Gastrointestinal: Positive for abdominal pain and diarrhea, negative nausea and vomiting.   Endocrine: Negative for cold intolerance and heat intolerance.   Genitourinary: Negative for difficulty urinating and dysuria.   Musculoskeletal: Negative for back pain and neck stiffness.   Skin: Negative for color change and rash.   Neurological: Negative for syncope and headaches.   Hematological: Negative for adenopathy.   Psychiatric/Behavioral: Negative for confusion and hallucinations.    Objective   Objective     Vitals:   Temp:  [97.2 °F (36.2 °C)-98.6 °F (37 °C)] 98.1 °F (36.7 °C)  Heart Rate:  [66-91] 78  Resp:  [12-18] 14  BP: ()/(47-65) 113/59  Physical Exam   Gen. well-developed appearing stated age in no acute distress  HEENT: Normocephalic atraumatic moist membranes pupils equal round reactive light, no scleral icterus no conjunctival  injection  Cardiovascular: regular rate and rhythm no murmurs rubs or gallops S1-S2, 2+ bilateral nonpitting lower extremity edema appreciated  Pulmonary: Clear to auscultation bilaterally no wheezes rales or rhonchi symmetric chest expansion, unlabored, no conversational dyspnea appreciated  Gastrointestinal: Soft diffusely tender slightly distended positive bowel sounds all 4 quadrants no rebound or guarding  Musculoskeletal: No clubbing cyanosis, warm and well-perfused, calves soft symmetric nontender bilaterally  Skin: Clean dry without rashes  Neuro: Cranial nerves II through XII intact grossly no sensorimotor deficits appreciated bilateral upper and lower extremities  Psych: Patient is calm cooperative and appropriate with exam not responding to internal stimuli  : No Vanessa catheter no bladder distention no suprapubic tenderness    Result Review    Result Review:  I have personally reviewed these results and agree with these findings:  [x]  Laboratory  LAB RESULTS:      Lab 03/16/23  1057 03/15/23  1431   WBC 4.42 6.00   HEMOGLOBIN 9.7* 10.3*   HEMATOCRIT 29.8* 31.2*   PLATELETS 348 350   NEUTROS ABS  --  4.05   IMMATURE GRANS (ABS)  --  0.02   LYMPHS ABS  --  1.19   MONOS ABS  --  0.65   EOS ABS  --  0.07   MCV 93.1 93.7   SED RATE 3  --    CRP 0.32  --    LACTATE  --  1.5   PROTIME  --  15.0*         Lab 03/17/23  1314 03/16/23  1057 03/15/23  1431   SODIUM 137 139 140   POTASSIUM 4.6 3.6 3.9   CHLORIDE 107 108* 107   CO2 24.5 24.6 27.8   ANION GAP 5.5 6.4 5.2   BUN 16 18 16   CREATININE 0.80 0.89 0.90   EGFR 119.1 115.3 114.9   GLUCOSE 108* 132* 84   CALCIUM 7.7* 7.8* 7.4*   MAGNESIUM 2.0 1.3*  --    PHOSPHORUS 2.0*  --  3.5         Lab 03/17/23  1314 03/16/23  1057 03/15/23  1431   TOTAL PROTEIN  --  4.4* 4.6*   ALBUMIN 1.8* 1.7* 1.7*   GLOBULIN  --  2.7 2.9   ALT (SGPT)  --  12 11   AST (SGOT)  --  14 12   BILIRUBIN  --  0.3 0.2   ALK PHOS  --  106 117   LIPASE  --   --  13         Lab 03/15/23  4493    PROTIME 15.0*   INR 1.18*                 Brief Urine Lab Results  (Last result in the past 365 days)      Color   Clarity   Blood   Leuk Est   Nitrite   Protein   CREAT   Urine HCG        01/20/23 1450 Yellow   Clear   Negative   Negative   Negative   Negative               Microbiology Results (last 10 days)     Procedure Component Value - Date/Time    Clostridioides difficile Toxin - Stool, Per Rectum [[id number]] Collected: 03/15/23 2020    Lab Status: Final result Specimen: Stool from Per Rectum Updated: 03/15/23 2145    Narrative:      The following orders were created for panel order Clostridioides difficile Toxin - Stool, Per Rectum.  Procedure                               Abnormality         Status                     ---------                               -----------         ------                     Clostridioides difficile...[[id number]]                      Final result                 Please view results for these tests on the individual orders.    Clostridioides difficile Toxin, PCR - Stool, Per Rectum [[id number]] Collected: 03/15/23 2020    Lab Status: Final result Specimen: Stool from Per Rectum Updated: 03/15/23 2145     C. Difficile Toxins by PCR Negative     027 Toxin Presumptive Negative    Enteric Parasite Panel - Stool, Per Rectum [[id number]]  (Normal) Collected: 03/15/23 2020    Lab Status: Final result Specimen: Stool from Per Rectum Updated: 03/16/23 1102     Giardia lamblia Not Detected     Cryptosporidium Not Detected     Entamoeba histolytica Not Detected    Enteric Bacterial Panel - Stool, Per Rectum [[id number]]  (Normal) Collected: 03/15/23 2020    Lab Status: Final result Specimen: Stool from Per Rectum Updated: 03/16/23 1057     Salmonella Not Detected     Campylobacter Not Detected     Shigella/Enteroinvasive E. coli (EIEC) Not Detected     Shiga-like toxin-producing E. coli (STEC) stx1/stx2 Not Detected          [x]  Microbiology  [x]  Radiology  PROCEDURE: CT ABDOMEN PELVIS W  CONTRAST       COMPARISON: [facility], CT, CT ABDOMEN PELVIS W CONTRAST, 1/20/2023, 12:22.       INDICATIONS: mid abdominal pain, nausea       PROTOCOL:   Standard imaging protocol performed       RADIATION:   DLP: 443.5mGy*cm     Automated exposure control was utilized to minimize radiation dose.   CONTRAST: 100cc Isovue 370 I.V.       TECHNIQUE: Axial images of the abdomen and pelvis with intravenous contrast.       ABDOMEN:  There is patchy airspace consolidation in the right middle lobe.  There are areas of   ground-glass opacity and bronchial wall thickening in the lower lobes.  There is fatty infiltration   of the liver.  The spleen, pancreas, adrenal glands and kidneys are normal.  There are no   inflammatory changes around the gallbladder.       PELVIS:  There are dilated fluid-filled loops of large and small bowel.  No evidence of   pneumoperitoneum.  No evidence of abscess.  There is mesenteric adenopathy.  The abdominal aorta   has a normal caliber.  There is avascular necrosis of the left femoral head without evidence of   significant collapse.  There is a large amount of stool in the rectum.  The urinary bladder is   normal.  There appears to be surgical suture line in the region of the distal ileum.       IMPRESSION:       1. Dilated fluid-filled loops of large and small bowel suggesting extensive ileus.       2. New patchy airspace consolidation in the right middle lobe.  New areas of ground-glass opacity   and bronchial wall thickening in the lower lobes.  Suggest correlation with any history of   aspiration pneumonia       3. Fatty infiltration of the liver.       4. Mesenteric adenopathy likely related to the patient's history of inflammatory bowel disease.         5. Avascular necrosis of the left femoral head.         FREDY MARINA MD         Electronically Signed and Approved By: FREDY MARINA MD on 3/15/2023 at 16:04          []  EKG/Telemetry   []  Cardiology/Vascular   []   Pathology  []  Old records  [x]  Other:  Scheduled Meds:buprenorphine, 8 mg, Sublingual, TID  gabapentin, 600 mg, Oral, Q8H  melatonin, 5 mg, Oral, Nightly  methylPREDNISolone sodium succinate, 60 mg, Intravenous, Daily  pantoprazole, 40 mg, Oral, Q AM  potassium & sodium phosphates, 1 packet, Oral, 4x Daily AC & at Bedtime  QUEtiapine, 300 mg, Oral, Nightly  sodium chloride, 10 mL, Intravenous, Q12H  traZODone, 300 mg, Oral, Nightly      Continuous Infusions:   PRN Meds:.•  cloNIDine  •  guaifenesin  •  ketorolac  •  ondansetron **OR** ondansetron  •  sodium chloride  •  sodium chloride  •  sodium chloride      Assessment & Plan   Assessment / Plan     Assessment/Plan:  Abdominal pain with nausea vomiting and diarrhea  Crohn's flare  Concern for aspiration pneumonitis  Concern for ileus  Fatty infiltration of the liver  Avascular necrosis of the left femoral head  Malnutrition  Hypophosphatemia  Hypomagnesemia        Patient admitted for further evaluation and treatment  Gastroenterology consulted thank you for your assistance  Continue IV analgesics  Continue antiemetics as needed  Continue to monitor electrolytes and replace as needed  C. difficile toxin and bacterial panel negative  Lactoferrin positive  Continue systemic steroids and taper per gastroenterology  Advance diet as tolerated  Continue protein supplementation with meals  Further inpatient was recommendations pending clinical course     Discussed case with patient's nurse at the bedside.     Disposition: Patient likely be able to discharge home once tolerating a diet abdominal pain and diarrhea improve       DVT prophylaxis:  Mechanical DVT prophylaxis orders are present.    CODE STATUS:   Code Status (Patient has no pulse and is not breathing): CPR (Attempt to Resuscitate)  Medical Interventions (Patient has pulse or is breathing): Full Support

## 2023-03-17 NOTE — PLAN OF CARE
Goal Outcome Evaluation:  Plan of Care Reviewed With: patient        Progress: no change  Outcome Evaluation: Pt c/o pain/discomfort this shift, administered prn pain meds as ordered. Pt was more cooperative last night. No new issues or needs at this time.

## 2023-03-17 NOTE — PLAN OF CARE
Goal Outcome Evaluation:  Plan of Care Reviewed With: patient        Progress: no change  Outcome Evaluation: Pt was A&Ox4 this shift and demanding. Also, pt remained on room air maintaining stable oxygen saturation. Pt was medicated with PRN Toradol to help achieve an acceptable pain tolerance level. Also, pt was medicated with PRN Robutussin to help relieve cough that pt states is present. Pt was medicated with PRN Zofran to help relieve nausea. Pt was frequently reminded about the need for a urine sample, as ordered, and was not provided this shift. All other vital signs remained stable

## 2023-03-18 ENCOUNTER — READMISSION MANAGEMENT (OUTPATIENT)
Dept: CALL CENTER | Facility: HOSPITAL | Age: 33
End: 2023-03-18
Payer: MEDICARE

## 2023-03-18 VITALS
OXYGEN SATURATION: 98 % | DIASTOLIC BLOOD PRESSURE: 50 MMHG | SYSTOLIC BLOOD PRESSURE: 102 MMHG | BODY MASS INDEX: 28.18 KG/M2 | RESPIRATION RATE: 18 BRPM | WEIGHT: 190.26 LBS | TEMPERATURE: 97.5 F | HEART RATE: 57 BPM | HEIGHT: 69 IN

## 2023-03-18 PROCEDURE — 25010000002 KETOROLAC TROMETHAMINE PER 15 MG: Performed by: FAMILY MEDICINE

## 2023-03-18 PROCEDURE — 25010000002 METHYLPREDNISOLONE PER 125 MG: Performed by: INTERNAL MEDICINE

## 2023-03-18 PROCEDURE — 99239 HOSP IP/OBS DSCHRG MGMT >30: CPT | Performed by: FAMILY MEDICINE

## 2023-03-18 RX ORDER — PREDNISONE 1 MG/1
TABLET ORAL
Qty: 120 TABLET | Refills: 0 | Status: SHIPPED | OUTPATIENT
Start: 2023-03-18 | End: 2023-04-17

## 2023-03-18 RX ADMIN — GABAPENTIN 600 MG: 300 CAPSULE ORAL at 06:33

## 2023-03-18 RX ADMIN — METHYLPREDNISOLONE SODIUM SUCCINATE 60 MG: 125 INJECTION, POWDER, FOR SOLUTION INTRAMUSCULAR; INTRAVENOUS at 08:12

## 2023-03-18 RX ADMIN — POTASSIUM & SODIUM PHOSPHATES POWDER PACK 280-160-250 MG 1 PACKET: 280-160-250 PACK at 06:33

## 2023-03-18 RX ADMIN — KETOROLAC TROMETHAMINE 30 MG: 30 INJECTION, SOLUTION INTRAMUSCULAR; INTRAVENOUS at 08:19

## 2023-03-18 RX ADMIN — BUPRENORPHINE 8 MG: 8 TABLET SUBLINGUAL at 08:12

## 2023-03-18 RX ADMIN — Medication 10 ML: at 08:12

## 2023-03-18 RX ADMIN — PANTOPRAZOLE SODIUM 40 MG: 40 TABLET, DELAYED RELEASE ORAL at 06:33

## 2023-03-18 NOTE — DISCHARGE SUMMARY
[facility]         HOSPITALIST  DISCHARGE SUMMARY    Patient Name: Abhijeet Pitts  : 1990  MRN: [id number]    Date of Admission: 3/15/2023  Date of Discharge: 3/18/2023  Primary Care Physician: Provider, No Known    Consults     Date and Time Order Name Status Description    3/15/2023  4:39 PM Inpatient Hospitalist Consult      3/15/2023  4:39 PM Inpatient Gastroenterology Consult Completed           Active and Resolved Hospital Problems:  Abdominal pain with nausea vomiting and diarrhea  Crohn's flare  Concern for aspiration pneumonitis  Concern for ileus  Fatty infiltration of the liver  Avascular necrosis of the left femoral head  Malnutrition  Hypophosphatemia  Hypomagnesemia  Active Hospital Problems    Diagnosis POA   • **Abdominal pain [R10.9] Yes      Resolved Hospital Problems   No resolved problems to display.       Hospital Course     Hospital Course:  Abhijeet Pitts is a 32 y.o. male past medical history significant for Crohn's disease with associated fistulas and bowel resection x2 in [place] presents with 1 week history of abdominal pain nausea vomiting diarrhea.  Pain is 10 out of 10 periumbilically radiating outward sharp in nature waxing and waning worse with p.o. intake.  Diarrhea described as watery.  Patient has been able to keep down water endorses good urination.  Patient denies fever denies chills.  Patient endorses sick contacts.  Patient endorses recent antibiotics in the past 2 weeks after visit to the ER earlier this month.  Patient presented to his gastroenterologist on the day of presentation requesting steroids.  Of note patient has developed lower extremity edema in the past couple weeks has history of low albumin.  Patient has not had stool studies performed or endoscopy due to poor prep.  Patient endorses lightheadedness and found to have BP 90s over 50s heart rate in the 90s.  Patient was sent to the emergency department for further evaluation and treatment.   In the emergency department patient was afebrile heart rate 80s to 90s blood pressure systolic 90s to 110s satting well on room air.  Abdomen very tender to palpation slightly distended though soft.  Labs negative for an albumin of 1.7 total protein of 4.6.  LFTs within normal limits.  CT abdomen pelvis demonstrated dilated fluid-filled loops of large and small bowel suggesting extensive ileus as well as patchy airspace consolidation in the right middle lobe as well as bronchial wall thickening and groundglass opacity in the lower lobes concerning for possible aspiration pneumonia.  Also noted to have fatty infiltration of the liver, mesenteric adenopathy and avascular necrosis left femoral head.  ER attending discussed case with GI nurse practitioner who recommended stool studies.  Dr. Kincaid gastroenterology on-call contacted by GI nurse practitioner and reportedly agreed to consult.  Patient given IV fluids in the emergency department.  Hospitalist service contacted for admission for further evaluation and treatment of abdominal pain with nausea and vomiting concern for Crohn's flare versus infectious colitis with associated malnutrition. C. difficile toxin and bacterial stool panel negative.  Lactoferrin positive.  Started on systemic steroids.  Abdominal pain diarrhea nausea and vomiting improved.  Patient tolerating a diet diet advanced.  Electrolytes replaced as needed.  Patient seen and evaluated on day of discharge and thought stable for discharge home to complete a long taper of prednisone and follow-up with his primary care provider and gastroenterology.        DISCHARGE Follow Up Recommendations for labs and diagnostics: As above      Day of Discharge     Vital Signs:  Temp:  [97.3 °F (36.3 °C)-98.6 °F (37 °C)] 97.5 °F (36.4 °C)  Heart Rate:  [57-91] 57  Resp:  [14-20] 18  BP: (101-118)/(47-67) 102/50  Physical Exam:   Gen. well-developed appearing stated age in no acute distress  HEENT: Normocephalic  atraumatic moist membranes pupils equal round reactive light, no scleral icterus no conjunctival injection  Cardiovascular: regular rate and rhythm no murmurs rubs or gallops S1-S2, 2+ bilateral nonpitting lower extremity edema appreciated  Pulmonary: Clear to auscultation bilaterally no wheezes rales or rhonchi symmetric chest expansion, unlabored, no conversational dyspnea appreciated  Gastrointestinal: Soft nontender nondistended positive bowel sounds all 4 quadrants no rebound or guarding  Musculoskeletal: No clubbing cyanosis, warm and well-perfused, calves soft symmetric nontender bilaterally  Skin: Clean dry without rashes  Neuro: Cranial nerves II through XII intact grossly no sensorimotor deficits appreciated bilateral upper and lower extremities  Psych: Patient is calm cooperative and appropriate with exam not responding to internal stimuli  : No Vanessa catheter no bladder distention no suprapubic tenderness      Discharge Details        Discharge Medications      New Medications      Instructions Start Date   PEG-KCl-NaCl-NaSulf-Na Asc-C 140 g reconstituted solution solution  Commonly known as: PLENVU   140 g, Oral, Take As Directed, Follow Instructions Provided from Office.      predniSONE 5 MG tablet  Commonly known as: DELTASONE   Take 8 tablets by mouth Daily for 5 days, THEN 6 tablets Daily for 5 days, THEN 4 tablets Daily for 5 days, THEN 3 tablets Daily for 5 days, THEN 2 tablets Daily for 5 days, THEN 1 tablet Daily for 5 days.   Start Date: March 18, 2023        Continue These Medications      Instructions Start Date   buprenorphine 8 MG sublingual tablet SL tablet  Commonly known as: SUBUTEX   22 mg, Sublingual, Daily      cloNIDine 0.2 MG tablet  Commonly known as: CATAPRES   0.2 mg, Oral, 2 Times Daily PRN      dicyclomine 20 MG tablet  Commonly known as: BENTYL   20 mg, Oral, Every 6 Hours PRN      gabapentin 600 MG tablet  Commonly known as: NEURONTIN   600 mg, Oral, 3 Times Daily       hydrOXYzine pamoate 50 MG capsule  Commonly known as: VISTARIL   50 mg, Oral, Daily      melatonin 3 MG tablet   6 mg, Oral, Nightly      OLANZapine zydis 20 MG disintegrating tablet  Commonly known as: zyPREXA   20 mg, Translingual, Nightly      omeprazole 40 MG capsule  Commonly known as: priLOSEC   40 mg, Oral, Daily      ondansetron ODT 4 MG disintegrating tablet  Commonly known as: ZOFRAN-ODT   4 mg, Translingual, Every 8 Hours PRN      QUEtiapine 300 MG tablet  Commonly known as: SEROquel   300 mg, Oral, Nightly      traZODone 100 MG tablet  Commonly known as: DESYREL   300 mg, Oral, Nightly      venlafaxine XR 75 MG 24 hr capsule  Commonly known as: EFFEXOR-XR   75 mg, Oral, Daily             Allergies   Allergen Reactions   • Elemental Sulfur Swelling   • Fish-Derived Products Unknown - Low Severity   • Naloxone Swelling   • Shellfish-Derived Products Unknown - Low Severity   • Tylenol [Acetaminophen] Swelling       Discharge Disposition:  Home or Self Care    Diet:  Hospital:  Diet Order   Procedures   • Diet: Regular/House Diet; Texture: Regular Texture (IDDSI 7); Fluid Consistency: Thin (IDDSI 0)       Discharge Activity:   Activity Instructions     Gradually Increase Activity Until at Pre-Hospitalization Level            CODE STATUS:  Code Status and Medical Interventions:   Ordered at: 03/15/23 1639     Code Status (Patient has no pulse and is not breathing):    CPR (Attempt to Resuscitate)     Medical Interventions (Patient has pulse or is breathing):    Full Support         Future Appointments   Date Time Provider Department Center   5/17/2023  9:45 AM Radhika Wilkins APRN [facility]       Additional Instructions for the Follow-ups that You Need to Schedule     Discharge Follow-up with PCP   As directed       Currently Documented PCP:    Provider, No Known    PCP Phone Number:    None     Follow Up Details: Hospital discharge follow-up 1 to 2 weeks Crohn's flare         Discharge  Follow-up with Specialty: Gastroenterology Dr. Mason's office; 2 Weeks   As directed      Specialty: Gastroenterology Dr. Mason's office    Follow Up: 2 Weeks    Follow Up Details: Hospital discharge follow-up Crohn's flare, malnutrition               Pertinent  and/or Most Recent Results     PROCEDURES:   None    LAB RESULTS:      Lab 03/16/23  1057 03/15/23  1431   WBC 4.42 6.00   HEMOGLOBIN 9.7* 10.3*   HEMATOCRIT 29.8* 31.2*   PLATELETS 348 350   NEUTROS ABS  --  4.05   IMMATURE GRANS (ABS)  --  0.02   LYMPHS ABS  --  1.19   MONOS ABS  --  0.65   EOS ABS  --  0.07   MCV 93.1 93.7   SED RATE 3  --    CRP 0.32  --    LACTATE  --  1.5   PROTIME  --  15.0*         Lab 03/17/23  1314 03/16/23  1057 03/15/23  1431   SODIUM 137 139 140   POTASSIUM 4.6 3.6 3.9   CHLORIDE 107 108* 107   CO2 24.5 24.6 27.8   ANION GAP 5.5 6.4 5.2   BUN 16 18 16   CREATININE 0.80 0.89 0.90   EGFR 119.1 115.3 114.9   GLUCOSE 108* 132* 84   CALCIUM 7.7* 7.8* 7.4*   MAGNESIUM 2.0 1.3*  --    PHOSPHORUS 2.0*  --  3.5         Lab 03/17/23  1314 03/16/23  1057 03/15/23  1431   TOTAL PROTEIN  --  4.4* 4.6*   ALBUMIN 1.8* 1.7* 1.7*   GLOBULIN  --  2.7 2.9   ALT (SGPT)  --  12 11   AST (SGOT)  --  14 12   BILIRUBIN  --  0.3 0.2   ALK PHOS  --  106 117   LIPASE  --   --  13         Lab 03/15/23  1431   PROTIME 15.0*   INR 1.18*                 Brief Urine Lab Results  (Last result in the past 365 days)      Color   Clarity   Blood   Leuk Est   Nitrite   Protein   CREAT   Urine HCG        01/20/23 1450 Yellow   Clear   Negative   Negative   Negative   Negative               Microbiology Results (last 10 days)     Procedure Component Value - Date/Time    Clostridioides difficile Toxin - Stool, Per Rectum [[id number]] Collected: 03/15/23 2020    Lab Status: Final result Specimen: Stool from Per Rectum Updated: 03/15/23 2145    Narrative:      The following orders were created for panel order Clostridioides difficile Toxin - Stool, Per  Rectum.  Procedure                               Abnormality         Status                     ---------                               -----------         ------                     Clostridioides difficile...[[id number]]                      Final result                 Please view results for these tests on the individual orders.    Clostridioides difficile Toxin, PCR - Stool, Per Rectum [[id number]] Collected: 03/15/23 2020    Lab Status: Final result Specimen: Stool from Per Rectum Updated: 03/15/23 2145     C. Difficile Toxins by PCR Negative     027 Toxin Presumptive Negative    Enteric Parasite Panel - Stool, Per Rectum [[id number]]  (Normal) Collected: 03/15/23 2020    Lab Status: Final result Specimen: Stool from Per Rectum Updated: 03/16/23 1102     Giardia lamblia Not Detected     Cryptosporidium Not Detected     Entamoeba histolytica Not Detected    Enteric Bacterial Panel - Stool, Per Rectum [[id number]]  (Normal) Collected: 03/15/23 2020    Lab Status: Final result Specimen: Stool from Per Rectum Updated: 03/16/23 1057     Salmonella Not Detected     Campylobacter Not Detected     Shigella/Enteroinvasive E. coli (EIEC) Not Detected     Shiga-like toxin-producing E. coli (STEC) stx1/stx2 Not Detected          XR Chest 1 View    Result Date: 3/8/2023  Impression:   Subsegmental atelectasis is seen at the left lung base.       CHELSIE ALEJANDRO MD       Electronically Signed and Approved By: CHELSIE ALEJANDRO MD on 3/08/2023 at 16:13                           Labs Pending at Discharge:        Time spent on Discharge including face to face service: Greater than 35 minutes    Electronically signed by Syd Liu MD, 03/18/23, 9:50 AM EDT.

## 2023-03-18 NOTE — PLAN OF CARE
Goal Outcome Evaluation:  Plan of Care Reviewed With: patient        Progress: no change  Outcome Evaluation: Pt educated on hospital policy on patients leaving the unit while under care. Charge nurse followed up. Pt verbalizes understanding. VS WDL. Denies any pain or nausea at this time. Pt resting between care. Will continue to monitor.

## 2023-03-19 NOTE — PROGRESS NOTES
"Enter Query Response Below      Query Response: Yes, malnutrition was supported as the patient has been nauseous and vomiting for several days not able to keep down any solids and very little water.  Patient also noted to have significant bilateral lower extremity nonpitting edema thought due to patient's low albumin due to long-term poor nutrient absorption due to poorly controlled Crohn's disease.             If applicable, please update the problem list.   Patient: Abhijeet Pitts        : 1990  Account: [id number]           Admit Date: 3/15/2023        How to Respond to this query:       a. Click New Note     b. Answer query within the yellow box.                c. Update the Problem List, if applicable.      If you have any questions about this query contact me at:  [email or web address]     Dr. Liu,     22 year male patient admitted 03/15/23 with Crohn's flare, Concern for Aspiration Pneumonitis, Concern for Ileus, Malnutrition.  H&P states, \"...evaluation and treatment of abdominal pain with nausea and vomiting concern for Crohn's flare versus infectious colitis with associated malnutrition\"     BMI 28.10     No Registered Dietician Assessment    On admission patient was NPO with % of meals eaten after diet was resumed on . Boost Plus was also ordered with meals.     ASPEN criteria for malnutrition requires the presence of at least two of the following: insufficient energy intake, weight loss, loss of muscle mass, loss of subcutaneous fat, localized or generalized fluid accumulation, diminished functional status (measured by calibrated hand  strength).     After study, was malnutrition clinically supported during this admission?  Yes, malnutrition was supported with additional clinical indicators;  ____________  No, malnutrition was not supported  Other__________________  Unable to determine    By submitting this query, we are merely seeking further clarification of documentation to " accurately reflect all conditions that you are monitoring, evaluating, treating or that extend the hospitalization or utilize additional resources of care. Please utilize your independent clinical judgment when addressing the question(s) above.     This query and your response, once completed, will be entered into the legal medical record.    Sincerely,  Yarely Pretty RN, [facility]  Clinical Documentation Integrity Program   [email or web address]

## 2023-03-19 NOTE — OUTREACH NOTE
Prep Survey    Flowsheet Row Responses   [facility]? [facility]   Is LACE score < 7 ? No   Eligibility Readm Mgmt   Discharge diagnosis Abdominal pain   Does the patient have one of the following disease processes/diagnoses(primary or secondary)? Other   Does the patient have Home health ordered? No   Is there a DME ordered? No   Prep survey completed? Yes          Gwendolyn LOPEZ - Registered Nurse

## 2023-03-22 ENCOUNTER — READMISSION MANAGEMENT (OUTPATIENT)
Dept: CALL CENTER | Facility: HOSPITAL | Age: 33
End: 2023-03-22
Payer: MEDICARE

## 2023-03-22 NOTE — OUTREACH NOTE
Medical Week 1 Survey    Flowsheet Row Responses   [facility] patient discharged from? [facility]   Does the patient have one of the following disease processes/diagnoses(primary or secondary)? Other   Week 1 attempt successful? No   Unsuccessful attempts Attempt 1          Hannah Hobbs Registered Nurse

## 2023-03-28 ENCOUNTER — TELEPHONE (OUTPATIENT)
Dept: GASTROENTEROLOGY | Facility: CLINIC | Age: 33
End: 2023-03-28
Payer: MEDICARE

## 2023-03-29 ENCOUNTER — TELEPHONE (OUTPATIENT)
Dept: GASTROENTEROLOGY | Facility: CLINIC | Age: 33
End: 2023-03-29
Payer: MEDICARE

## 2023-03-29 NOTE — TELEPHONE ENCOUNTER
Stools negative for infection  Please call patient and check on his symptoms, please ensure he is familiar with how to take prep so we can have a good prep this time as patient had poor prep in February-he was given Plenvu    Addendum 4/6/23: also please recommend to pt daily MVI, protein shakes, and Boost/Ensure to help w nutrition

## 2023-04-05 ENCOUNTER — READMISSION MANAGEMENT (OUTPATIENT)
Dept: CALL CENTER | Facility: HOSPITAL | Age: 33
End: 2023-04-05
Payer: MEDICARE

## 2023-04-05 NOTE — OUTREACH NOTE
Medical Week 3 Survey    Flowsheet Row Responses   [facility] patient discharged from? [facility]   Does the patient have one of the following disease processes/diagnoses(primary or secondary)? Other   Week 3 attempt successful? No   Unsuccessful attempts Attempt 1          Lulú HUGO - Licensed Nurse

## 2023-04-10 ENCOUNTER — HOSPITAL ENCOUNTER (EMERGENCY)
Facility: HOSPITAL | Age: 33
Discharge: HOME | End: 2023-04-11
Attending: EMERGENCY MEDICINE | Admitting: EMERGENCY MEDICINE
Payer: MEDICARE

## 2023-04-10 ENCOUNTER — APPOINTMENT (OUTPATIENT)
Dept: CT IMAGING | Facility: HOSPITAL | Age: 33
End: 2023-04-10
Payer: MEDICARE

## 2023-04-10 VITALS
WEIGHT: 178.57 LBS | HEIGHT: 69 IN | RESPIRATION RATE: 18 BRPM | SYSTOLIC BLOOD PRESSURE: 106 MMHG | BODY MASS INDEX: 26.45 KG/M2 | DIASTOLIC BLOOD PRESSURE: 64 MMHG | OXYGEN SATURATION: 91 % | HEART RATE: 88 BPM | TEMPERATURE: 98.6 F

## 2023-04-10 DIAGNOSIS — A08.4 VIRAL GASTROENTERITIS: Primary | ICD-10-CM

## 2023-04-10 DIAGNOSIS — R11.2 NAUSEA, VOMITING, AND DIARRHEA: ICD-10-CM

## 2023-04-10 DIAGNOSIS — Z87.19 HISTORY OF CROHN'S DISEASE: ICD-10-CM

## 2023-04-10 DIAGNOSIS — R10.13 ABDOMINAL PAIN, ACUTE, EPIGASTRIC: ICD-10-CM

## 2023-04-10 DIAGNOSIS — R19.7 NAUSEA, VOMITING, AND DIARRHEA: ICD-10-CM

## 2023-04-10 LAB
ALBUMIN SERPL-MCNC: 2.7 G/DL (ref 3.5–5.2)
ALBUMIN/GLOB SERPL: 1 G/DL
ALP SERPL-CCNC: 99 U/L (ref 39–117)
ALT SERPL W P-5'-P-CCNC: 10 U/L (ref 1–41)
ANION GAP SERPL CALCULATED.3IONS-SCNC: 11.4 MMOL/L (ref 5–15)
AST SERPL-CCNC: 11 U/L (ref 1–40)
BACTERIA UR QL AUTO: ABNORMAL /HPF
BASOPHILS # BLD AUTO: 0.01 10*3/MM3 (ref 0–0.2)
BASOPHILS NFR BLD AUTO: 0.3 % (ref 0–1.5)
BILIRUB SERPL-MCNC: 0.3 MG/DL (ref 0–1.2)
BILIRUB UR QL STRIP: ABNORMAL
BUN SERPL-MCNC: 23 MG/DL (ref 6–20)
BUN/CREAT SERPL: 20.7 (ref 7–25)
CALCIUM SPEC-SCNC: 8 MG/DL (ref 8.6–10.5)
CHLORIDE SERPL-SCNC: 106 MMOL/L (ref 98–107)
CLARITY UR: ABNORMAL
CO2 SERPL-SCNC: 20.6 MMOL/L (ref 22–29)
COLOR UR: ABNORMAL
CREAT SERPL-MCNC: 1.11 MG/DL (ref 0.76–1.27)
DEPRECATED RDW RBC AUTO: 62.8 FL (ref 37–54)
EGFRCR SERPLBLD CKD-EPI 2021: 89.4 ML/MIN/1.73
EOSINOPHIL # BLD AUTO: 0.01 10*3/MM3 (ref 0–0.4)
EOSINOPHIL NFR BLD AUTO: 0.3 % (ref 0.3–6.2)
ERYTHROCYTE [DISTWIDTH] IN BLOOD BY AUTOMATED COUNT: 17 % (ref 12.3–15.4)
GLOBULIN UR ELPH-MCNC: 2.6 GM/DL
GLUCOSE SERPL-MCNC: 101 MG/DL (ref 65–99)
GLUCOSE UR STRIP-MCNC: NEGATIVE MG/DL
HCT VFR BLD AUTO: 36.4 % (ref 37.5–51)
HGB BLD-MCNC: 12.2 G/DL (ref 13–17.7)
HGB UR QL STRIP.AUTO: ABNORMAL
HOLD SPECIMEN: NORMAL
HOLD SPECIMEN: NORMAL
HYALINE CASTS UR QL AUTO: ABNORMAL /LPF
IMM GRANULOCYTES # BLD AUTO: 0.01 10*3/MM3 (ref 0–0.05)
IMM GRANULOCYTES NFR BLD AUTO: 0.3 % (ref 0–0.5)
KETONES UR QL STRIP: ABNORMAL
LEUKOCYTE ESTERASE UR QL STRIP.AUTO: NEGATIVE
LIPASE SERPL-CCNC: 7 U/L (ref 13–60)
LYMPHOCYTES # BLD AUTO: 0.65 10*3/MM3 (ref 0.7–3.1)
LYMPHOCYTES NFR BLD AUTO: 17.2 % (ref 19.6–45.3)
MCH RBC QN AUTO: 33.9 PG (ref 26.6–33)
MCHC RBC AUTO-ENTMCNC: 33.5 G/DL (ref 31.5–35.7)
MCV RBC AUTO: 101.1 FL (ref 79–97)
MONOCYTES # BLD AUTO: 0.38 10*3/MM3 (ref 0.1–0.9)
MONOCYTES NFR BLD AUTO: 10.1 % (ref 5–12)
NEUTROPHILS NFR BLD AUTO: 2.72 10*3/MM3 (ref 1.7–7)
NEUTROPHILS NFR BLD AUTO: 71.8 % (ref 42.7–76)
NITRITE UR QL STRIP: NEGATIVE
NRBC BLD AUTO-RTO: 0 /100 WBC (ref 0–0.2)
PH UR STRIP.AUTO: 5.5 [PH] (ref 5–8)
PLATELET # BLD AUTO: 349 10*3/MM3 (ref 140–450)
PMV BLD AUTO: 8.8 FL (ref 6–12)
POTASSIUM SERPL-SCNC: 4.6 MMOL/L (ref 3.5–5.2)
PROT SERPL-MCNC: 5.3 G/DL (ref 6–8.5)
PROT UR QL STRIP: ABNORMAL
RBC # BLD AUTO: 3.6 10*6/MM3 (ref 4.14–5.8)
RBC # UR STRIP: ABNORMAL /HPF
REF LAB TEST METHOD: ABNORMAL
SODIUM SERPL-SCNC: 138 MMOL/L (ref 136–145)
SP GR UR STRIP: >=1.03 (ref 1–1.03)
SQUAMOUS #/AREA URNS HPF: ABNORMAL /HPF
UROBILINOGEN UR QL STRIP: ABNORMAL
WBC # UR STRIP: ABNORMAL /HPF
WBC NRBC COR # BLD: 3.78 10*3/MM3 (ref 3.4–10.8)
WHOLE BLOOD HOLD COAG: NORMAL
WHOLE BLOOD HOLD SPECIMEN: NORMAL

## 2023-04-10 PROCEDURE — 25010000002 KETOROLAC TROMETHAMINE PER 15 MG: Performed by: REGISTERED NURSE

## 2023-04-10 PROCEDURE — 74177 CT ABD & PELVIS W/CONTRAST: CPT

## 2023-04-10 PROCEDURE — 80053 COMPREHEN METABOLIC PANEL: CPT

## 2023-04-10 PROCEDURE — 36415 COLL VENOUS BLD VENIPUNCTURE: CPT

## 2023-04-10 PROCEDURE — 25010000002 ONDANSETRON PER 1 MG: Performed by: REGISTERED NURSE

## 2023-04-10 PROCEDURE — 85025 COMPLETE CBC W/AUTO DIFF WBC: CPT

## 2023-04-10 PROCEDURE — 25510000001 IOPAMIDOL PER 1 ML: Performed by: EMERGENCY MEDICINE

## 2023-04-10 PROCEDURE — 96375 TX/PRO/DX INJ NEW DRUG ADDON: CPT

## 2023-04-10 PROCEDURE — 96376 TX/PRO/DX INJ SAME DRUG ADON: CPT

## 2023-04-10 PROCEDURE — 81001 URINALYSIS AUTO W/SCOPE: CPT

## 2023-04-10 PROCEDURE — 25010000002 METOCLOPRAMIDE PER 10 MG: Performed by: EMERGENCY MEDICINE

## 2023-04-10 PROCEDURE — 96374 THER/PROPH/DIAG INJ IV PUSH: CPT

## 2023-04-10 PROCEDURE — 99284 EMERGENCY DEPT VISIT MOD MDM: CPT

## 2023-04-10 PROCEDURE — 83690 ASSAY OF LIPASE: CPT

## 2023-04-10 PROCEDURE — 25010000002 HYDROMORPHONE 1 MG/ML SOLUTION: Performed by: EMERGENCY MEDICINE

## 2023-04-10 RX ORDER — KETOROLAC TROMETHAMINE 15 MG/ML
15 INJECTION, SOLUTION INTRAMUSCULAR; INTRAVENOUS ONCE
Status: COMPLETED | OUTPATIENT
Start: 2023-04-10 | End: 2023-04-10

## 2023-04-10 RX ORDER — DICYCLOMINE HYDROCHLORIDE 10 MG/1
20 CAPSULE ORAL ONCE
Status: COMPLETED | OUTPATIENT
Start: 2023-04-10 | End: 2023-04-10

## 2023-04-10 RX ORDER — SODIUM CHLORIDE 0.9 % (FLUSH) 0.9 %
10 SYRINGE (ML) INJECTION AS NEEDED
Status: DISCONTINUED | OUTPATIENT
Start: 2023-04-10 | End: 2023-04-11 | Stop reason: HOSPADM

## 2023-04-10 RX ORDER — ONDANSETRON 2 MG/ML
4 INJECTION INTRAMUSCULAR; INTRAVENOUS ONCE
Status: COMPLETED | OUTPATIENT
Start: 2023-04-10 | End: 2023-04-10

## 2023-04-10 RX ORDER — METOCLOPRAMIDE HYDROCHLORIDE 5 MG/ML
10 INJECTION INTRAMUSCULAR; INTRAVENOUS ONCE
Status: COMPLETED | OUTPATIENT
Start: 2023-04-10 | End: 2023-04-10

## 2023-04-10 RX ADMIN — ONDANSETRON 4 MG: 2 INJECTION INTRAMUSCULAR; INTRAVENOUS at 16:15

## 2023-04-10 RX ADMIN — ONDANSETRON 4 MG: 2 INJECTION INTRAMUSCULAR; INTRAVENOUS at 18:46

## 2023-04-10 RX ADMIN — DICYCLOMINE HYDROCHLORIDE 20 MG: 10 CAPSULE ORAL at 18:46

## 2023-04-10 RX ADMIN — IOPAMIDOL 100 ML: 755 INJECTION, SOLUTION INTRAVENOUS at 22:46

## 2023-04-10 RX ADMIN — SODIUM CHLORIDE 1000 ML: 9 INJECTION, SOLUTION INTRAVENOUS at 16:15

## 2023-04-10 RX ADMIN — HYDROMORPHONE HYDROCHLORIDE 1 MG: 1 INJECTION, SOLUTION INTRAMUSCULAR; INTRAVENOUS; SUBCUTANEOUS at 23:04

## 2023-04-10 RX ADMIN — KETOROLAC TROMETHAMINE 15 MG: 15 INJECTION, SOLUTION INTRAMUSCULAR; INTRAVENOUS at 16:16

## 2023-04-10 RX ADMIN — SODIUM CHLORIDE 1000 ML: 9 INJECTION, SOLUTION INTRAVENOUS at 23:04

## 2023-04-10 RX ADMIN — METOCLOPRAMIDE 10 MG: 5 INJECTION, SOLUTION INTRAMUSCULAR; INTRAVENOUS at 23:04

## 2023-04-10 NOTE — Clinical Note
[facility] EMERGENCY ROOM  [address]  [place] [address]  Phone: [phone]    Abhijeet Pitts was seen and treated in our emergency department on 4/10/2023.  He may return to work on 04/13/2023.         Thank you for choosing [facility].    Ian Vizcaino MD

## 2023-04-10 NOTE — ED PROVIDER NOTES
Time: 3:33 PM EDT  Date of encounter:  4/10/2023  Independent Historian/Clinical History and Information was obtained by:   Patient  Chief Complaint   Patient presents with   • Vomiting   • Abdominal Pain       History is limited by: N/A    History of Present Illness:  Patient is a 32 y.o. year old male who presents to the emergency department for evaluation of severe waxing and waning epigastric abdominal pain, nausea/vomiting, and diarrhea for 3 days.  Denies fever or chills.    He states this pain is different and worse than his usual Crohn's flares.    He denies any sick contacts or suspicious food exposure.            HPI    Patient Care Team  Primary Care Provider: Provider, No Known    Past Medical History:     Allergies   Allergen Reactions   • Elemental Sulfur Swelling   • Fish-Derived Products Unknown - Low Severity   • Naloxone Swelling   • Shellfish-Derived Products Unknown - Low Severity   • Tylenol [Acetaminophen] Swelling     Past Medical History:   Diagnosis Date   • Crohn's disease      Past Surgical History:   Procedure Laterality Date   • COLON SURGERY     • SIGMOIDOSCOPY N/A 02/14/2023    Procedure: SIGMOIDOSCOPY FLEXIBLE;  Surgeon: Valentin Mason MD;  Location: [facility] END[facility]COPY;  Service: Gastroenterology;  Laterality: N/A;  POOR PREP   • STOMACH SURGERY       Family History   Problem Relation Age of Onset   • Colon cancer Neg Hx        Home Medications:  Prior to Admission medications    Medication Sig Start Date End Date Taking? Authorizing Provider   buprenorphine (SUBUTEX) 8 MG sublingual tablet SL tablet Place 22 mg under the tongue Daily.    ProviderVan MD   cloNIDine (CATAPRES) 0.2 MG tablet Take 1 tablet by mouth 2 (Two) Times a Day As Needed.    ProviderVan MD   dicyclomine (BENTYL) 20 MG tablet Take 1 tablet by mouth Every 6 (Six) Hours As Needed (abdominal pain). 3/8/23   Maverick Quintero MD   gabapentin (NEURONTIN) 600 MG tablet Take 1 tablet by mouth 3  (Three) Times a Day.    Van Segura MD   hydrOXYzine pamoate (VISTARIL) 50 MG capsule Take 1 capsule by mouth Daily.    Van Segura MD   melatonin 3 MG tablet Take 2 tablets by mouth Every Night.    Van Segura MD   OLANZapine zydis (zyPREXA) 20 MG disintegrating tablet Place 1 tablet on the tongue Every Night.    Van Segura MD   omeprazole (priLOSEC) 40 MG capsule Take 1 capsule by mouth Daily.    Van Segura MD   ondansetron ODT (ZOFRAN-ODT) 4 MG disintegrating tablet Place 1 tablet on the tongue Every 8 (Eight) Hours As Needed for Nausea or Vomiting. 1/20/23   Janel Ernandez APRN   PEG-KCl-NaCl-NaSulf-Na Asc-C (PLENVU) 140 g reconstituted solution solution Take 140 g by mouth Take As Directed. Follow Instructions Provided from Office. 3/15/23   Radhika Wilkins APRN   predniSONE (DELTASONE) 5 MG tablet Take 8 tablets by mouth Daily for 5 days, THEN 6 tablets Daily for 5 days, THEN 4 tablets Daily for 5 days, THEN 3 tablets Daily for 5 days, THEN 2 tablets Daily for 5 days, THEN 1 tablet Daily for 5 days. 3/18/23 4/17/23  Syd Liu MD   QUEtiapine (SEROquel) 300 MG tablet Take 1 tablet by mouth Every Night.    Van Segura MD   traZODone (DESYREL) 100 MG tablet Take 3 tablets by mouth Every Night.    Van Segura MD   venlafaxine XR (EFFEXOR-XR) 75 MG 24 hr capsule Take 1 capsule by mouth Daily.    Van Segura MD        Social History:   Social History     Tobacco Use   • Smoking status: Every Day     Packs/day: 1.00     Years: 10.00     Pack years: 10.00     Types: Cigarettes   • Smokeless tobacco: Never   Vaping Use   • Vaping Use: Every day   Substance Use Topics   • Alcohol use: Not Currently   • Drug use: Not Currently         Review of Systems:  Review of Systems   Gastrointestinal: Positive for abdominal pain, diarrhea, nausea and vomiting.        Physical Exam:  /64   Pulse 88   Temp 98.6 °F (37 °C)   " Resp 18   Ht 175.3 cm (69\")   Wt 81 kg (178 lb 9.2 oz)   SpO2 91%   BMI 26.37 kg/m²     General: Awake alert and in moderate distress due to abdominal pain    HEENT: Head normocephalic atraumatic, eyes PERRLA EOMI, nose normal, oropharynx normal.  Mucous membranes look dry.  Neck: Supple full range of motion, no meningismus, no lymphadenopathy    Heart: Regular rate and rhythm, no murmurs or rubs, 2+ radial pulses bilaterally    Lungs: Clear to auscultation bilaterally without wheezes or crackles, no respiratory distress    Abdomen: Soft, moderate tenderness only in the epigastrium, nondistended, no rebound or guarding    Skin: Warm, dry, no rash    Musculoskeletal: Normal range of motion, no lower extremity edema    Neurologic: Oriented x3, no motor deficits no sensory deficits    Psychiatric: Mood appears stable, no psychosis            Procedures:  Procedures      Medical Decision Making:      Comorbidities that affect care:    Crohn's disease    External Notes reviewed:    Hospital Discharge Summary: I reviewed his most recent hospital discharge summary from last month's admission for abdominal pain and Crohn's exacerbation      The following orders were placed and all results were independently analyzed by me:  Orders Placed This Encounter   Procedures   • CT Abdomen Pelvis With Contrast   • [place] Draw   • Comprehensive Metabolic Panel   • Lipase   • Urinalysis With Microscopic If Indicated (No Culture) - Urine, Clean Catch   • CBC Auto Differential   • Urinalysis, Microscopic Only - Urine, Clean Catch   • Undress & Gown   • CBC & Differential   • Green Top (Gel)   • Lavender Top   • Gold Top - SST   • Light Blue Top       Medications Given in the Emergency Department:  Medications   sodium chloride 0.9 % bolus 1,000 mL (0 mL Intravenous Stopped 4/10/23 1645)   ondansetron (ZOFRAN) injection 4 mg (4 mg Intravenous Given 4/10/23 1615)   ketorolac (TORADOL) injection 15 mg (15 mg Intravenous Given 4/10/23 " 1616)   dicyclomine (BENTYL) capsule 20 mg (20 mg Oral Given 4/10/23 1846)   ondansetron (ZOFRAN) injection 4 mg (4 mg Intravenous Given 4/10/23 1846)   iopamidol (ISOVUE-370) 76 % injection 100 mL (100 mL Intravenous Given 4/10/23 2246)   HYDROmorphone (DILAUDID) injection 1 mg (1 mg Intravenous Given 4/10/23 2304)   sodium chloride 0.9 % bolus 1,000 mL (0 mL Intravenous Stopped 4/10/23 2334)   metoclopramide (REGLAN) injection 10 mg (10 mg Intravenous Given 4/10/23 2304)        ED Course:    The patient was initially evaluated in the triage area where orders were placed. The patient was later dispositioned by Ian Vizcaino MD.      The patient was advised to stay for completion of workup which includes but is not limited to communication of labs and radiological results, reassessment and plan. The patient was advised that leaving prior to disposition by a provider could result in critical findings that are not communicated to the patient.     ED Course as of 04/11/23 0626   Mon Apr 10, 2023   1734 Patient states that nausea is better but not gone and he still has some pain. [CW]      ED Course User Index  [CW] Zully García APRN       Labs:    Lab Results (last 24 hours)     Procedure Component Value Units Date/Time    CBC & Differential [[id number]]  (Abnormal) Collected: 04/10/23 1547    Specimen: Blood Updated: 04/10/23 1600    Narrative:      The following orders were created for panel order CBC & Differential.  Procedure                               Abnormality         Status                     ---------                               -----------         ------                     CBC Auto Differential[[id number]]        Abnormal            Final result                 Please view results for these tests on the individual orders.    Comprehensive Metabolic Panel [[id number]]  (Abnormal) Collected: 04/10/23 1547    Specimen: Blood Updated: 04/10/23 1632     Glucose 101 mg/dL      BUN 23 mg/dL      Creatinine  1.11 mg/dL      Sodium 138 mmol/L      Potassium 4.6 mmol/L      Chloride 106 mmol/L      CO2 20.6 mmol/L      Calcium 8.0 mg/dL      Total Protein 5.3 g/dL      Albumin 2.7 g/dL      ALT (SGPT) 10 U/L      AST (SGOT) 11 U/L      Alkaline Phosphatase 99 U/L      Total Bilirubin 0.3 mg/dL      Globulin 2.6 gm/dL      A/G Ratio 1.0 g/dL      BUN/Creatinine Ratio 20.7     Anion Gap 11.4 mmol/L      eGFR 89.4 mL/min/1.73     Narrative:      GFR Normal >60  Chronic Kidney Disease <60  Kidney Failure <15      Lipase [[id number]]  (Abnormal) Collected: 04/10/23 1547    Specimen: Blood Updated: 04/10/23 1632     Lipase 7 U/L     CBC Auto Differential [[id number]]  (Abnormal) Collected: 04/10/23 1547    Specimen: Blood Updated: 04/10/23 1600     WBC 3.78 10*3/mm3      RBC 3.60 10*6/mm3      Hemoglobin 12.2 g/dL      Hematocrit 36.4 %      .1 fL      MCH 33.9 pg      MCHC 33.5 g/dL      RDW 17.0 %      RDW-SD 62.8 fl      MPV 8.8 fL      Platelets 349 10*3/mm3      Neutrophil % 71.8 %      Lymphocyte % 17.2 %      Monocyte % 10.1 %      Eosinophil % 0.3 %      Basophil % 0.3 %      Immature Grans % 0.3 %      Neutrophils, Absolute 2.72 10*3/mm3      Lymphocytes, Absolute 0.65 10*3/mm3      Monocytes, Absolute 0.38 10*3/mm3      Eosinophils, Absolute 0.01 10*3/mm3      Basophils, Absolute 0.01 10*3/mm3      Immature Grans, Absolute 0.01 10*3/mm3      nRBC 0.0 /100 WBC     Urinalysis With Microscopic If Indicated (No Culture) - Urine, Clean Catch [[id number]]  (Abnormal) Collected: 04/10/23 1928    Specimen: Urine, Clean Catch Updated: 04/10/23 2012     Color, UA Dark Yellow     Appearance, UA Cloudy     pH, UA 5.5     Specific Gravity, UA >=1.030     Glucose, UA Negative     Ketones, UA 15 mg/dL (1+)     Bilirubin, UA Small (1+)     Blood, UA Moderate (2+)     Protein, UA Trace     Leuk Esterase, UA Negative     Nitrite, UA Negative     Urobilinogen, UA 1.0 E.U./dL    Urinalysis, Microscopic Only - Urine, Clean Catch  [[id number]]  (Abnormal) Collected: 04/10/23 1928    Specimen: Urine, Clean Catch Updated: 04/10/23 2012     RBC, UA Too Numerous to Count /HPF      WBC, UA 0-2 /HPF      Bacteria, UA None Seen /HPF      Squamous Epithelial Cells, UA 0-2 /HPF      Hyaline Casts, UA 0-2 /LPF      Methodology Automated Microscopy           Imaging:    CT Abdomen Pelvis With Contrast    Result Date: 4/10/2023  PROCEDURE: CT ABDOMEN PELVIS W CONTRAST  COMPARISONS: 3/15/2023; 1/20/2023.  INDICATIONS: GENERALIZED ABDOMINAL PAIN. NAUSEA & VOMITING. HISTORY OF CROHN'S DISEASE.  TECHNIQUE: After obtaining the patient's consent, 596 CT images were created with non-ionic intravenous contrast material.  No oral contrast agent was administered for the study  PROTOCOL:   Standard CT imaging protocol performed.    RADIATION:   Total DLP: 491 mGy*cm.   Automated exposure control was utilized to minimize radiation dose. CONTRAST: 85 mL Isovue 370 I.V. LABS:   eGFR: >60 mL/min/1.73m^2  FINDINGS: Extensive fluid-filled bowel is identified, especially involving small bowel but also colon.  The findings may represent a generalized adynamic ileus.  A distal small bowel obstruction cannot be excluded but is probably less likely.  The distal ileum has a thickened appearance, which may be related to the patient's known Crohn's disease.  Postoperative changes are suggested in the right lower quadrant involving cecum and/or distal small bowel.  Please correlate with the surgical history.  No pneumoperitoneum or pneumatosis is seen.  No pericolonic or perienteric fluid collections are seen to suggest abscess.  There are small to moderate sized mesenteric lymph nodes which are nonspecific.  They may be reactive in nature.  There is distension of the gallbladder, which is new since the prior 3/15/2023 study.  No gallstones are seen.  No acute cholecystitis.  No acute pancreatitis.  The previously seen airspace disease in the lung bases, greater on the right than  the left, has decreased with minimal subsegmental atelectasis persisting.  No definite venous thrombosis is seen.  No renal/ureteral stones or hydronephrosis or obstructive uropathy.  No acute pyelonephritis.  Pelvic phleboliths are seen.  No urinary bladder calculi are identified.  No acute fracture or aggressive osseous lesion is suggested.  Otherwise, no acute findings are seen, and no significant interval change is appreciated since the prior CT studies.        1. Interval improvement in the airspace disease in the lung bases, especially on the right, since 3/15/2023.  Mild subsegmental atelectasis is thought to persist in the lung bases.  No definite acute infiltrate is seen.  2. Dilated bowel persists, including small bowel and colon, with narrowing and mural thickening of the distal ileum.  The findings may be related to acute exacerbation of the patient's known Crohn's enteritis possible partial or complete distal mechanical small bowel obstruction versus a generalized adynamic ileus.  Similar findings were seen on the 1/20/2023 CT study.  The 3/15/2023 study was a especially obscured in the right lower quadrant by motion artifact.  No pneumoperitoneum or pneumatosis is seen.  No portal or mesenteric venous gas is seen to suggest ischemic enteritis No ascites.  No definite abscess or fistula is appreciated.  3. No other definite acute findings are appreciated.  4. Please see above comments for further detail. 1.   Please note that portions of this note were completed with a voice recognition program.  SRINIVASA NOEL JR, MD       Electronically Signed and Approved By: SRINIVASA NOEL JR, MD on 4/10/2023 at 23:52                  Differential Diagnosis and Discussion:      Abdominal Pain: Based on the patient's signs and symptoms, I considered abdominal aortic aneurysm, small bowel obstruction, pancreatitis, acute cholecystitis, acute appendecitis, peptic ulcer disease, gastritis, colitis, endocrine disorders,  irritable bowel syndrome and other differential diagnosis an etiology of the patient's abdominal pain.    All labs were reviewed and interpreted by me.  CT scan radiology interpretation was reviewed by me.    MDM               This patient is a 32-year-old male with history of Crohn's disease now presenting with severe epigastric abdominal pain and nausea and vomiting and diarrhea for 3 days.    Given his history of Crohn's disease I will check labs and a CT scan.    I am treating his pain with IV pain and nausea meds and giving him IV fluids for hydration.    Lab work looks to be roughly at baseline, including normal white blood cell count of 3.7 and his baseline anemia with hemoglobin of 12.  He has normal renal function and LFTs.  Lipase normal.    CT scan shows some bowel wall thickening consistent with previous CT scans of his Crohn's disease, and also some fluid-filled distended small bowel that could represent adynamic ileus, enteritis, developing bowel obstruction.        His abdominal exam is relatively benign and he has had vomiting and diarrhea and I believe he just has a self-limiting viral gastroenteritis going around the community.    After some IV fluids and pain and nausea medicine he looks improved clinically and safe for outpatient management.  I have low suspicion for bowel obstruction or Crohn's disease at this time.    I will give him strict return precautions for any failure to improve in a couple days or any worsening symptoms.              I will give him supportive care instructions to hydrate and eat a bland diet and I will call in a couple days of pain and nausea meds for symptom relief.                    Patient Care Considerations:          Consultants/Shared Management Plan:        Social Determinants of Health:    Patient is independent, reliable, and has access to care.       Disposition and Care Coordination:    Discharged: I considered escalation of care by admitting this patient  for observation, however the patient has improved and is suitable and  stable for discharge.    I have explained the patient´s condition, diagnoses and treatment plan based on the information available to me at this time. I have answered questions and addressed any concerns. The patient has a good  understanding of the patient´s diagnosis, condition, and treatment plan as can be expected at this point. The vital signs have been stable. The patient´s condition is stable and appropriate for discharge from the emergency department.      The patient will pursue further outpatient evaluation with the primary care physician or other designated or consulting physician as outlined in the discharge instructions. They are agreeable to this plan of care and follow-up instructions have been explained in detail. The patient has received these instructions in written format and have expressed an understanding of the discharge instructions. The patient is aware that any significant change in condition or worsening of symptoms should prompt an immediate return to this or the closest emergency department or call to 911.  I have explained discharge medications and the need for follow up with the patient/caretakers. This was also printed in the discharge instructions. Patient was discharged with the following medications and follow up:      Medication List      New Prescriptions    ondansetron ODT 4 MG disintegrating tablet  Commonly known as: ZOFRAN-ODT  Place 1 tablet on the tongue Every 6 (Six) Hours As Needed for Nausea or Vomiting.     oxyCODONE 5 MG immediate release tablet  Commonly known as: Roxicodone  Take 1 tablet by mouth Every 4 (Four) Hours As Needed for Severe Pain.           Where to Get Your Medications      These medications [place] [facility] [place] [address] [facility] [address] - [phone] [facility] [phone] FX  [address] [facility] [address], [place] [address]    Phone:  [phone]   · ondansetron ODT 4 MG disintegrating tablet  · oxyCODONE 5 MG immediate release tablet      Provider, No Known  [facility]  [place] [id number]    Call in 2 days  As needed, If symptoms worsen, for a follow-up appointment       Final diagnoses:   Abdominal pain, acute, epigastric   Nausea, vomiting, and diarrhea   History of Crohn's disease   Viral gastroenteritis        ED Disposition     ED Disposition   Discharge    Condition   Stable    Comment   --             This medical record created using voice recognition software.           Ian Vizcaino MD  04/11/23 0632

## 2023-04-10 NOTE — ED NOTES
Approached pt to get urine sample for the second time and patient was in and out of sleep and couldn't keep eyes open to respond. RN made aware.

## 2023-04-11 ENCOUNTER — READMISSION MANAGEMENT (OUTPATIENT)
Dept: CALL CENTER | Facility: HOSPITAL | Age: 33
End: 2023-04-11
Payer: MEDICARE

## 2023-04-11 RX ORDER — OXYCODONE HYDROCHLORIDE 5 MG/1
5 TABLET ORAL EVERY 4 HOURS PRN
Qty: 12 TABLET | Refills: 0 | Status: SHIPPED | OUTPATIENT
Start: 2023-04-11

## 2023-04-11 RX ORDER — ONDANSETRON 4 MG/1
4 TABLET, ORALLY DISINTEGRATING ORAL EVERY 6 HOURS PRN
Qty: 12 TABLET | Refills: 0 | Status: SHIPPED | OUTPATIENT
Start: 2023-04-11

## 2023-04-11 NOTE — DISCHARGE INSTRUCTIONS
Your blood work looked okay today and your CT scan looks like you do have some distended and fluid-filled loops of intestine, most likely seen with stomach virus.    Try to eat a boring diet like soup and crackers or toast and drink plenty fluids stay hydrated and you can use the nausea medicine and pain medicine as needed for symptom relief and follow-up with your doctor.    Return to the ER if anything worsens.

## 2023-04-11 NOTE — OUTREACH NOTE
Medical Week 3 Survey    Flowsheet Row Responses   [facility] facility patient discharged from? [facility]   Does the patient have one of the following disease processes/diagnoses(primary or secondary)? Other   Week 3 attempt successful? No   Unsuccessful attempts Attempt 2  [attempted all numbers listed]          RYAN MEJIA - Registered Nurse

## 2023-04-17 ENCOUNTER — HOSPITAL ENCOUNTER (INPATIENT)
Facility: HOSPITAL | Age: 33
LOS: 3 days | Discharge: HOME | End: 2023-04-20
Attending: EMERGENCY MEDICINE | Admitting: FAMILY MEDICINE
Payer: MEDICARE

## 2023-04-17 ENCOUNTER — APPOINTMENT (OUTPATIENT)
Dept: CT IMAGING | Facility: HOSPITAL | Age: 33
End: 2023-04-17
Payer: MEDICARE

## 2023-04-17 ENCOUNTER — APPOINTMENT (OUTPATIENT)
Dept: GENERAL RADIOLOGY | Facility: HOSPITAL | Age: 33
End: 2023-04-17
Payer: MEDICARE

## 2023-04-17 DIAGNOSIS — J18.9 PNEUMONIA OF BOTH LUNGS DUE TO INFECTIOUS ORGANISM, UNSPECIFIED PART OF LUNG: Primary | ICD-10-CM

## 2023-04-17 DIAGNOSIS — A41.9 SEPSIS, DUE TO UNSPECIFIED ORGANISM, UNSPECIFIED WHETHER ACUTE ORGAN DYSFUNCTION PRESENT: ICD-10-CM

## 2023-04-17 LAB
ALBUMIN SERPL-MCNC: 2.3 G/DL (ref 3.5–5.2)
ALBUMIN/GLOB SERPL: 1.1 G/DL
ALP SERPL-CCNC: 100 U/L (ref 39–117)
ALT SERPL W P-5'-P-CCNC: 9 U/L (ref 1–41)
ANION GAP SERPL CALCULATED.3IONS-SCNC: 11.6 MMOL/L (ref 5–15)
AST SERPL-CCNC: 10 U/L (ref 1–40)
BASOPHILS # BLD AUTO: 0.02 10*3/MM3 (ref 0–0.2)
BASOPHILS NFR BLD AUTO: 0.2 % (ref 0–1.5)
BILIRUB SERPL-MCNC: 0.3 MG/DL (ref 0–1.2)
BILIRUB UR QL STRIP: NEGATIVE
BUN SERPL-MCNC: 14 MG/DL (ref 6–20)
BUN/CREAT SERPL: 16.9 (ref 7–25)
CALCIUM SPEC-SCNC: 7.2 MG/DL (ref 8.6–10.5)
CHLORIDE SERPL-SCNC: 106 MMOL/L (ref 98–107)
CLARITY UR: CLEAR
CO2 SERPL-SCNC: 20.4 MMOL/L (ref 22–29)
COLOR UR: YELLOW
CREAT SERPL-MCNC: 0.83 MG/DL (ref 0.76–1.27)
D-LACTATE SERPL-SCNC: 2.1 MMOL/L (ref 0.5–2)
D-LACTATE SERPL-SCNC: 2.5 MMOL/L (ref 0.5–2)
DEPRECATED RDW RBC AUTO: 57.1 FL (ref 37–54)
EGFRCR SERPLBLD CKD-EPI 2021: 117.8 ML/MIN/1.73
EOSINOPHIL # BLD AUTO: 0 10*3/MM3 (ref 0–0.4)
EOSINOPHIL NFR BLD AUTO: 0 % (ref 0.3–6.2)
ERYTHROCYTE [DISTWIDTH] IN BLOOD BY AUTOMATED COUNT: 15.8 % (ref 12.3–15.4)
ETHANOL BLD-MCNC: <10 MG/DL (ref 0–10)
ETHANOL UR QL: <0.01 %
GLOBULIN UR ELPH-MCNC: 2.1 GM/DL
GLUCOSE SERPL-MCNC: 120 MG/DL (ref 65–99)
GLUCOSE UR STRIP-MCNC: NEGATIVE MG/DL
HCT VFR BLD AUTO: 31.3 % (ref 37.5–51)
HGB BLD-MCNC: 10.6 G/DL (ref 13–17.7)
HGB UR QL STRIP.AUTO: NEGATIVE
HOLD SPECIMEN: NORMAL
HOLD SPECIMEN: NORMAL
IMM GRANULOCYTES # BLD AUTO: 0.04 10*3/MM3 (ref 0–0.05)
IMM GRANULOCYTES NFR BLD AUTO: 0.5 % (ref 0–0.5)
KETONES UR QL STRIP: NEGATIVE
LEUKOCYTE ESTERASE UR QL STRIP.AUTO: NEGATIVE
LIPASE SERPL-CCNC: 5 U/L (ref 13–60)
LYMPHOCYTES # BLD AUTO: 0.71 10*3/MM3 (ref 0.7–3.1)
LYMPHOCYTES NFR BLD AUTO: 8.4 % (ref 19.6–45.3)
MCH RBC QN AUTO: 33.2 PG (ref 26.6–33)
MCHC RBC AUTO-ENTMCNC: 33.9 G/DL (ref 31.5–35.7)
MCV RBC AUTO: 98.1 FL (ref 79–97)
MONOCYTES # BLD AUTO: 0.66 10*3/MM3 (ref 0.1–0.9)
MONOCYTES NFR BLD AUTO: 7.8 % (ref 5–12)
NEUTROPHILS NFR BLD AUTO: 6.98 10*3/MM3 (ref 1.7–7)
NEUTROPHILS NFR BLD AUTO: 83.1 % (ref 42.7–76)
NITRITE UR QL STRIP: NEGATIVE
NRBC BLD AUTO-RTO: 0 /100 WBC (ref 0–0.2)
PH UR STRIP.AUTO: 6.5 [PH] (ref 5–8)
PLATELET # BLD AUTO: 341 10*3/MM3 (ref 140–450)
PMV BLD AUTO: 9.3 FL (ref 6–12)
POTASSIUM SERPL-SCNC: 3.5 MMOL/L (ref 3.5–5.2)
PROT SERPL-MCNC: 4.4 G/DL (ref 6–8.5)
PROT UR QL STRIP: NEGATIVE
RBC # BLD AUTO: 3.19 10*6/MM3 (ref 4.14–5.8)
SODIUM SERPL-SCNC: 138 MMOL/L (ref 136–145)
SP GR UR STRIP: >1.03 (ref 1–1.03)
UROBILINOGEN UR QL STRIP: ABNORMAL
WBC NRBC COR # BLD: 8.41 10*3/MM3 (ref 3.4–10.8)
WHOLE BLOOD HOLD COAG: NORMAL
WHOLE BLOOD HOLD SPECIMEN: NORMAL

## 2023-04-17 PROCEDURE — 80053 COMPREHEN METABOLIC PANEL: CPT | Performed by: EMERGENCY MEDICINE

## 2023-04-17 PROCEDURE — 36415 COLL VENOUS BLD VENIPUNCTURE: CPT

## 2023-04-17 PROCEDURE — 25010000002 ONDANSETRON PER 1 MG: Performed by: EMERGENCY MEDICINE

## 2023-04-17 PROCEDURE — 74018 RADEX ABDOMEN 1 VIEW: CPT

## 2023-04-17 PROCEDURE — 25010000002 PIPERACILLIN SOD-TAZOBACTAM PER 1 G: Performed by: EMERGENCY MEDICINE

## 2023-04-17 PROCEDURE — 85025 COMPLETE CBC W/AUTO DIFF WBC: CPT | Performed by: EMERGENCY MEDICINE

## 2023-04-17 PROCEDURE — 81003 URINALYSIS AUTO W/O SCOPE: CPT | Performed by: EMERGENCY MEDICINE

## 2023-04-17 PROCEDURE — 83605 ASSAY OF LACTIC ACID: CPT | Performed by: EMERGENCY MEDICINE

## 2023-04-17 PROCEDURE — 25010000002 FENTANYL CITRATE (PF) 50 MCG/ML SOLUTION: Performed by: EMERGENCY MEDICINE

## 2023-04-17 PROCEDURE — 25510000001 IOPAMIDOL PER 1 ML: Performed by: EMERGENCY MEDICINE

## 2023-04-17 PROCEDURE — 94799 UNLISTED PULMONARY SVC/PX: CPT

## 2023-04-17 PROCEDURE — 82077 ASSAY SPEC XCP UR&BREATH IA: CPT | Performed by: EMERGENCY MEDICINE

## 2023-04-17 PROCEDURE — 74177 CT ABD & PELVIS W/CONTRAST: CPT

## 2023-04-17 PROCEDURE — 99222 1ST HOSP IP/OBS MODERATE 55: CPT | Performed by: FAMILY MEDICINE

## 2023-04-17 PROCEDURE — 83690 ASSAY OF LIPASE: CPT | Performed by: EMERGENCY MEDICINE

## 2023-04-17 PROCEDURE — 87040 BLOOD CULTURE FOR BACTERIA: CPT | Performed by: EMERGENCY MEDICINE

## 2023-04-17 PROCEDURE — 99284 EMERGENCY DEPT VISIT MOD MDM: CPT

## 2023-04-17 RX ORDER — DICYCLOMINE HCL 20 MG
20 TABLET ORAL EVERY 6 HOURS PRN
Status: DISCONTINUED | OUTPATIENT
Start: 2023-04-17 | End: 2023-04-20 | Stop reason: HOSPADM

## 2023-04-17 RX ORDER — SODIUM CHLORIDE 9 MG/ML
40 INJECTION, SOLUTION INTRAVENOUS AS NEEDED
Status: DISCONTINUED | OUTPATIENT
Start: 2023-04-17 | End: 2023-04-20 | Stop reason: HOSPADM

## 2023-04-17 RX ORDER — ONDANSETRON 2 MG/ML
4 INJECTION INTRAMUSCULAR; INTRAVENOUS ONCE
Status: COMPLETED | OUTPATIENT
Start: 2023-04-17 | End: 2023-04-17

## 2023-04-17 RX ORDER — FENTANYL CITRATE 50 UG/ML
50 INJECTION, SOLUTION INTRAMUSCULAR; INTRAVENOUS ONCE
Status: COMPLETED | OUTPATIENT
Start: 2023-04-17 | End: 2023-04-17

## 2023-04-17 RX ORDER — VENLAFAXINE HYDROCHLORIDE 75 MG/1
75 CAPSULE, EXTENDED RELEASE ORAL DAILY
Status: DISCONTINUED | OUTPATIENT
Start: 2023-04-18 | End: 2023-04-20 | Stop reason: HOSPADM

## 2023-04-17 RX ORDER — SODIUM CHLORIDE 0.9 % (FLUSH) 0.9 %
10 SYRINGE (ML) INJECTION EVERY 12 HOURS SCHEDULED
Status: DISCONTINUED | OUTPATIENT
Start: 2023-04-17 | End: 2023-04-20 | Stop reason: HOSPADM

## 2023-04-17 RX ORDER — PREDNISONE 5 MG/1
5 TABLET ORAL DAILY
Status: DISCONTINUED | OUTPATIENT
Start: 2023-04-18 | End: 2023-04-20 | Stop reason: HOSPADM

## 2023-04-17 RX ORDER — GABAPENTIN 300 MG/1
600 CAPSULE ORAL EVERY 8 HOURS SCHEDULED
Status: DISCONTINUED | OUTPATIENT
Start: 2023-04-18 | End: 2023-04-20 | Stop reason: HOSPADM

## 2023-04-17 RX ORDER — BUPRENORPHINE HYDROCHLORIDE 8 MG/1
22 TABLET SUBLINGUAL DAILY
COMMUNITY

## 2023-04-17 RX ORDER — ONDANSETRON 4 MG/1
4 TABLET, ORALLY DISINTEGRATING ORAL EVERY 12 HOURS PRN
Status: DISCONTINUED | OUTPATIENT
Start: 2023-04-17 | End: 2023-04-20 | Stop reason: HOSPADM

## 2023-04-17 RX ORDER — PANTOPRAZOLE SODIUM 40 MG/1
40 TABLET, DELAYED RELEASE ORAL
Status: DISCONTINUED | OUTPATIENT
Start: 2023-04-18 | End: 2023-04-20 | Stop reason: HOSPADM

## 2023-04-17 RX ORDER — CLONIDINE HYDROCHLORIDE 0.2 MG/1
0.2 TABLET ORAL 2 TIMES DAILY PRN
Status: DISCONTINUED | OUTPATIENT
Start: 2023-04-17 | End: 2023-04-20 | Stop reason: HOSPADM

## 2023-04-17 RX ORDER — HYDROXYZINE PAMOATE 50 MG/1
50 CAPSULE ORAL DAILY PRN
Status: DISCONTINUED | OUTPATIENT
Start: 2023-04-17 | End: 2023-04-20 | Stop reason: HOSPADM

## 2023-04-17 RX ORDER — CHOLECALCIFEROL (VITAMIN D3) 125 MCG
5 CAPSULE ORAL NIGHTLY
Status: DISCONTINUED | OUTPATIENT
Start: 2023-04-18 | End: 2023-04-20 | Stop reason: HOSPADM

## 2023-04-17 RX ORDER — OLANZAPINE 5 MG/1
20 TABLET, ORALLY DISINTEGRATING ORAL NIGHTLY
Status: DISCONTINUED | OUTPATIENT
Start: 2023-04-18 | End: 2023-04-20 | Stop reason: HOSPADM

## 2023-04-17 RX ORDER — SODIUM CHLORIDE 0.9 % (FLUSH) 0.9 %
10 SYRINGE (ML) INJECTION AS NEEDED
Status: DISCONTINUED | OUTPATIENT
Start: 2023-04-17 | End: 2023-04-17

## 2023-04-17 RX ORDER — SODIUM CHLORIDE 0.9 % (FLUSH) 0.9 %
10 SYRINGE (ML) INJECTION AS NEEDED
Status: DISCONTINUED | OUTPATIENT
Start: 2023-04-17 | End: 2023-04-20 | Stop reason: HOSPADM

## 2023-04-17 RX ORDER — TRAZODONE HYDROCHLORIDE 100 MG/1
300 TABLET ORAL NIGHTLY
Status: DISCONTINUED | OUTPATIENT
Start: 2023-04-18 | End: 2023-04-20 | Stop reason: HOSPADM

## 2023-04-17 RX ADMIN — QUETIAPINE FUMARATE 300 MG: 200 TABLET ORAL at 23:52

## 2023-04-17 RX ADMIN — ONDANSETRON 4 MG: 2 INJECTION INTRAMUSCULAR; INTRAVENOUS at 15:53

## 2023-04-17 RX ADMIN — PIPERACILLIN SODIUM AND TAZOBACTAM SODIUM 3.38 G: 3; .375 INJECTION, POWDER, LYOPHILIZED, FOR SOLUTION INTRAVENOUS at 15:59

## 2023-04-17 RX ADMIN — TRAZODONE HYDROCHLORIDE 300 MG: 100 TABLET ORAL at 23:51

## 2023-04-17 RX ADMIN — FENTANYL CITRATE 50 MCG: 50 INJECTION, SOLUTION INTRAMUSCULAR; INTRAVENOUS at 15:53

## 2023-04-17 RX ADMIN — Medication 10 ML: at 23:52

## 2023-04-17 RX ADMIN — SODIUM CHLORIDE 2505 ML: 9 INJECTION, SOLUTION INTRAVENOUS at 16:07

## 2023-04-17 RX ADMIN — IOPAMIDOL 100 ML: 755 INJECTION, SOLUTION INTRAVENOUS at 16:46

## 2023-04-17 NOTE — LETTER
April 20, 2023      [address] [place] [address]  [phone]          Patient: Abhijeet Pitts   YOB: 1990   Date of Visit: 4/17/2023       To Whom It May Concern:    Abhijeet Pitts was seen at [address] on 4/17/2023.    Please excuse Abhijeet Pitts from work. Discharged 4/20/2023.    Able to return with minimal lifting ___________        Sincerely,

## 2023-04-17 NOTE — ED TRIAGE NOTES
Upon looking at chest xray from urgent care that has not yet resulted. Called PA. Received ordered for KUB. Patients intestines look to be enlarged. Informed patient to stop eating and drinking upon arrival to triage room. Patient was drinking soda and seemed to be chewing on food upon triaging.

## 2023-04-17 NOTE — ED PROVIDER NOTES
Time: 3:07 PM EDT  Date of encounter:  4/17/2023  Independent Historian/Clinical History and Information was obtained by:   Patient  Chief Complaint: fever    History is limited by: N/A    History of Present Illness:  Patient is a 32 y.o. year old male with a history of Crohn's disease  presents to the emergency department for evaluation of fever.  The patient was seen earlier at the urgent care with fever, runny nose, congestion, cough.  He also had an episode of vomiting last night and has had persistent cough and complains of persistent abdominal pain.  The patient had a negative COVID and flu test at the urgent care however he was sent to the emergency room for further evaluation.  The patient was tachycardic at the urgent care.    Pt was seen earlier at  with fever. Pt says the UC says he had abnormal x-rays and to go to the ED.     Pt admits abdominal pain, fever, runny nose, congestion, and cough. .     He admits chronic diarrhea from Crohn's. Pt denies bloody or black BM's.  He sees Dr. Mason for this. Pt has a colonoscopy scheduled on 4/25/23.          Patient Care Team  Primary Care Provider: Provider, No Known    Past Medical History:     Allergies   Allergen Reactions   • Elemental Sulfur Swelling   • Fish-Derived Products Unknown - Low Severity   • Naloxone Swelling   • Shellfish-Derived Products Unknown - Low Severity   • Tylenol [Acetaminophen] Swelling     Past Medical History:   Diagnosis Date   • Crohn's disease      Past Surgical History:   Procedure Laterality Date   • COLON SURGERY     • SIGMOIDOSCOPY N/A 02/14/2023    Procedure: SIGMOIDOSCOPY FLEXIBLE;  Surgeon: Valentin Mason MD;  Location: [facility] ENDOSCOPY;  Service: Gastroenterology;  Laterality: N/A;  POOR PREP   • STOMACH SURGERY       Family History   Problem Relation Age of Onset   • Colon cancer Neg Hx        Home Medications:  Prior to Admission medications    Medication Sig Start Date End Date Taking? Authorizing Provider    cloNIDine (CATAPRES) 0.2 MG tablet Take 1 tablet by mouth 2 (Two) Times a Day As Needed.    Van Segura MD   dicyclomine (BENTYL) 20 MG tablet Take 1 tablet by mouth Every 6 (Six) Hours As Needed (abdominal pain). 3/8/23   Maverick Quintero MD   gabapentin (NEURONTIN) 600 MG tablet Take 1 tablet by mouth 3 (Three) Times a Day.    Van Segura MD   hydrOXYzine pamoate (VISTARIL) 50 MG capsule Take 1 capsule by mouth Daily.    Van Segura MD   melatonin 3 MG tablet Take 2 tablets by mouth Every Night.    Van Segura MD   OLANZapine zydis (zyPREXA) 20 MG disintegrating tablet Place 1 tablet on the tongue Every Night.    Van Segura MD   omeprazole (priLOSEC) 40 MG capsule Take 1 capsule by mouth Daily.    Van Segura MD   ondansetron ODT (ZOFRAN-ODT) 4 MG disintegrating tablet Place 1 tablet on the tongue Every 6 (Six) Hours As Needed for Nausea or Vomiting. 4/11/23   Ian Vizcaino MD   oxyCODONE (Roxicodone) 5 MG immediate release tablet Take 1 tablet by mouth Every 4 (Four) Hours As Needed for Severe Pain. 4/11/23   Ian Vizcaino MD   PEG-KCl-NaCl-NaSulf-Na Asc-C (PLENVU) 140 g reconstituted solution solution Take 140 g by mouth Take As Directed. Follow Instructions Provided from Office. 3/15/23   Radhika Wilkins APRN   predniSONE (DELTASONE) 5 MG tablet Take 8 tablets by mouth Daily for 5 days, THEN 6 tablets Daily for 5 days, THEN 4 tablets Daily for 5 days, THEN 3 tablets Daily for 5 days, THEN 2 tablets Daily for 5 days, THEN 1 tablet Daily for 5 days. 3/18/23 4/17/23  Syd Liu MD   QUEtiapine (SEROquel) 300 MG tablet Take 1 tablet by mouth Every Night.    Van Segura MD   traZODone (DESYREL) 100 MG tablet Take 3 tablets by mouth Every Night.    Van Segura MD   venlafaxine XR (EFFEXOR-XR) 75 MG 24 hr capsule Take 1 capsule by mouth Daily.    Van Segura MD        Social History:   Social History  "    Tobacco Use   • Smoking status: Every Day     Packs/day: 1.00     Years: 10.00     Pack years: 10.00     Types: Cigarettes   • Smokeless tobacco: Never   Vaping Use   • Vaping Use: Every day   Substance Use Topics   • Alcohol use: Not Currently   • Drug use: Not Currently         Review of Systems:  Review of Systems   Constitutional: Positive for fever. Negative for activity change, chills, fatigue and unexpected weight change.   HENT: Positive for congestion and rhinorrhea. Negative for sinus pressure, sore throat and trouble swallowing.    Eyes: Negative for pain, discharge, redness and visual disturbance.   Respiratory: Positive for cough. Negative for chest tightness, shortness of breath and wheezing.    Cardiovascular: Negative for chest pain and palpitations.   Gastrointestinal: Positive for abdominal pain, diarrhea, nausea and vomiting. Negative for anal bleeding and blood in stool.   Endocrine: Negative for cold intolerance and polydipsia.   Genitourinary: Negative for dysuria, frequency, hematuria and urgency.   Musculoskeletal: Positive for myalgias. Negative for arthralgias, joint swelling, neck pain and neck stiffness.   Skin: Negative for color change and rash.   Allergic/Immunologic: Negative for environmental allergies and immunocompromised state.   Neurological: Negative for dizziness, weakness and light-headedness.   Hematological: Does not bruise/bleed easily.   Psychiatric/Behavioral: Negative for agitation, confusion, dysphoric mood and suicidal ideas.        Physical Exam:  /70 (BP Location: Left arm, Patient Position: Lying)   Pulse 80   Temp 98.2 °F (36.8 °C) (Oral)   Resp 18   Ht 175.3 cm (69\")   Wt 82.4 kg (181 lb 10.5 oz)   SpO2 97%   BMI 26.83 kg/m²     Physical Exam  Vitals and nursing note reviewed.   Constitutional:       General: He is not in acute distress.     Appearance: Normal appearance. He is not toxic-appearing.   HENT:      Head: Normocephalic and atraumatic. "      Jaw: There is normal jaw occlusion.   Eyes:      General: Lids are normal.      Extraocular Movements: Extraocular movements intact.      Conjunctiva/sclera: Conjunctivae normal.      Pupils: Pupils are equal, round, and reactive to light.   Cardiovascular:      Rate and Rhythm: Regular rhythm. Tachycardia present.      Pulses: Normal pulses.      Heart sounds: Normal heart sounds.   Pulmonary:      Effort: Pulmonary effort is normal. No respiratory distress.      Breath sounds: Normal breath sounds. No wheezing or rhonchi.   Abdominal:      General: Abdomen is flat.      Palpations: Abdomen is soft.      Tenderness: There is generalized abdominal tenderness. There is no guarding or rebound.      Comments: Tympany to percussion.    Musculoskeletal:         General: Normal range of motion.      Cervical back: Normal range of motion and neck supple.      Right lower leg: No edema.      Left lower leg: No edema.   Skin:     General: Skin is warm and dry.   Neurological:      Mental Status: He is alert and oriented to person, place, and time. Mental status is at baseline.   Psychiatric:         Mood and Affect: Mood normal.                  Procedures:  Procedures      Medical Decision Making:      Comorbidities that affect care:    None    External Notes reviewed:    Previous ED Note: Pt was recently in the ED for viral gastroenteritis on 4/10/23. and Previous Radiological Studies: Pt had an x-ray at  today.       The following orders were placed and all results were independently analyzed by me:  Orders Placed This Encounter   Procedures   • Blood Culture - Blood,   • Blood Culture - Blood,   • MRSA Screen, PCR (Inpatient) - Swab, Nares   • Blood Culture - Blood,   • Blood Culture - Blood,   • XR Abdomen KUB   • CT Abdomen Pelvis With Contrast   • Bartlett Draw   • Comprehensive Metabolic Panel   • Lipase   • Urinalysis With Microscopic If Indicated (No Culture) - Urine, Clean Catch   • CBC Auto Differential   •  Ethanol   • Lactic Acid, Plasma   • STAT Lactic Acid, Reflex   • Blood Gas, Arterial -With Co-Ox Panel: Yes   • Vancomycin, Random   • Diet: Regular/House Diet; Texture: Regular Texture (IDDSI 7); Fluid Consistency: Thin (IDDSI 0)   • Undress & Gown   • Vital Signs   • Intake & Output   • Weigh Patient   • Oral Care   • Saline Lock & Maintain IV Access   • Nursing Dysphagia Screening (Complete Prior to Giving Anything By Mouth)   • RN to Place Order SLP Consult - Eval & Treat Choosing Reason of RN Dysphagia Screen Failed   • Nurse to Call MD or Nutrition Services for Diet if Patient Passes Dysphagia Screen   • Place Sequential Compression Device   • Maintain Sequential Compression Device   • Pneumonia Education   • Tobacco Cessation Education   • Notify Provider if Patient Requires Greater Than 4LPM of Oxygen   • Code Status and Medical Interventions:   • Hospitalist (on-call MD unless specified)   • Hospitalist (on-call MD unless specified)   • Oxygen Therapy- Nasal Cannula; Titrate for SPO2: 90% - 95%   • Pulse Oximetry on Admit   • Incentive Spirometry   • Insert Peripheral IV   • Insert Peripheral IV   • Inpatient Admission   • CBC & Differential   • Green Top (Gel)   • Lavender Top   • Gold Top - SST   • Light Blue Top       Medications Given in the Emergency Department:  Medications   sodium chloride 0.9 % flush 10 mL (has no administration in time range)   sodium chloride 0.9 % flush 10 mL (10 mL Intravenous Given 4/18/23 1145)   sodium chloride 0.9 % infusion 40 mL (has no administration in time range)   piperacillin-tazobactam (ZOSYN) 3.375 g/100 mL 0.9% NS IVPB (mbp) (3.375 g Intravenous New Bag 4/18/23 3355)   Pharmacy to dose vancomycin (has no administration in time range)   dicyclomine (BENTYL) tablet 20 mg (20 mg Oral Given 4/18/23 1143)   cloNIDine (CATAPRES) tablet 0.2 mg (has no administration in time range)   gabapentin (NEURONTIN) capsule 600 mg (600 mg Oral Given 4/18/23 1321)   hydrOXYzine  pamoate (VISTARIL) capsule 50 mg (50 mg Oral Given 4/18/23 1143)   melatonin tablet 5 mg (5 mg Oral Not Given 4/17/23 2351)   OLANZapine zydis (zyPREXA) disintegrating tablet 20 mg (20 mg Oral Not Given 4/17/23 2351)   pantoprazole (PROTONIX) EC tablet 40 mg (40 mg Oral Given 4/18/23 1145)   ondansetron ODT (ZOFRAN-ODT) disintegrating tablet 4 mg (has no administration in time range)   QUEtiapine (SEROquel) tablet 300 mg (300 mg Oral Given 4/17/23 2352)   traZODone (DESYREL) tablet 300 mg (300 mg Oral Given 4/17/23 2351)   venlafaxine XR (EFFEXOR-XR) 24 hr capsule 75 mg (75 mg Oral Given 4/18/23 1143)   predniSONE (DELTASONE) tablet 5 mg (5 mg Oral Given 4/18/23 1143)   vancomycin 1500 mg/250 mL 0.9% NS IVPB ([facility]) (has no administration in time range)   buprenorphine (SUBUTEX) SL tablet 8 mg (8 mg Sublingual Given 4/18/23 1244)   oxyCODONE (ROXICODONE) immediate release tablet 5 mg (5 mg Oral Given 4/18/23 1805)   sodium chloride 0.9 % bolus 2,505 mL (0 mL Intravenous Stopped 4/17/23 1707)   piperacillin-tazobactam (ZOSYN) 3.375 g/100 mL 0.9% NS IVPB (mbp) (0 g Intravenous Stopped 4/17/23 1629)   fentaNYL citrate (PF) (SUBLIMAZE) injection 50 mcg (50 mcg Intravenous Given 4/17/23 1553)   ondansetron (ZOFRAN) injection 4 mg (4 mg Intravenous Given 4/17/23 1553)   iopamidol (ISOVUE-370) 76 % injection 100 mL (100 mL Intravenous Given 4/17/23 1646)   vancomycin 1750 mg/500 mL 0.9% NS IVPB ([facility]) (1,750 mg Intravenous New Bag 4/18/23 1316)        ED Course:         Labs:    Results for orders placed or performed during the hospital encounter of 04/17/23   Blood Culture - Blood, Arm, Right    Specimen: Arm, Right; Blood   Result Value Ref Range    Blood Culture No growth at 24 hours    Blood Culture - Blood, Arm, Left    Specimen: Arm, Left; Blood   Result Value Ref Range    Blood Culture No growth at 24 hours    MRSA Screen, PCR (Inpatient) - Swab, Nares    Specimen: Nares; Swab   Result Value Ref Range    MRSA PCR No  MRSA Detected No MRSA Detected   Comprehensive Metabolic Panel    Specimen: Blood   Result Value Ref Range    Glucose 120 (H) 65 - 99 mg/dL    BUN 14 6 - 20 mg/dL    Creatinine 0.83 0.76 - 1.27 mg/dL    Sodium 138 136 - 145 mmol/L    Potassium 3.5 3.5 - 5.2 mmol/L    Chloride 106 98 - 107 mmol/L    CO2 20.4 (L) 22.0 - 29.0 mmol/L    Calcium 7.2 (L) 8.6 - 10.5 mg/dL    Total Protein 4.4 (L) 6.0 - 8.5 g/dL    Albumin 2.3 (L) 3.5 - 5.2 g/dL    ALT (SGPT) 9 1 - 41 U/L    AST (SGOT) 10 1 - 40 U/L    Alkaline Phosphatase 100 39 - 117 U/L    Total Bilirubin 0.3 0.0 - 1.2 mg/dL    Globulin 2.1 gm/dL    A/G Ratio 1.1 g/dL    BUN/Creatinine Ratio 16.9 7.0 - 25.0    Anion Gap 11.6 5.0 - 15.0 mmol/L    eGFR 117.8 >60.0 mL/min/1.73   Lipase    Specimen: Blood   Result Value Ref Range    Lipase 5 (L) 13 - 60 U/L   Urinalysis With Microscopic If Indicated (No Culture) - Urine, Clean Catch    Specimen: Urine, Clean Catch   Result Value Ref Range    Color, UA Yellow Yellow, Straw    Appearance, UA Clear Clear    pH, UA 6.5 5.0 - 8.0    Specific Gravity, UA >1.030 (H) 1.005 - 1.030    Glucose, UA Negative Negative    Ketones, UA Negative Negative    Bilirubin, UA Negative Negative    Blood, UA Negative Negative    Protein, UA Negative Negative    Leuk Esterase, UA Negative Negative    Nitrite, UA Negative Negative    Urobilinogen, UA 2.0 E.U./dL (A) 0.2 - 1.0 E.U./dL   CBC Auto Differential    Specimen: Blood   Result Value Ref Range    WBC 8.41 3.40 - 10.80 10*3/mm3    RBC 3.19 (L) 4.14 - 5.80 10*6/mm3    Hemoglobin 10.6 (L) 13.0 - 17.7 g/dL    Hematocrit 31.3 (L) 37.5 - 51.0 %    MCV 98.1 (H) 79.0 - 97.0 fL    MCH 33.2 (H) 26.6 - 33.0 pg    MCHC 33.9 31.5 - 35.7 g/dL    RDW 15.8 (H) 12.3 - 15.4 %    RDW-SD 57.1 (H) 37.0 - 54.0 fl    MPV 9.3 6.0 - 12.0 fL    Platelets 341 140 - 450 10*3/mm3    Neutrophil % 83.1 (H) 42.7 - 76.0 %    Lymphocyte % 8.4 (L) 19.6 - 45.3 %    Monocyte % 7.8 5.0 - 12.0 %    Eosinophil % 0.0 (L) 0.3 - 6.2 %     Basophil % 0.2 0.0 - 1.5 %    Immature Grans % 0.5 0.0 - 0.5 %    Neutrophils, Absolute 6.98 1.70 - 7.00 10*3/mm3    Lymphocytes, Absolute 0.71 0.70 - 3.10 10*3/mm3    Monocytes, Absolute 0.66 0.10 - 0.90 10*3/mm3    Eosinophils, Absolute 0.00 0.00 - 0.40 10*3/mm3    Basophils, Absolute 0.02 0.00 - 0.20 10*3/mm3    Immature Grans, Absolute 0.04 0.00 - 0.05 10*3/mm3    nRBC 0.0 0.0 - 0.2 /100 WBC   Ethanol    Specimen: Blood   Result Value Ref Range    Ethanol <10 0 - 10 mg/dL    Ethanol % <0.010 %   Lactic Acid, Plasma    Specimen: Blood   Result Value Ref Range    Lactate 2.5 (C) 0.5 - 2.0 mmol/L   STAT Lactic Acid, Reflex    Specimen: Blood   Result Value Ref Range    Lactate 2.1 (C) 0.5 - 2.0 mmol/L   Green Top (Gel)   Result Value Ref Range    Extra Tube Hold for add-ons.    Lavender Top   Result Value Ref Range    Extra Tube hold for add-on    Gold Top - SST   Result Value Ref Range    Extra Tube Hold for add-ons.    Light Blue Top   Result Value Ref Range    Extra Tube Hold for add-ons.          Lab Results (last 24 hours)     Procedure Component Value Units Date/Time    Blood Culture - Blood, Arm, Left [[id number]] Collected: 04/18/23 1105    Specimen: Blood from Arm, Left Updated: 04/18/23 1114    Blood Culture - Blood, Arm, Right [[id number]] Collected: 04/18/23 1105    Specimen: Blood from Arm, Right Updated: 04/18/23 1113    MRSA Screen, PCR (Inpatient) - Swab, Nares [[id number]]  (Normal) Collected: 04/18/23 1447    Specimen: Swab from Nares Updated: 04/18/23 1628     MRSA PCR No MRSA Detected    Narrative:      The negative predictive value of this diagnostic test is high and should only be used to consider de-escalating anti-MRSA therapy. A positive result may indicate colonization with MRSA and must be correlated clinically.           Imaging:    XR Chest 2 View    Result Date: 4/17/2023  Narrative: PROCEDURE: XR CHEST 2 VW  COMPARISON:.[facility], CT, CT ABDOMEN PELVIS W CONTRAST,  4/10/2023, 22:39.  INDICATIONS: fever, cough, shortness of breath  FINDINGS:  There is hypoinflation of the lungs with streaky bibasilar scarring versus atelectasis.  No pneumothorax or pleural effusion.  No focal lung consolidation.  Heart size is normal.  Pulmonary vasculature is within normal limits.  No acute osseous abnormalities.      Impression:  Hypoinflated lungs with bibasilar atelectasis.     PALAK LESLIE MD       Electronically Signed and Approved By: PALAK LESLIE MD on 4/17/2023 at 14:22             CT Abdomen Pelvis With Contrast    Result Date: 4/17/2023  Narrative: PROCEDURE: CT ABDOMEN PELVIS W CONTRAST  COMPARISON: [facility], CR, XR ABDOMEN KUB, 4/17/2023, 14:05.  [facility], CR, XR CHEST 2 VW, 4/17/2023, 12:55.  [facility], CT, CT ABDOMEN PELVIS W CONTRAST, 3/15/2023, 15:42.  [facility], CT, CT ABDOMEN PELVIS W CONTRAST, 1/20/2023, 12:22.  [facility], CT, CT ABDOMEN PELVIS W CONTRAST, 4/10/2023, 22:39.  INDICATIONS: Abdominal pain and fever  TECHNIQUE: After obtaining the patient's consent, CT images were created with non-ionic intravenous contrast material.   PROTOCOL:   Standard imaging protocol performed    RADIATION:   DLP: 316mGy*cm   Automated exposure control was utilized to minimize radiation dose. CONTRAST: 100cc Isovue 370 I.V.  FINDINGS:  Tree-in-bud nodular ground-glass opacities in the lung bases, right more extensive than left and patchy consolidative opacities in the lung bases, also right greater than left, are new compared to 4/10/2023 CT.  No pleural effusion.  Normal heart size.  Liver, gallbladder, pancreas, spleen, adrenal glands, and kidneys appear within normal limits.  There is diffuse abnormal distention small bowel and colon, as seen on previous CTs.  Small bowel distention with air-fluid levels in the left upper quadrant has increased compared to 4/10/2023 CT.  Mildly enlarged mesenteric lymph  nodes are stable.  No free intraperitoneal air fluid is noted.  No acute osseous abnormality is seen.  Stable sclerosis in the left femoral head is consistent with avascular necrosis.      Impression:   1. Diffuse distention of small bowel and colon in a pattern similar compared to previous CTs.  Small bowel distention has increased in the left upper quadrant compared to 4/10/2023 CT. 2. Stable mildly enlarged mesenteric lymph nodes. 3. New consolidative and ground-glass opacities in the included lung bases, concerning for infection, possibly aspiration.     AGUEDA FELDMAN MD       Electronically Signed and Approved By: AGUEDA FELDMAN MD on 4/17/2023 at 17:05             CT Abdomen Pelvis With Contrast    Result Date: 4/10/2023  Narrative: PROCEDURE: CT ABDOMEN PELVIS W CONTRAST  COMPARISONS: 3/15/2023; 1/20/2023.  INDICATIONS: GENERALIZED ABDOMINAL PAIN. NAUSEA & VOMITING. HISTORY OF CROHN'S DISEASE.  TECHNIQUE: After obtaining the patient's consent, 596 CT images were created with non-ionic intravenous contrast material.  No oral contrast agent was administered for the study  PROTOCOL:   Standard CT imaging protocol performed.    RADIATION:   Total DLP: 491 mGy*cm.   Automated exposure control was utilized to minimize radiation dose. CONTRAST: 85 mL Isovue 370 I.V. LABS:   eGFR: >60 mL/min/1.73m^2  FINDINGS: Extensive fluid-filled bowel is identified, especially involving small bowel but also colon.  The findings may represent a generalized adynamic ileus.  A distal small bowel obstruction cannot be excluded but is probably less likely.  The distal ileum has a thickened appearance, which may be related to the patient's known Crohn's disease.  Postoperative changes are suggested in the right lower quadrant involving cecum and/or distal small bowel.  Please correlate with the surgical history.  No pneumoperitoneum or pneumatosis is seen.  No pericolonic or perienteric fluid collections are seen to suggest  abscess.  There are small to moderate sized mesenteric lymph nodes which are nonspecific.  They may be reactive in nature.  There is distension of the gallbladder, which is new since the prior 3/15/2023 study.  No gallstones are seen.  No acute cholecystitis.  No acute pancreatitis.  The previously seen airspace disease in the lung bases, greater on the right than the left, has decreased with minimal subsegmental atelectasis persisting.  No definite venous thrombosis is seen.  No renal/ureteral stones or hydronephrosis or obstructive uropathy.  No acute pyelonephritis.  Pelvic phleboliths are seen.  No urinary bladder calculi are identified.  No acute fracture or aggressive osseous lesion is suggested.  Otherwise, no acute findings are seen, and no significant interval change is appreciated since the prior CT studies.      Impression:   1. Interval improvement in the airspace disease in the lung bases, especially on the right, since 3/15/2023.  Mild subsegmental atelectasis is thought to persist in the lung bases.  No definite acute infiltrate is seen.  2. Dilated bowel persists, including small bowel and colon, with narrowing and mural thickening of the distal ileum.  The findings may be related to acute exacerbation of the patient's known Crohn's enteritis possible partial or complete distal mechanical small bowel obstruction versus a generalized adynamic ileus.  Similar findings were seen on the 1/20/2023 CT study.  The 3/15/2023 study was a especially obscured in the right lower quadrant by motion artifact.  No pneumoperitoneum or pneumatosis is seen.  No portal or mesenteric venous gas is seen to suggest ischemic enteritis No ascites.  No definite abscess or fistula is appreciated.  3. No other definite acute findings are appreciated.  4. Please see above comments for further detail. 1.   Please note that portions of this note were completed with a voice recognition program.  SRINIVASA NOEL JR, MD        Electronically Signed and Approved By: SRINIVASA NOEL JR, MD on 4/10/2023 at 23:52              XR Abdomen KUB    Result Date: 4/17/2023  Narrative: PROCEDURE: XR ABDOMEN KUB  COMPARISON: [facility], CT, CT ABDOMEN PELVIS W CONTRAST, 4/10/2023, 22:39.  INDICATIONS: abdominal pain  FINDINGS:  Bowel gas pattern remains abnormal with dilatation of small bowel and colon filling the abdomen.  This is a little more prominent in the left side of the abdomen.  No obvious gas is identified in the rectal vault.      Impression:  Continued abnormal bowel gas pattern with an appearance more suggestive of ileus than obstruction although obstruction is in the differential diagnosis.     Donnie Nova MD       Electronically Signed and Approved By: Donnie Nova MD on 4/17/2023 at 14:16                 XR Chest 2 View    Result Date: 4/17/2023  Narrative: PROCEDURE: XR CHEST 2 VW  COMPARISON:.[facility], CT, CT ABDOMEN PELVIS W CONTRAST, 4/10/2023, 22:39.  INDICATIONS: fever, cough, shortness of breath  FINDINGS:  There is hypoinflation of the lungs with streaky bibasilar scarring versus atelectasis.  No pneumothorax or pleural effusion.  No focal lung consolidation.  Heart size is normal.  Pulmonary vasculature is within normal limits.  No acute osseous abnormalities.      Impression:  Hypoinflated lungs with bibasilar atelectasis.     PALAK LESLIE MD       Electronically Signed and Approved By: PALAK LESLIE MD on 4/17/2023 at 14:22             CT Abdomen Pelvis With Contrast    Result Date: 4/17/2023  Narrative: PROCEDURE: CT ABDOMEN PELVIS W CONTRAST  COMPARISON: [facility], CR, XR ABDOMEN KUB, 4/17/2023, 14:05.  [facility], CR, XR CHEST 2 VW, 4/17/2023, 12:55.  [facility], CT, CT ABDOMEN PELVIS W CONTRAST, 3/15/2023, 15:42.  [facility], CT, CT ABDOMEN PELVIS W CONTRAST, 1/20/2023, 12:22.  [facility], CT, CT ABDOMEN PELVIS W  CONTRAST, 4/10/2023, 22:39.  INDICATIONS: Abdominal pain and fever  TECHNIQUE: After obtaining the patient's consent, CT images were created with non-ionic intravenous contrast material.   PROTOCOL:   Standard imaging protocol performed    RADIATION:   DLP: 316mGy*cm   Automated exposure control was utilized to minimize radiation dose. CONTRAST: 100cc Isovue 370 I.V.  FINDINGS:  Tree-in-bud nodular ground-glass opacities in the lung bases, right more extensive than left and patchy consolidative opacities in the lung bases, also right greater than left, are new compared to 4/10/2023 CT.  No pleural effusion.  Normal heart size.  Liver, gallbladder, pancreas, spleen, adrenal glands, and kidneys appear within normal limits.  There is diffuse abnormal distention small bowel and colon, as seen on previous CTs.  Small bowel distention with air-fluid levels in the left upper quadrant has increased compared to 4/10/2023 CT.  Mildly enlarged mesenteric lymph nodes are stable.  No free intraperitoneal air fluid is noted.  No acute osseous abnormality is seen.  Stable sclerosis in the left femoral head is consistent with avascular necrosis.      Impression:   1. Diffuse distention of small bowel and colon in a pattern similar compared to previous CTs.  Small bowel distention has increased in the left upper quadrant compared to 4/10/2023 CT. 2. Stable mildly enlarged mesenteric lymph nodes. 3. New consolidative and ground-glass opacities in the included lung bases, concerning for infection, possibly aspiration.     AGUEDA FELDMAN MD       Electronically Signed and Approved By: AGUEDA FELDMAN MD on 4/17/2023 at 17:05             CT Abdomen Pelvis With Contrast    Result Date: 4/10/2023  Narrative: PROCEDURE: CT ABDOMEN PELVIS W CONTRAST  COMPARISONS: 3/15/2023; 1/20/2023.  INDICATIONS: GENERALIZED ABDOMINAL PAIN. NAUSEA & VOMITING. HISTORY OF CROHN'S DISEASE.  TECHNIQUE: After obtaining the patient's consent, 596 CT images  were created with non-ionic intravenous contrast material.  No oral contrast agent was administered for the study  PROTOCOL:   Standard CT imaging protocol performed.    RADIATION:   Total DLP: 491 mGy*cm.   Automated exposure control was utilized to minimize radiation dose. CONTRAST: 85 mL Isovue 370 I.V. LABS:   eGFR: >60 mL/min/1.73m^2  FINDINGS: Extensive fluid-filled bowel is identified, especially involving small bowel but also colon.  The findings may represent a generalized adynamic ileus.  A distal small bowel obstruction cannot be excluded but is probably less likely.  The distal ileum has a thickened appearance, which may be related to the patient's known Crohn's disease.  Postoperative changes are suggested in the right lower quadrant involving cecum and/or distal small bowel.  Please correlate with the surgical history.  No pneumoperitoneum or pneumatosis is seen.  No pericolonic or perienteric fluid collections are seen to suggest abscess.  There are small to moderate sized mesenteric lymph nodes which are nonspecific.  They may be reactive in nature.  There is distension of the gallbladder, which is new since the prior 3/15/2023 study.  No gallstones are seen.  No acute cholecystitis.  No acute pancreatitis.  The previously seen airspace disease in the lung bases, greater on the right than the left, has decreased with minimal subsegmental atelectasis persisting.  No definite venous thrombosis is seen.  No renal/ureteral stones or hydronephrosis or obstructive uropathy.  No acute pyelonephritis.  Pelvic phleboliths are seen.  No urinary bladder calculi are identified.  No acute fracture or aggressive osseous lesion is suggested.  Otherwise, no acute findings are seen, and no significant interval change is appreciated since the prior CT studies.      Impression:   1. Interval improvement in the airspace disease in the lung bases, especially on the right, since 3/15/2023.  Mild subsegmental atelectasis  is thought to persist in the lung bases.  No definite acute infiltrate is seen.  2. Dilated bowel persists, including small bowel and colon, with narrowing and mural thickening of the distal ileum.  The findings may be related to acute exacerbation of the patient's known Crohn's enteritis possible partial or complete distal mechanical small bowel obstruction versus a generalized adynamic ileus.  Similar findings were seen on the 1/20/2023 CT study.  The 3/15/2023 study was a especially obscured in the right lower quadrant by motion artifact.  No pneumoperitoneum or pneumatosis is seen.  No portal or mesenteric venous gas is seen to suggest ischemic enteritis No ascites.  No definite abscess or fistula is appreciated.  3. No other definite acute findings are appreciated.  4. Please see above comments for further detail. 1.   Please note that portions of this note were completed with a voice recognition program.  SRINIVASA NOEL JR, MD       Electronically Signed and Approved By: SRINIVASA NOEL JR, MD on 4/10/2023 at 23:52              XR Abdomen KUB    Result Date: 4/17/2023  Narrative: PROCEDURE: XR ABDOMEN KUB  COMPARISON: [facility], CT, CT ABDOMEN PELVIS W CONTRAST, 4/10/2023, 22:39.  INDICATIONS: abdominal pain  FINDINGS:  Bowel gas pattern remains abnormal with dilatation of small bowel and colon filling the abdomen.  This is a little more prominent in the left side of the abdomen.  No obvious gas is identified in the rectal vault.      Impression:  Continued abnormal bowel gas pattern with an appearance more suggestive of ileus than obstruction although obstruction is in the differential diagnosis.     Donnie Nova MD       Electronically Signed and Approved By: Donnie Nova MD on 4/17/2023 at 14:16                 No Radiology Exams Resulted Within Past 24 Hours      Differential Diagnosis and Discussion:    Fever: Based on the complaint of fever, differential diagnosis includes but is not  limited to meningitis, pneumonia, pyelonephritis, acute uti,  systemic immune response syndrome, sepsis, viral syndrome, fungal infection, tick born illness and other bacterial infections.    All labs were reviewed and interpreted by me.    MDM  Number of Diagnoses or Management Options  Pneumonia of both lungs due to infectious organism, unspecified part of lung  Diagnosis management comments: 15:10 - Sepsis was noted. Broad spectrum antibiotics and IV fluid bolus was ordered and started.        Amount and/or Complexity of Data Reviewed  Clinical lab tests: reviewed  Tests in the radiology section of CPT®: reviewed         Critical Care Note: Total Critical Care time of 45 minutes. Total critical care time documented does not include time spent on separately billed procedures for services of nurses or physician assistants. I personally saw and examined the patient. I have reviewed all diagnostic interpretations and treatment plans as written. I was present for the key portions of any procedures performed and the inclusive time noted in any critical care statement. Critical care time includes patient management by me, time spent at the patients bedside,  time to review lab and imaging results, discussing patient care, documentation in the medical record, and time spent with family or caregiver.    Patient Care Considerations:    CT CHEST: I considered ordering a CT scan of the chest, however the patient already has a Chest X ray and CT reveals part of pulmonary status      Consultants/Shared Management Plan:    Hospitalist: I have discussed the case with hospitalist who agrees to accept the patient for admission.    Social Determinants of Health:    Patient is independent, reliable, and has access to care.       Disposition and Care Coordination:    Admit:   Through independent evaluation of the patient's history, physical, and imperical data, the patient meets criteria for observation/admission to the  hospital.        Final diagnoses:   Pneumonia of both lungs due to infectious organism, unspecified part of lung   Sepsis, due to unspecified organism, unspecified whether acute organ dysfunction present        ED Disposition     ED Disposition   Decision to Admit    Condition   --    Comment   Level of Care: Med/Surg [1]   Diagnosis: Sepsis [[id number]]   Admitting Physician: ASHELY LYONS [[id number]]   Attending Physician: ASHELY LYONS [[id number]]   Isolate for COVID?: No [0]   Certification: I Certify That Inpatient Hospital Services Are Medically Necessary For Greater Than 2 Midnights               This medical record created using voice recognition software.      Documentation assistance provided by Bret Esquivel acting as scribe for Feliz Luz DO. Information recorded by the scribe was done at my direction and has been verified and validated by me.        Bret Esquivel  04/17/23 1522       Feliz Luz DO  04/18/23 2045

## 2023-04-18 LAB — MRSA DNA SPEC QL NAA+PROBE: NORMAL

## 2023-04-18 PROCEDURE — 25010000002 PIPERACILLIN SOD-TAZOBACTAM PER 1 G: Performed by: FAMILY MEDICINE

## 2023-04-18 PROCEDURE — 99232 SBSQ HOSP IP/OBS MODERATE 35: CPT | Performed by: FAMILY MEDICINE

## 2023-04-18 PROCEDURE — 63710000001 PREDNISONE PER 5 MG

## 2023-04-18 PROCEDURE — 87040 BLOOD CULTURE FOR BACTERIA: CPT | Performed by: FAMILY MEDICINE

## 2023-04-18 PROCEDURE — 25010000002 VANCOMYCIN 5 G RECONSTITUTED SOLUTION: Performed by: FAMILY MEDICINE

## 2023-04-18 PROCEDURE — 94799 UNLISTED PULMONARY SVC/PX: CPT

## 2023-04-18 PROCEDURE — 87641 MR-STAPH DNA AMP PROBE: CPT | Performed by: FAMILY MEDICINE

## 2023-04-18 RX ORDER — OXYCODONE HYDROCHLORIDE 5 MG/1
5 TABLET ORAL EVERY 4 HOURS PRN
Status: DISCONTINUED | OUTPATIENT
Start: 2023-04-18 | End: 2023-04-20 | Stop reason: HOSPADM

## 2023-04-18 RX ORDER — BUPRENORPHINE HYDROCHLORIDE 8 MG/1
8 TABLET SUBLINGUAL DAILY
Status: DISCONTINUED | OUTPATIENT
Start: 2023-04-18 | End: 2023-04-20 | Stop reason: HOSPADM

## 2023-04-18 RX ADMIN — PANTOPRAZOLE SODIUM 40 MG: 40 TABLET, DELAYED RELEASE ORAL at 11:45

## 2023-04-18 RX ADMIN — OXYCODONE HYDROCHLORIDE 5 MG: 5 TABLET ORAL at 11:43

## 2023-04-18 RX ADMIN — HYDROXYZINE PAMOATE 50 MG: 50 CAPSULE ORAL at 11:43

## 2023-04-18 RX ADMIN — GABAPENTIN 600 MG: 300 CAPSULE ORAL at 22:05

## 2023-04-18 RX ADMIN — GABAPENTIN 600 MG: 300 CAPSULE ORAL at 13:21

## 2023-04-18 RX ADMIN — BUPRENORPHINE 8 MG: 8 TABLET SUBLINGUAL at 12:44

## 2023-04-18 RX ADMIN — OLANZAPINE 20 MG: 5 TABLET, ORALLY DISINTEGRATING ORAL at 22:05

## 2023-04-18 RX ADMIN — PIPERACILLIN SODIUM AND TAZOBACTAM SODIUM 3.38 G: 3; .375 INJECTION, POWDER, LYOPHILIZED, FOR SOLUTION INTRAVENOUS at 23:58

## 2023-04-18 RX ADMIN — VENLAFAXINE HYDROCHLORIDE 75 MG: 75 CAPSULE, EXTENDED RELEASE ORAL at 11:43

## 2023-04-18 RX ADMIN — Medication 10 ML: at 22:06

## 2023-04-18 RX ADMIN — OXYCODONE HYDROCHLORIDE 5 MG: 5 TABLET ORAL at 22:05

## 2023-04-18 RX ADMIN — TRAZODONE HYDROCHLORIDE 300 MG: 100 TABLET ORAL at 22:06

## 2023-04-18 RX ADMIN — Medication 10 ML: at 11:45

## 2023-04-18 RX ADMIN — PIPERACILLIN SODIUM AND TAZOBACTAM SODIUM 3.38 G: 3; .375 INJECTION, POWDER, LYOPHILIZED, FOR SOLUTION INTRAVENOUS at 15:55

## 2023-04-18 RX ADMIN — OXYCODONE HYDROCHLORIDE 5 MG: 5 TABLET ORAL at 18:05

## 2023-04-18 RX ADMIN — QUETIAPINE FUMARATE 300 MG: 200 TABLET ORAL at 22:05

## 2023-04-18 RX ADMIN — PREDNISONE 5 MG: 5 TABLET ORAL at 11:43

## 2023-04-18 RX ADMIN — DICYCLOMINE HYDROCHLORIDE 20 MG: 20 TABLET ORAL at 11:43

## 2023-04-18 RX ADMIN — VANCOMYCIN HYDROCHLORIDE 1750 MG: 5 INJECTION, POWDER, LYOPHILIZED, FOR SOLUTION INTRAVENOUS at 13:16

## 2023-04-18 RX ADMIN — VANCOMYCIN HYDROCHLORIDE 1500 MG: 5 INJECTION, POWDER, LYOPHILIZED, FOR SOLUTION INTRAVENOUS at 22:06

## 2023-04-18 RX ADMIN — Medication 5 MG: at 22:05

## 2023-04-18 NOTE — PROGRESS NOTES
"[facility] Clinical Pharmacy Services: Vancomycin Pharmacokinetic Initial Consult Note    Abhijeet Pitts is a 32 y.o. male who is on day 1 of pharmacy to dose vancomycin for Pneumonia, Sepsis, and Hospital acquired pneumonia  .    Consult Information  Consulting Provider: Frank Miller   Planned Duration of Therapy: 7 days   Was Patient Receiving Prior to Admission/Consult?: No  Loading Dose Ordered or Given:1750 mg on 23 at 2230  PK/PD Target: -600 mg/L.hr   Relevant ID History:   Other Antimicrobials: Piperacillin/Tazobactam    Imaging Reviewed?: Yes    Microbiology Data  MRSA PCR performed: 23; Result: Pending  Culture/Source:       Vitals/Labs  Ht: 175.3 cm (69\"); Wt: 82.4 kg (181 lb 10.5 oz)  Temp (24hrs), Av.6 °F (37.6 °C), Min:98.2 °F (36.8 °C), Max:101.8 °F (38.8 °C)   Estimated Creatinine Clearance: 146.2 mL/min (by C-G formula based on SCr of 0.83 mg/dL).        Results from last 7 days   Lab Units 23  1434   CREATININE mg/dL 0.83   WBC 10*3/mm3 8.41     Assessment/Plan:    Vancomycin Dose:  1500 mg IV every 12 hours; which provides the following predicted parameters:  Exposure target: AUC24 (range)400-600 mg/L.hr   AUC24,ss: 569 mg/L.hr  PAUC*: 82 %  Ctrough,ss: 16.6 mg/L  Pconc*: 37 %  Tox.: 12 %    Vanc Random ordered for 23 at 0600  Patient has order for Basic Metabolic Panel    Pharmacy will follow patient's kidney function and will adjust doses and obtain levels as necessary. Thank you for involving pharmacy in this patient's care. Please contact pharmacy with any questions or concerns.                           Leighton Barbour [facility]  Clinical Pharmacist   "

## 2023-04-18 NOTE — NURSING NOTE
Called to patient room per Phlebotomy with report that patient was refusing labs for the second time this shift/since being admitted last night. Lab techs stated that pt told them not to return to his room-that he refused to have any lab work drawn.   Entered room and introduced self as charge RN. Pt would not open eyes or respond with anything more than a grunt. Explained the need for labs to be drawn and explained txs ordered and POC. Asked patient if he understood and he would not respond.   Primary RN offered to fill out AMA papers if patient does not want care and wishes to leave.   Patient denied to that RN that he wanted to leave, but continued to refuse care. Requesting opiods and refusing ordered Bentyl for pain.

## 2023-04-18 NOTE — PROGRESS NOTES
" [facility]   Hospitalist Progress Note  Date: 2023  Patient Name: Abhijeet Pitts  : 1990  MRN: [id number]  Date of admission: 2023      Subjective   Subjective     Summary: 32 y.o. male brought to the emergency department for evaluation of fever.  Patient was previously seen earlier today at urgent care for fever, runny nose, cough, congestion.  Denies having a productive cough.  States his last episode of vomiting was last night, the persistent cough has remained.  Patient reports chronic abdominal pain for which he takes home Roxicodone, unchanged during the interim.  Patient had a negative flu and COVID test at the urgent care.  Abdominal x-rays at the urgent care were reported \"abnormal, you need to be sent to the ER, and subsequently presented for evaluation.  Patient follows with Dr. Mason, gastroenterology, for Crohn's disease.  He is got a colonoscopy scheduled .  Patient was recently admitted to our facility approximately 4 weeks ago, review of those notes revealed possible aspiration pneumonitis.    Interval Followup: patient feeling a lot better since admission.  Shortness of breath improved.  Abdominal pain at baseline.      Objective   Objective     Vitals:   Temp:  [98.1 °F (36.7 °C)-98.3 °F (36.8 °C)] 98.1 °F (36.7 °C)  Heart Rate:  [] 82  Resp:  [16-18] 18  BP: (102-130)/(52-90) 109/67  Physical Exam    Constitutional: Awake, alert, no acute distress   Respiratory: rales to auscultation bilaterally, nonlabored respirations    Cardiovascular: RRR, no murmurs, rubs, or gallops, palpable pedal pulses bilaterally   Gastrointestinal: Positive bowel sounds, soft, nontender, nondistended   Musculoskeletal: No bilateral ankle edema, no clubbing or cyanosis to extremities   Psychiatric: Appropriate affect, cooperative   Neurologic: Oriented x 3, speech clear   Skin: No rashes         Assessment & Plan   Assessment / Plan     Assessment/Plan:  • Severe sepsis secondary to " healthcare associated pneumonia-patient meets sepsis criteria with fever, tachycardia, source of infection is pneumonia.  Patient was recently hospitalized approximately 4 weeks ago.  Initial lactic acid 2.5.  Patient is chronically immunosuppressed secondary to daily prednisone administration due to Crohn's disease.    • Chronic abdominal pain secondary to Crohn's disease  • Crohn's disease.  No Crohn's flare at this time.    • Immunocompromise state secondary to daily prednisone  • Anemia of chronic disease-patient       Plan  • Continue broad-spectrum antibiotics, IV Zosyn, IV vancomycin.    • 30 mL/kg of IV fluids were administered in the emergency department, continue to monitor blood pressures while on the medical floor.    • Cultures were obtained in the ED. So far negative  • Continue home prescribed oxycodone and Suboxone.  • Is to follow-up with gastroenterology in 1 week for repeat colonoscopy.    • No indication for transfusion at this time.    Discussed plan with RN.    DVT prophylaxis:  Mechanical DVT prophylaxis orders are present.    CODE STATUS:   Level Of Support Discussed With: Patient  Code Status (Patient has no pulse and is not breathing): CPR (Attempt to Resuscitate)  Medical Interventions (Patient has pulse or is breathing): Full Support

## 2023-04-18 NOTE — PLAN OF CARE
Goal Outcome Evaluation:            Outcome Evaluation: Vital signs stable. Patient refused blood cutlures, antibiotics and medications this am. Pt stated he wants all his home medications reordered including Oxycodone and Subutex and then he will do everything else. Zosyn and Vancomycin  and other PO meds were administered late because of this. Will continue to assess.

## 2023-04-18 NOTE — PROGRESS NOTES
"[facility] Clinical Pharmacy Services: Vancomycin Monitoring Note    Abhijeet Pitts is a 32 y.o. male who is on day  of pharmacy to dose vancomycin for Pneumonia, Sepsis, and HAP .    Previous Vancomycin Dose:   1500 mg IV every  12  hours  Imaging Reviewed?: Yes  Updated Cultures and Sensitivities:   Microbiology Results (last 10 days)       ** No results found for the last 240 hours. **          Vitals/Labs  Ht: 175.3 cm (69\"); Wt: 82.4 kg (181 lb 10.5 oz)     Temp (24hrs), Av.3 °F (37.4 °C), Min:98.2 °F (36.8 °C), Max:101.8 °F (38.8 °C)    Estimated Creatinine Clearance: 146.2 mL/min (by C-G formula based on SCr of 0.83 mg/dL).       Results from last 7 days   Lab Units 23  1434   CREATININE mg/dL 0.83   WBC 10*3/mm3 8.41     Assessment/Plan    Current Vancomycin Dose:  1500 mg IV every 12 hours; which provides the following predicted parameters:  AUC24,ss: 565 mg/L.hr  PAUC*: 83 %  Ctrough,ss: 16.5 mg/L  Pconc*: 36 %  Tox.: 12 %  Loading dose was not given due to patient / family refusal. Will reorder loading dose for now and reschedule the maintenance dose.  Next Vanc Random ordered for tomorrow at 0600  We will continue to monitor patient changes and renal function     Thank you for involving pharmacy in this patient's care. Please contact pharmacy with any questions or concerns.    Adali Gunter  Clinical Pharmacist    "

## 2023-04-18 NOTE — SIGNIFICANT NOTE
04/17/23 2012   Living Environment   People in Home other relative(s)   Name(s) of People in Home Uncle, pt declined to provide name   Current Living Arrangements home   Primary Care Provided by self   Provides Primary Care For no one   Family Caregiver if Needed other (see comments)  (unknown, pt was unwilling to discuss)   Quality of Family Relationships helpful   Able to Return to Prior Arrangements yes   Living Arrangement Comments with his Uncle   Resource/Environmental Concerns   Transportation Concerns none   Transition Planning   Patient/Family Anticipates Transition to home   Patient/Family Anticipated Services at Transition other (see comments)  (unknown, pt was unwilling to discuss)   Transportation Anticipated other (see comments)  (unk, pt was very disgruntled)   Discharge Needs Assessment   Readmission Within the Last 30 Days other (see comments)  (pt was here 3/15/23)   Equipment Currently Used at Home other (see comments)  (unk, pt was very disgruntled, did not like questioning)   Concerns to be Addressed other (see comments)  (unk, pt was very disgruntled, needs could not be discussed)   Anticipated Changes Related to Illness other (see comments)  (unk, pt was very disgruntled, needs could not be discussed)   Equipment Needed After Discharge none     SW student appeared bedside to pt who was a 32 year old male, he was very disgruntled, did not desire to talk with me and was alert. Pt stated that he was coming from home and planned to return home. Pt stated that he lives with his uncle, yet refused to provide his Uncle's name. At this point, the pt turned slightly hostile and his demeanor became more distant and his tone aggressive. Other than his uncle, the Pt stated that he does not have any other support system. Pt stated that he that he does not have a living will or healthcare POA. SW student was not able to ask about daily living habits, his answers became more cold and harsher with just  "\"no\" answers. Pt stated that his uncle does the driving and I would assume many other daily activities for him at this time. Pt stated that he is not on dialysis, has no PCP, and [facility]. Pt stated that he does not use any medical equipment at home, does not need any assistance with medication, and does not anticipate needing any equipment, assistance, or services at home upon his discharge. Pt was last admitted on 3/15/2023 into the hospital.  "

## 2023-04-18 NOTE — PLAN OF CARE
"Goal Outcome Evaluation:  Plan of Care Reviewed With: patient        Progress: no change  Outcome Evaluation: Admitted from ED last night. VSS upon arrival on floor. Pt was cooperative in answering admission questions. Pt requested to have all home meds ordered, messaged ADDISON Andujar. PA ordered night time meds. Pt refused to take meds that were ordered. Pt stated \"I am not taking those medicine until I have all my home meds including the oxycodone and subutex.\" Notified PA. Pt also refused to have blood cultures collected because pt was upset he did not have all his meds ordered. Educated the pt we needed the cultures in order to start treatment. Pt stated he is not going to take antibiotics, get his labs drawn, also refused vitals. Offered pt to sign AMA paper if he keeps refusing treatment, pt stated I am not leaving. I am going to stay.  "

## 2023-04-19 PROCEDURE — 94799 UNLISTED PULMONARY SVC/PX: CPT

## 2023-04-19 PROCEDURE — 25010000002 VANCOMYCIN 5 G RECONSTITUTED SOLUTION: Performed by: FAMILY MEDICINE

## 2023-04-19 PROCEDURE — 25010000002 PIPERACILLIN SOD-TAZOBACTAM PER 1 G: Performed by: FAMILY MEDICINE

## 2023-04-19 PROCEDURE — 99232 SBSQ HOSP IP/OBS MODERATE 35: CPT | Performed by: FAMILY MEDICINE

## 2023-04-19 PROCEDURE — 63710000001 PREDNISONE PER 5 MG

## 2023-04-19 RX ADMIN — OLANZAPINE 20 MG: 5 TABLET, ORALLY DISINTEGRATING ORAL at 21:27

## 2023-04-19 RX ADMIN — QUETIAPINE FUMARATE 300 MG: 200 TABLET ORAL at 21:27

## 2023-04-19 RX ADMIN — OXYCODONE HYDROCHLORIDE 5 MG: 5 TABLET ORAL at 21:31

## 2023-04-19 RX ADMIN — GABAPENTIN 600 MG: 300 CAPSULE ORAL at 16:56

## 2023-04-19 RX ADMIN — PIPERACILLIN SODIUM AND TAZOBACTAM SODIUM 3.38 G: 3; .375 INJECTION, POWDER, LYOPHILIZED, FOR SOLUTION INTRAVENOUS at 08:42

## 2023-04-19 RX ADMIN — VENLAFAXINE HYDROCHLORIDE 75 MG: 75 CAPSULE, EXTENDED RELEASE ORAL at 08:41

## 2023-04-19 RX ADMIN — TRAZODONE HYDROCHLORIDE 300 MG: 100 TABLET ORAL at 21:27

## 2023-04-19 RX ADMIN — PIPERACILLIN SODIUM AND TAZOBACTAM SODIUM 3.38 G: 3; .375 INJECTION, POWDER, LYOPHILIZED, FOR SOLUTION INTRAVENOUS at 16:57

## 2023-04-19 RX ADMIN — PREDNISONE 5 MG: 5 TABLET ORAL at 08:41

## 2023-04-19 RX ADMIN — Medication 10 ML: at 08:43

## 2023-04-19 RX ADMIN — GABAPENTIN 600 MG: 300 CAPSULE ORAL at 21:27

## 2023-04-19 RX ADMIN — BUPRENORPHINE 8 MG: 8 TABLET SUBLINGUAL at 08:40

## 2023-04-19 RX ADMIN — GABAPENTIN 600 MG: 300 CAPSULE ORAL at 08:41

## 2023-04-19 RX ADMIN — Medication 5 MG: at 21:27

## 2023-04-19 RX ADMIN — Medication 10 ML: at 21:28

## 2023-04-19 RX ADMIN — PIPERACILLIN SODIUM AND TAZOBACTAM SODIUM 3.38 G: 3; .375 INJECTION, POWDER, LYOPHILIZED, FOR SOLUTION INTRAVENOUS at 23:37

## 2023-04-19 RX ADMIN — OXYCODONE HYDROCHLORIDE 5 MG: 5 TABLET ORAL at 13:36

## 2023-04-19 RX ADMIN — VANCOMYCIN HYDROCHLORIDE 1500 MG: 5 INJECTION, POWDER, LYOPHILIZED, FOR SOLUTION INTRAVENOUS at 08:42

## 2023-04-19 NOTE — PLAN OF CARE
Goal Outcome Evaluation:           Progress: no change  Outcome Evaluation: VSS.  Pt refused bloodwork, refused morning medication.  Educated on need for blood work and medication, still refused.

## 2023-04-19 NOTE — PLAN OF CARE
Goal Outcome Evaluation:         VSS. Patient continued to refuse labs. Educated on the importance of labs and how it affects patient care. Complaints of pain. Medicated per eMAR. Currently resting in bed with call light in reach. No current issues at this time.

## 2023-04-19 NOTE — PROGRESS NOTES
" [facility]   Hospitalist Progress Note  Date: 2023  Patient Name: Abhijeet Pitts  : 1990  MRN: [id number]  Date of admission: 2023      Subjective   Subjective     Summary: 32 y.o. male brought to the emergency department for evaluation of fever.  Patient was previously seen earlier today at urgent care for fever, runny nose, cough, congestion.  Denies having a productive cough.  States his last episode of vomiting was last night, the persistent cough has remained.  Patient reports chronic abdominal pain for which he takes home Roxicodone, unchanged during the interim.  Patient had a negative flu and COVID test at the urgent care.  Abdominal x-rays at the urgent care were reported \"abnormal, you need to be sent to the ER, and subsequently presented for evaluation.  Patient follows with Dr. Mason, gastroenterology, for Crohn's disease.  He is got a colonoscopy scheduled .  Patient was recently admitted to our facility approximately 4 weeks ago, review of those notes revealed possible aspiration pneumonitis.    Interval Followup: patient feeling a lot better since admission.  No shortness of breath. Abdominal pain at baseline. Refusing labs today.      Objective   Objective     Vitals:   Temp:  [97.8 °F (36.6 °C)-98.7 °F (37.1 °C)] 97.8 °F (36.6 °C)  Heart Rate:  [80-96] 84  Resp:  [18] 18  BP: (101-113)/(62-71) 113/71  Physical Exam    Constitutional: Awake, alert, no acute distress   Respiratory: rales to auscultation bilaterally, nonlabored respirations    Cardiovascular: RRR, no murmurs, rubs, or gallops, palpable pedal pulses bilaterally   Gastrointestinal: Positive bowel sounds, soft, nontender, nondistended   Musculoskeletal: No bilateral ankle edema, no clubbing or cyanosis to extremities   Psychiatric: Appropriate affect, cooperative   Neurologic: Oriented x 3, speech clear   Skin: No rashes         Assessment & Plan   Assessment / Plan     Assessment/Plan:  • Severe sepsis " Oncotype Dx testing requested by Dr. Fine. Oncotype order request was submitted via [employer] online portal on Specimen #[id number] collected on 2-. Will continue to follow for results.     Salome Goodson RN, BSN, OCN     secondary to healthcare associated pneumonia-patient meets sepsis criteria with fever, tachycardia, source of infection is pneumonia.  Patient was recently hospitalized approximately 4 weeks ago.  Initial lactic acid 2.5.  Patient is chronically immunosuppressed secondary to daily prednisone administration due to Crohn's disease.    • Chronic abdominal pain secondary to Crohn's disease  • Crohn's disease.  No Crohn's flare at this time.    • Immunocompromise state secondary to daily prednisone  • Anemia of chronic disease-patient       Plan  • Continue broad-spectrum antibiotics, IV Zosyn  • DC Vancomycin as we cannot obtain vanc labs to monitor vanc levels    • Cultures were obtained in the ED. So far negative  • Continue home prescribed oxycodone and Suboxone.  • Is to follow-up with gastroenterology in 1 week for repeat colonoscopy.    • Repeat labs in am     Discussed plan with RN.    DVT prophylaxis:  Mechanical DVT prophylaxis orders are present.    CODE STATUS:   Level Of Support Discussed With: Patient  Code Status (Patient has no pulse and is not breathing): CPR (Attempt to Resuscitate)  Medical Interventions (Patient has pulse or is breathing): Full Support

## 2023-04-20 ENCOUNTER — READMISSION MANAGEMENT (OUTPATIENT)
Dept: CALL CENTER | Facility: HOSPITAL | Age: 33
End: 2023-04-20
Payer: MEDICARE

## 2023-04-20 VITALS
DIASTOLIC BLOOD PRESSURE: 58 MMHG | HEART RATE: 90 BPM | BODY MASS INDEX: 26.91 KG/M2 | TEMPERATURE: 97.4 F | RESPIRATION RATE: 18 BRPM | OXYGEN SATURATION: 98 % | WEIGHT: 181.66 LBS | SYSTOLIC BLOOD PRESSURE: 99 MMHG | HEIGHT: 69 IN

## 2023-04-20 PROCEDURE — 25010000002 PIPERACILLIN SOD-TAZOBACTAM PER 1 G: Performed by: FAMILY MEDICINE

## 2023-04-20 PROCEDURE — 94799 UNLISTED PULMONARY SVC/PX: CPT

## 2023-04-20 PROCEDURE — 63710000001 PREDNISONE PER 5 MG

## 2023-04-20 RX ORDER — AMOXICILLIN 500 MG/1
1000 CAPSULE ORAL 2 TIMES DAILY
Qty: 12 CAPSULE | Refills: 0 | Status: SHIPPED | OUTPATIENT
Start: 2023-04-20 | End: 2023-04-23

## 2023-04-20 RX ADMIN — Medication 10 ML: at 08:01

## 2023-04-20 RX ADMIN — PIPERACILLIN SODIUM AND TAZOBACTAM SODIUM 3.38 G: 3; .375 INJECTION, POWDER, LYOPHILIZED, FOR SOLUTION INTRAVENOUS at 07:58

## 2023-04-20 RX ADMIN — PANTOPRAZOLE SODIUM 40 MG: 40 TABLET, DELAYED RELEASE ORAL at 07:59

## 2023-04-20 RX ADMIN — PREDNISONE 5 MG: 5 TABLET ORAL at 09:40

## 2023-04-20 RX ADMIN — VENLAFAXINE HYDROCHLORIDE 75 MG: 75 CAPSULE, EXTENDED RELEASE ORAL at 09:40

## 2023-04-20 RX ADMIN — BUPRENORPHINE 8 MG: 8 TABLET SUBLINGUAL at 08:02

## 2023-04-20 RX ADMIN — OXYCODONE HYDROCHLORIDE 5 MG: 5 TABLET ORAL at 08:06

## 2023-04-20 RX ADMIN — GABAPENTIN 600 MG: 300 CAPSULE ORAL at 07:59

## 2023-04-20 NOTE — DISCHARGE SUMMARY
[facility]         HOSPITALIST  DISCHARGE SUMMARY    Patient Name: Abhijeet Pitts  : 1990  MRN: [id number]    Date of Admission: 2023  Date of Discharge:  ***  Primary Care Physician: Provider, No Known    Consults     Date and Time Order Name Status Description    2023  7:47 PM Hospitalist (on-call MD unless specified)      2023  7:39 PM Hospitalist (on-call MD unless specified)            Active and Resolved Hospital Problems:  Active Hospital Problems    Diagnosis POA   • **Sepsis [A41.9] Yes      Resolved Hospital Problems   No resolved problems to display.       Hospital Course     Hospital Course:  Abhijeet Pitts is a 32 y.o. male ***        DISCHARGE Follow Up Recommendations for labs and diagnostics: ***      Day of Discharge     Vital Signs:  Temp:  [97.4 °F (36.3 °C)-98.2 °F (36.8 °C)] 97.4 °F (36.3 °C)  Heart Rate:  [77-90] 90  Resp:  [18] 18  BP: ()/(58-71) 99/58  Physical Exam: ***      Discharge Details        Discharge Medications      New Medications      Instructions Start Date   amoxicillin 500 MG capsule  Commonly known as: AMOXIL   1,000 mg, Oral, 2 Times Daily         Continue These Medications      Instructions Start Date   buprenorphine 8 MG sublingual tablet SL tablet  Commonly known as: SUBUTEX   22 mg, Sublingual, Daily      cloNIDine 0.2 MG tablet  Commonly known as: CATAPRES   0.2 mg, Oral, 2 Times Daily PRN      dicyclomine 20 MG tablet  Commonly known as: BENTYL   20 mg, Oral, Every 6 Hours PRN      gabapentin 600 MG tablet  Commonly known as: NEURONTIN   600 mg, Oral, 3 Times Daily      hydrOXYzine pamoate 50 MG capsule  Commonly known as: VISTARIL   50 mg, Oral, Daily      melatonin 3 MG tablet   6 mg, Oral, Nightly      OLANZapine zydis 20 MG disintegrating tablet  Commonly known as: zyPREXA   20 mg, Translingual, Nightly      omeprazole 40 MG capsule  Commonly known as: priLOSEC   40 mg, Oral, Daily      ondansetron ODT 4 MG disintegrating  tablet  Commonly known as: ZOFRAN-ODT   4 mg, Translingual, Every 6 Hours PRN      oxyCODONE 5 MG immediate release tablet  Commonly known as: Roxicodone   5 mg, Oral, Every 4 Hours PRN      QUEtiapine 300 MG tablet  Commonly known as: SEROquel   300 mg, Oral, Nightly      traZODone 100 MG tablet  Commonly known as: DESYREL   300 mg, Oral, Nightly      venlafaxine XR 75 MG 24 hr capsule  Commonly known as: EFFEXOR-XR   75 mg, Oral, Daily         ASK your doctor about these medications      Instructions Start Date   predniSONE 5 MG tablet  Commonly known as: DELTASONE  Ask about: Should I take this medication?   Take 8 tablets by mouth Daily for 5 days, THEN 6 tablets Daily for 5 days, THEN 4 tablets Daily for 5 days, THEN 3 tablets Daily for 5 days, THEN 2 tablets Daily for 5 days, THEN 1 tablet Daily for 5 days.   Start Date: March 18, 2023            Allergies   Allergen Reactions   • Elemental Sulfur Swelling   • Fish-Derived Products Unknown - Low Severity   • Naloxone Swelling   • Shellfish-Derived Products Unknown - Low Severity   • Tylenol [Acetaminophen] Swelling       Discharge Disposition:      Diet:  Hospital:  Diet Order   Procedures   • Diet: Regular/House Diet; Texture: Regular Texture (IDDSI 7); Fluid Consistency: Thin (IDDSI 0)       Discharge Activity:       CODE STATUS:  Code Status and Medical Interventions:   Ordered at: 04/17/23 2001     Level Of Support Discussed With:    Patient     Code Status (Patient has no pulse and is not breathing):    CPR (Attempt to Resuscitate)     Medical Interventions (Patient has pulse or is breathing):    Full Support         Future Appointments   Date Time Provider Department Center   5/17/2023  9:45 AM Radhika Wilkins APRN [facility]           Pertinent  and/or Most Recent Results     PROCEDURES:   ***    LAB RESULTS:      Lab 04/17/23 2025 04/17/23  1555 04/17/23  1434   WBC  --   --  8.41   HEMOGLOBIN  --   --  10.6*   HEMATOCRIT  --   --   31.3*   PLATELETS  --   --  341   NEUTROS ABS  --   --  6.98   IMMATURE GRANS (ABS)  --   --  0.04   LYMPHS ABS  --   --  0.71   MONOS ABS  --   --  0.66   EOS ABS  --   --  0.00   MCV  --   --  98.1*   LACTATE 2.1* 2.5*  --          Lab 04/17/23  1434   SODIUM 138   POTASSIUM 3.5   CHLORIDE 106   CO2 20.4*   ANION GAP 11.6   BUN 14   CREATININE 0.83   EGFR 117.8   GLUCOSE 120*   CALCIUM 7.2*         Lab 04/17/23  1434   TOTAL PROTEIN 4.4*   ALBUMIN 2.3*   GLOBULIN 2.1   ALT (SGPT) 9   AST (SGOT) 10   BILIRUBIN 0.3   ALK PHOS 100   LIPASE 5*                     Brief Urine Lab Results  (Last result in the past 365 days)      Color   Clarity   Blood   Leuk Est   Nitrite   Protein   CREAT   Urine HCG        04/17/23 1806 Yellow   Clear   Negative   Negative   Negative   Negative               Microbiology Results (last 10 days)     Procedure Component Value - Date/Time    MRSA Screen, PCR (Inpatient) - Swab, Nares [[id number]]  (Normal) Collected: 04/18/23 1447    Lab Status: Final result Specimen: Swab from Nares Updated: 04/18/23 1628     MRSA PCR No MRSA Detected    Narrative:      The negative predictive value of this diagnostic test is high and should only be used to consider de-escalating anti-MRSA therapy. A positive result may indicate colonization with MRSA and must be correlated clinically.    Blood Culture - Blood, Arm, Left [[id number]]  (Normal) Collected: 04/18/23 1105    Lab Status: Preliminary result Specimen: Blood from Arm, Left Updated: 04/20/23 1116     Blood Culture No growth at 2 days    Blood Culture - Blood, Arm, Right [[id number]]  (Normal) Collected: 04/18/23 1105    Lab Status: Preliminary result Specimen: Blood from Arm, Right Updated: 04/20/23 1116     Blood Culture No growth at 2 days    Blood Culture - Blood, Arm, Right [[id number]]  (Normal) Collected: 04/17/23 1555    Lab Status: Preliminary result Specimen: Blood from Arm, Right Updated: 04/19/23 1645     Blood Culture No growth at 2 days     Blood Culture - Blood, Arm, Left [[id number]]  (Normal) Collected: 04/17/23 1555    Lab Status: Preliminary result Specimen: Blood from Arm, Left Updated: 04/19/23 1645     Blood Culture No growth at 2 days          XR Chest 2 View    Result Date: 4/17/2023  Impression:  Hypoinflated lungs with bibasilar atelectasis.     PALAK LESLIE MD       Electronically Signed and Approved By: PALAK LESLIE MD on 4/17/2023 at 14:22             CT Abdomen Pelvis With Contrast    Result Date: 4/17/2023  Impression:   1. Diffuse distention of small bowel and colon in a pattern similar compared to previous CTs.  Small bowel distention has increased in the left upper quadrant compared to 4/10/2023 CT. 2. Stable mildly enlarged mesenteric lymph nodes. 3. New consolidative and ground-glass opacities in the included lung bases, concerning for infection, possibly aspiration.     AGUEDA FELDMAN MD       Electronically Signed and Approved By: AGUEDA FELDMAN MD on 4/17/2023 at 17:05             CT Abdomen Pelvis With Contrast    Result Date: 4/10/2023  Impression:   1. Interval improvement in the airspace disease in the lung bases, especially on the right, since 3/15/2023.  Mild subsegmental atelectasis is thought to persist in the lung bases.  No definite acute infiltrate is seen.  2. Dilated bowel persists, including small bowel and colon, with narrowing and mural thickening of the distal ileum.  The findings may be related to acute exacerbation of the patient's known Crohn's enteritis possible partial or complete distal mechanical small bowel obstruction versus a generalized adynamic ileus.  Similar findings were seen on the 1/20/2023 CT study.  The 3/15/2023 study was a especially obscured in the right lower quadrant by motion artifact.  No pneumoperitoneum or pneumatosis is seen.  No portal or mesenteric venous gas is seen to suggest ischemic enteritis No ascites.  No definite abscess or fistula is appreciated.  3. No other  definite acute findings are appreciated.  4. Please see above comments for further detail. 1.   Please note that portions of this note were completed with a voice recognition program.  SRINIVASA NOEL JR, MD       Electronically Signed and Approved By: SRINIVASA NOEL JR, MD on 4/10/2023 at 23:52              XR Abdomen KUB    Result Date: 4/17/2023  Impression:  Continued abnormal bowel gas pattern with an appearance more suggestive of ileus than obstruction although obstruction is in the differential diagnosis.     Donnie Nova MD       Electronically Signed and Approved By: Donnie Nova MD on 4/17/2023 at 14:16                           Labs Pending at Discharge:  Pending Labs     Order Current Status    Blood Culture - Blood, Arm, Left Preliminary result    Blood Culture - Blood, Arm, Left Preliminary result    Blood Culture - Blood, Arm, Right Preliminary result    Blood Culture - Blood, Arm, Right Preliminary result            Time spent on Discharge including face to face service:  *** minutes    Electronically signed by Horacio Maxwell DO, 04/20/23, 1:04 PM EDT.

## 2023-04-20 NOTE — PLAN OF CARE
Goal Outcome Evaluation:      Patient discharged. Refused to wait for discharge paperwork to be printed. Held up do to computer error. Educated patient on the benefit of receiving discharge paperwork and medication that will be given at discharge. Patient refused to wait.

## 2023-04-20 NOTE — PLAN OF CARE
Goal Outcome Evaluation:           Progress: no change  Outcome Evaluation: VSS.  Pt refused morning blood work.  Educated on need for blood work, still refused.  Pt resting throughout shift.

## 2023-04-20 NOTE — OUTREACH NOTE
Prep Survey    Flowsheet Row Responses   [facility] facility patient discharged from? [facility]   Is LACE score < 7 ? No   Eligibility Readm Mgmt   Discharge diagnosis Severe sepsis secondary to healthcare associated pneumonia   Does the patient have one of the following disease processes/diagnoses(primary or secondary)? Sepsis   Does the patient have Home health ordered? No   Is there a DME ordered? No   Prep survey completed? Yes          Yumiko MEJIA - Registered Nurse

## 2023-04-22 LAB
BACTERIA SPEC AEROBE CULT: NORMAL
BACTERIA SPEC AEROBE CULT: NORMAL

## 2023-04-23 LAB
BACTERIA SPEC AEROBE CULT: NORMAL
BACTERIA SPEC AEROBE CULT: NORMAL

## 2023-04-25 ENCOUNTER — READMISSION MANAGEMENT (OUTPATIENT)
Dept: CALL CENTER | Facility: HOSPITAL | Age: 33
End: 2023-04-25
Payer: MEDICARE

## 2023-04-25 NOTE — OUTREACH NOTE
Sepsis Week 1 Survey    Flowsheet Row Responses   [facility] patient discharged from? [facility]   Does the patient have one of the following disease processes/diagnoses(primary or secondary)? Sepsis   Week 1 attempt successful? Yes   Call start time 1655   Call end time 1656   Discharge diagnosis Severe sepsis secondary to healthcare associated pneumonia   Meds reviewed with patient/caregiver? Yes   Is the patient having any side effects they believe may be caused by any medication additions or changes? No   Does the patient have all medications related to this admission filled (includes all antibiotics, inhalers, nebulizers,steroids,etc.) Yes   Is the patient taking all medications as directed (includes completed medication regime)? Yes   Week 1 call completed? Yes   Wrap up additional comments Call was brief and pt states he was at work and ended call.           Emely HALLMAN - Licensed Nurse

## 2023-05-02 ENCOUNTER — TELEPHONE (OUTPATIENT)
Dept: GASTROENTEROLOGY | Facility: CLINIC | Age: 33
End: 2023-05-02
Payer: MEDICARE

## 2023-05-02 NOTE — TELEPHONE ENCOUNTER
"Called pt in regards to colonoscopy 4/25/2023, pt states \"agreeable to r/s, can't talk right now, will call us back\"  "

## 2023-05-03 ENCOUNTER — HOSPITAL ENCOUNTER (EMERGENCY)
Facility: HOSPITAL | Age: 33
Discharge: HOME | End: 2023-05-04
Attending: EMERGENCY MEDICINE
Payer: MEDICARE

## 2023-05-03 ENCOUNTER — APPOINTMENT (OUTPATIENT)
Dept: CT IMAGING | Facility: HOSPITAL | Age: 33
End: 2023-05-03
Payer: MEDICARE

## 2023-05-03 DIAGNOSIS — K50.00 CROHN'S DISEASE OF SMALL INTESTINE WITHOUT COMPLICATION: Primary | ICD-10-CM

## 2023-05-03 LAB
ALBUMIN SERPL-MCNC: 2.1 G/DL (ref 3.5–5.2)
ALBUMIN/GLOB SERPL: 0.8 G/DL
ALP SERPL-CCNC: 128 U/L (ref 39–117)
ALT SERPL W P-5'-P-CCNC: 15 U/L (ref 1–41)
ANION GAP SERPL CALCULATED.3IONS-SCNC: 8.8 MMOL/L (ref 5–15)
AST SERPL-CCNC: 15 U/L (ref 1–40)
BASOPHILS # BLD AUTO: 0.02 10*3/MM3 (ref 0–0.2)
BASOPHILS NFR BLD AUTO: 0.4 % (ref 0–1.5)
BILIRUB SERPL-MCNC: 0.3 MG/DL (ref 0–1.2)
BILIRUB UR QL STRIP: NEGATIVE
BUN SERPL-MCNC: 15 MG/DL (ref 6–20)
BUN/CREAT SERPL: 14.3 (ref 7–25)
CALCIUM SPEC-SCNC: 7.5 MG/DL (ref 8.6–10.5)
CHLORIDE SERPL-SCNC: 112 MMOL/L (ref 98–107)
CLARITY UR: CLEAR
CO2 SERPL-SCNC: 22.2 MMOL/L (ref 22–29)
COLOR UR: ABNORMAL
CREAT SERPL-MCNC: 1.05 MG/DL (ref 0.76–1.27)
DEPRECATED RDW RBC AUTO: 53 FL (ref 37–54)
EGFRCR SERPLBLD CKD-EPI 2021: 95.5 ML/MIN/1.73
EOSINOPHIL # BLD AUTO: 0.04 10*3/MM3 (ref 0–0.4)
EOSINOPHIL NFR BLD AUTO: 0.8 % (ref 0.3–6.2)
ERYTHROCYTE [DISTWIDTH] IN BLOOD BY AUTOMATED COUNT: 14.7 % (ref 12.3–15.4)
GLOBULIN UR ELPH-MCNC: 2.5 GM/DL
GLUCOSE SERPL-MCNC: 93 MG/DL (ref 65–99)
GLUCOSE UR STRIP-MCNC: NEGATIVE MG/DL
HCT VFR BLD AUTO: 35.8 % (ref 37.5–51)
HGB BLD-MCNC: 12.4 G/DL (ref 13–17.7)
HGB UR QL STRIP.AUTO: NEGATIVE
HOLD SPECIMEN: NORMAL
IMM GRANULOCYTES # BLD AUTO: 0.01 10*3/MM3 (ref 0–0.05)
IMM GRANULOCYTES NFR BLD AUTO: 0.2 % (ref 0–0.5)
KETONES UR QL STRIP: ABNORMAL
LEUKOCYTE ESTERASE UR QL STRIP.AUTO: NEGATIVE
LIPASE SERPL-CCNC: 4 U/L (ref 13–60)
LYMPHOCYTES # BLD AUTO: 1.03 10*3/MM3 (ref 0.7–3.1)
LYMPHOCYTES NFR BLD AUTO: 19.6 % (ref 19.6–45.3)
MCH RBC QN AUTO: 33.6 PG (ref 26.6–33)
MCHC RBC AUTO-ENTMCNC: 34.6 G/DL (ref 31.5–35.7)
MCV RBC AUTO: 97 FL (ref 79–97)
MONOCYTES # BLD AUTO: 0.62 10*3/MM3 (ref 0.1–0.9)
MONOCYTES NFR BLD AUTO: 11.8 % (ref 5–12)
NEUTROPHILS NFR BLD AUTO: 3.53 10*3/MM3 (ref 1.7–7)
NEUTROPHILS NFR BLD AUTO: 67.2 % (ref 42.7–76)
NITRITE UR QL STRIP: NEGATIVE
NRBC BLD AUTO-RTO: 0 /100 WBC (ref 0–0.2)
PH UR STRIP.AUTO: 6 [PH] (ref 5–8)
PLATELET # BLD AUTO: 310 10*3/MM3 (ref 140–450)
PMV BLD AUTO: 9.5 FL (ref 6–12)
POTASSIUM SERPL-SCNC: 3.2 MMOL/L (ref 3.5–5.2)
PROT SERPL-MCNC: 4.6 G/DL (ref 6–8.5)
PROT UR QL STRIP: ABNORMAL
RBC # BLD AUTO: 3.69 10*6/MM3 (ref 4.14–5.8)
SODIUM SERPL-SCNC: 143 MMOL/L (ref 136–145)
SP GR UR STRIP: >=1.03 (ref 1–1.03)
UROBILINOGEN UR QL STRIP: ABNORMAL
WBC NRBC COR # BLD: 5.25 10*3/MM3 (ref 3.4–10.8)
WHOLE BLOOD HOLD COAG: NORMAL
WHOLE BLOOD HOLD SPECIMEN: NORMAL

## 2023-05-03 PROCEDURE — 96361 HYDRATE IV INFUSION ADD-ON: CPT

## 2023-05-03 PROCEDURE — 25010000002 KETOROLAC TROMETHAMINE PER 15 MG: Performed by: NURSE PRACTITIONER

## 2023-05-03 PROCEDURE — 25010000002 ONDANSETRON PER 1 MG: Performed by: NURSE PRACTITIONER

## 2023-05-03 PROCEDURE — 81003 URINALYSIS AUTO W/O SCOPE: CPT

## 2023-05-03 PROCEDURE — 83690 ASSAY OF LIPASE: CPT

## 2023-05-03 PROCEDURE — 36415 COLL VENOUS BLD VENIPUNCTURE: CPT

## 2023-05-03 PROCEDURE — 80053 COMPREHEN METABOLIC PANEL: CPT

## 2023-05-03 PROCEDURE — 99283 EMERGENCY DEPT VISIT LOW MDM: CPT

## 2023-05-03 PROCEDURE — 85025 COMPLETE CBC W/AUTO DIFF WBC: CPT

## 2023-05-03 PROCEDURE — 96374 THER/PROPH/DIAG INJ IV PUSH: CPT

## 2023-05-03 PROCEDURE — 25510000001 IOPAMIDOL PER 1 ML: Performed by: EMERGENCY MEDICINE

## 2023-05-03 PROCEDURE — 96375 TX/PRO/DX INJ NEW DRUG ADDON: CPT

## 2023-05-03 PROCEDURE — 74177 CT ABD & PELVIS W/CONTRAST: CPT

## 2023-05-03 RX ORDER — DICYCLOMINE HYDROCHLORIDE 10 MG/1
20 CAPSULE ORAL ONCE
Status: COMPLETED | OUTPATIENT
Start: 2023-05-03 | End: 2023-05-03

## 2023-05-03 RX ORDER — ONDANSETRON 2 MG/ML
4 INJECTION INTRAMUSCULAR; INTRAVENOUS ONCE
Status: COMPLETED | OUTPATIENT
Start: 2023-05-03 | End: 2023-05-03

## 2023-05-03 RX ORDER — SODIUM CHLORIDE 0.9 % (FLUSH) 0.9 %
10 SYRINGE (ML) INJECTION AS NEEDED
Status: DISCONTINUED | OUTPATIENT
Start: 2023-05-03 | End: 2023-05-04 | Stop reason: HOSPADM

## 2023-05-03 RX ORDER — KETOROLAC TROMETHAMINE 30 MG/ML
30 INJECTION, SOLUTION INTRAMUSCULAR; INTRAVENOUS ONCE
Status: COMPLETED | OUTPATIENT
Start: 2023-05-03 | End: 2023-05-03

## 2023-05-03 RX ADMIN — IOPAMIDOL 100 ML: 755 INJECTION, SOLUTION INTRAVENOUS at 23:49

## 2023-05-03 RX ADMIN — ONDANSETRON 4 MG: 2 INJECTION INTRAMUSCULAR; INTRAVENOUS at 23:31

## 2023-05-03 RX ADMIN — SODIUM CHLORIDE 1000 ML: 9 INJECTION, SOLUTION INTRAVENOUS at 23:32

## 2023-05-03 RX ADMIN — KETOROLAC TROMETHAMINE 30 MG: 30 INJECTION, SOLUTION INTRAMUSCULAR; INTRAVENOUS at 23:31

## 2023-05-03 RX ADMIN — DICYCLOMINE HYDROCHLORIDE 20 MG: 10 CAPSULE ORAL at 23:34

## 2023-05-03 NOTE — ED PROVIDER NOTES
Time: 6:09 PM EDT  Date of encounter:  5/3/2023  Independent Historian/Clinical History and Information was obtained by:   Patient  Chief Complaint: Abdominal pain    History is limited by: N/A    History of Present Illness:  Patient is a 32 y.o. year old male who presents to the emergency department for evaluation of abdominal pain, nausea, vomiting and diarrhea for the past 2 days.  Denies recent travel or changes of food or exposure to illness. Hx of crohns. Was supposed to have colonoscopy on apr 25 but has to reschedule to to inability tolerate po prep. Supposed to reschedule but hasnt yet. Pain is generalized and sharp and stabbing. Nothing improves. Touch and eating makes worse    HPI    Patient Care Team  Primary Care Provider: Provider, No Known    Past Medical History:     Allergies   Allergen Reactions   • Elemental Sulfur Swelling   • Fish-Derived Products Unknown - Low Severity   • Naloxone Swelling   • Shellfish-Derived Products Unknown - Low Severity   • Tylenol [Acetaminophen] Swelling     Past Medical History:   Diagnosis Date   • Crohn's disease      Past Surgical History:   Procedure Laterality Date   • COLON SURGERY     • SIGMOIDOSCOPY N/A 02/14/2023    Procedure: SIGMOIDOSCOPY FLEXIBLE;  Surgeon: Valentin Mason MD;  Location: [facility]OSCOPY;  Service: Gastroenterology;  Laterality: N/A;  POOR PREP   • STOMACH SURGERY       Family History   Problem Relation Age of Onset   • Colon cancer Neg Hx        Home Medications:  Prior to Admission medications    Medication Sig Start Date End Date Taking? Authorizing Provider   buprenorphine (SUBUTEX) 8 MG sublingual tablet SL tablet Place 22 mg under the tongue Daily.    Provider, MD Van   cloNIDine (CATAPRES) 0.2 MG tablet Take 1 tablet by mouth 2 (Two) Times a Day As Needed.    Provider, MD Van   dicyclomine (BENTYL) 20 MG tablet Take 1 tablet by mouth Every 6 (Six) Hours As Needed (abdominal pain). 3/8/23   Maverick Quintero MD    gabapentin (NEURONTIN) 600 MG tablet Take 1 tablet by mouth 3 (Three) Times a Day.    Van Segura MD   hydrOXYzine pamoate (VISTARIL) 50 MG capsule Take 1 capsule by mouth Daily.    Van Segura MD   melatonin 3 MG tablet Take 2 tablets by mouth Every Night.    Van Segura MD   OLANZapine zydis (zyPREXA) 20 MG disintegrating tablet Place 1 tablet on the tongue Every Night.    Van Segura MD   omeprazole (priLOSEC) 40 MG capsule Take 1 capsule by mouth Daily.    Van Segura MD   ondansetron ODT (ZOFRAN-ODT) 4 MG disintegrating tablet Place 1 tablet on the tongue Every 6 (Six) Hours As Needed for Nausea or Vomiting. 4/11/23   aIn Vizcaino MD   oxyCODONE (Roxicodone) 5 MG immediate release tablet Take 1 tablet by mouth Every 4 (Four) Hours As Needed for Severe Pain. 4/11/23   Ian Vizcaino MD   QUEtiapine (SEROquel) 300 MG tablet Take 1 tablet by mouth Every Night.    Van Segura MD   traZODone (DESYREL) 100 MG tablet Take 3 tablets by mouth Every Night.    Van Segura MD   venlafaxine XR (EFFEXOR-XR) 75 MG 24 hr capsule Take 1 capsule by mouth Daily.    Van Segura MD        Social History:   Social History     Tobacco Use   • Smoking status: Every Day     Packs/day: 1.00     Years: 10.00     Pack years: 10.00     Types: Cigarettes   • Smokeless tobacco: Never   Vaping Use   • Vaping Use: Every day   Substance Use Topics   • Alcohol use: Not Currently   • Drug use: Not Currently         Review of Systems:  Review of Systems   Constitutional: Negative for chills and fever.   HENT: Negative for congestion, ear pain and sore throat.    Eyes: Negative for pain.   Respiratory: Negative for cough, chest tightness and shortness of breath.    Cardiovascular: Negative for chest pain.   Gastrointestinal: Positive for abdominal pain, diarrhea, nausea and vomiting.   Genitourinary: Negative for flank pain and hematuria.   Musculoskeletal:  "Negative for joint swelling.   Skin: Negative for pallor.   Neurological: Negative for seizures and headaches.   Hematological: Negative.    Psychiatric/Behavioral: Negative.    All other systems reviewed and are negative.       Physical Exam:  BP 95/57   Pulse 72   Temp 98.4 °F (36.9 °C) (Oral)   Resp 19   Ht 175.3 cm (69\")   Wt 84.3 kg (185 lb 13.6 oz)   SpO2 100%   BMI 27.44 kg/m²     Physical Exam  Vitals and nursing note reviewed.   Constitutional:       General: He is not in acute distress.     Appearance: Normal appearance. He is not toxic-appearing.   HENT:      Head: Normocephalic and atraumatic.      Mouth/Throat:      Mouth: Mucous membranes are moist.   Eyes:      General: No scleral icterus.     Extraocular Movements: Extraocular movements intact.      Conjunctiva/sclera: Conjunctivae normal.   Cardiovascular:      Rate and Rhythm: Normal rate and regular rhythm.      Pulses: Normal pulses.      Heart sounds: Normal heart sounds.   Pulmonary:      Effort: Pulmonary effort is normal. No respiratory distress.      Breath sounds: Normal breath sounds.   Abdominal:      General: Abdomen is flat. Bowel sounds are normal. There is no distension.      Palpations: Abdomen is soft.      Tenderness: There is generalized abdominal tenderness. There is no right CVA tenderness or left CVA tenderness.      Hernia: No hernia is present.   Musculoskeletal:         General: Normal range of motion.      Cervical back: Normal range of motion and neck supple.   Skin:     General: Skin is warm and dry.      Coloration: Skin is not cyanotic.   Neurological:      Mental Status: He is alert and oriented to person, place, and time. Mental status is at baseline.   Psychiatric:         Attention and Perception: Attention and perception normal.         Mood and Affect: Mood normal.         Behavior: Behavior normal.          Procedures:  Procedures      Medical Decision Making:      Comorbidities that affect " care:    crohns    External Notes reviewed:    Telephone Encounter: dr alegria office attempted to reschedule colonsoscopy last week but pt stated he couldnt talk and would call them back but hasnt      The following orders were placed and all results were independently analyzed by me:  Orders Placed This Encounter   Procedures   • CT Abdomen Pelvis With Contrast   • [place] Draw   • Comprehensive Metabolic Panel   • Lipase   • Urinalysis With Microscopic If Indicated (No Culture) - Urine, Clean Catch   • CBC Auto Differential   • NPO Diet NPO Type: Strict NPO   • Undress & Gown   • Insert Peripheral IV   • CBC & Differential   • Green Top (Gel)   • Lavender Top   • Gold Top - SST   • Light Blue Top   • Extra Tubes   • Gold Top - SST       Medications Given in the Emergency Department:  Medications   sodium chloride 0.9 % flush 10 mL (has no administration in time range)   oxyCODONE (ROXICODONE) immediate release tablet 5 mg (has no administration in time range)   ketorolac (TORADOL) injection 30 mg (30 mg Intravenous Given 5/3/23 2331)   ondansetron (ZOFRAN) injection 4 mg (4 mg Intravenous Given 5/3/23 2331)   sodium chloride 0.9 % bolus 1,000 mL (0 mL Intravenous Stopped 5/4/23 0124)   dicyclomine (BENTYL) capsule 20 mg (20 mg Oral Given 5/3/23 2334)   iopamidol (ISOVUE-370) 76 % injection 100 mL (100 mL Intravenous Given 5/3/23 2349)        ED Course:    ED Course as of 05/04/23 0135   Wed May 03, 2023   1809 --- PROVIDER IN TRIAGE NOTE ---    The patient was evaluated by Alfredo carbajal in triage. Orders were placed and the patient is currently awaiting disposition.    [AJ]      ED Course User Index  [AJ] Alfredo Frias PAAnjelC       Labs:    Lab Results (last 24 hours)     Procedure Component Value Units Date/Time    CBC & Differential [[id number]]  (Abnormal) Collected: 05/03/23 2142    Specimen: Blood Updated: 05/03/23 2151    Narrative:      The following orders were created for panel order CBC &  Differential.  Procedure                               Abnormality         Status                     ---------                               -----------         ------                     CBC Auto Differential[[id number]]        Abnormal            Final result                 Please view results for these tests on the individual orders.    Comprehensive Metabolic Panel [[id number]]  (Abnormal) Collected: 05/03/23 2142    Specimen: Blood Updated: 05/03/23 2235     Glucose 93 mg/dL      BUN 15 mg/dL      Creatinine 1.05 mg/dL      Sodium 143 mmol/L      Potassium 3.2 mmol/L      Chloride 112 mmol/L      CO2 22.2 mmol/L      Calcium 7.5 mg/dL      Total Protein 4.6 g/dL      Albumin 2.1 g/dL      ALT (SGPT) 15 U/L      AST (SGOT) 15 U/L      Alkaline Phosphatase 128 U/L      Total Bilirubin 0.3 mg/dL      Globulin 2.5 gm/dL      A/G Ratio 0.8 g/dL      BUN/Creatinine Ratio 14.3     Anion Gap 8.8 mmol/L      eGFR 95.5 mL/min/1.73     Narrative:      GFR Normal >60  Chronic Kidney Disease <60  Kidney Failure <15      Lipase [[id number]]  (Abnormal) Collected: 05/03/23 2142    Specimen: Blood Updated: 05/03/23 2235     Lipase 4 U/L     CBC Auto Differential [[id number]]  (Abnormal) Collected: 05/03/23 2142    Specimen: Blood Updated: 05/03/23 2151     WBC 5.25 10*3/mm3      RBC 3.69 10*6/mm3      Hemoglobin 12.4 g/dL      Hematocrit 35.8 %      MCV 97.0 fL      MCH 33.6 pg      MCHC 34.6 g/dL      RDW 14.7 %      RDW-SD 53.0 fl      MPV 9.5 fL      Platelets 310 10*3/mm3      Neutrophil % 67.2 %      Lymphocyte % 19.6 %      Monocyte % 11.8 %      Eosinophil % 0.8 %      Basophil % 0.4 %      Immature Grans % 0.2 %      Neutrophils, Absolute 3.53 10*3/mm3      Lymphocytes, Absolute 1.03 10*3/mm3      Monocytes, Absolute 0.62 10*3/mm3      Eosinophils, Absolute 0.04 10*3/mm3      Basophils, Absolute 0.02 10*3/mm3      Immature Grans, Absolute 0.01 10*3/mm3      nRBC 0.0 /100 WBC     Urinalysis With Microscopic If Indicated  (No Culture) - Urine, Clean Catch [[id number]]  (Abnormal) Collected: 05/03/23 2154    Specimen: Urine, Clean Catch Updated: 05/03/23 2205     Color, UA Dark Yellow     Appearance, UA Clear     pH, UA 6.0     Specific Gravity, UA >=1.030     Glucose, UA Negative     Ketones, UA Trace     Bilirubin, UA Negative     Blood, UA Negative     Protein, UA Trace     Leuk Esterase, UA Negative     Nitrite, UA Negative     Urobilinogen, UA 1.0 E.U./dL    Narrative:      Urine microscopic not indicated.           Imaging:    CT Abdomen Pelvis With Contrast    Result Date: 5/4/2023  PROCEDURE: CT ABDOMEN PELVIS W CONTRAST  COMPARISONS: 4/17/2023; 4/10/2023; 3/15/2023; 1/20/2023.  Please note that this is the patient's 5th enhanced abdominal/pelvic CT study ordered this year through the [facility] Emergency Department.  INDICATIONS: Unspecified abdominal pain; nausea, vomiting, & diarrhea for the past 2 days.  TECHNIQUE: After obtaining the patient's consent, 534 CT images were created with non-ionic intravenous contrast material.  No oral contrast agent was administered for the study.  PROTOCOL:   Standard CT imaging protocol performed.    RADIATION:   Total DLP: 472.7 mGy*cm (this CT exam); total DLP (YTD, 2023): 2,138.9 mGy*cm.   Automated exposure control was utilized to minimize radiation dose. CONTRAST: 100 mL Isovue 370 I.V. LABS:   eGFR: 95.5 mL/min/1.73m^2.  FINDINGS: Greater degree of fatty infiltration of the liver is identified when compared with the prior study from 4/17/2023.  Please correlate clinically.  No hepatomegaly.  No splenomegaly.  There are is fluid and gas distended colon and small bowel as seen on prior CT studies, which suggest a generalized adynamic ileus.  A mechanical bowel obstruction is thought to be less likely.  The maximum diameter of the dilated small bowel is about 6.7 cm.  The maximum diameter of dilated colon is about 6 cm.  A small amount of ascites is seen, especially in the lower pelvis,  greater on the right, with a CT number of about 4 Hounsfield units.  There are small to moderate sized mesenteric lymph nodes, which are nonspecific.  They were seen previously.  They may be reactive in nature.  There are postoperative changes suggestive of partial resection of distal small bowel and probably right colon with an anastomotic suture line in the right-sided abdomen.  Similar findings have been seen previously.  The small bowel proximal to the right-sided anastomosis has a thickened appearance, which may be related to an artifact from peristalsis or underdistention.  Exacerbation of inflammatory bowel disease is possible.  An infectious enteritis cannot be excluded.  Similar findings have been seen on prior studies dating back to 1/20/2023.  No pneumoperitoneum or pneumatosis is seen.  No definite pericolonic or perienteric fluid collections are identified to suggest abscess.  No portal or mesenteric venous gas.  There may be mild atelectasis in the lung bases.  Acute infiltrate is thought to be less likely.  No definite venous thrombosis is seen.  The gallbladder is distended as on prior exams.  No gallstones or acute cholecystitis is suspected.  No acute pancreatitis.  No hydronephrosis or obstructive uropathy.  No renal/ureteral stones are seen.  Some degree of anasarca is possible.         1. There is greater fatty infiltration of the liver when compared with the prior 4/17/2023 CT study.   2. Otherwise, no significant interval change is identified with a suspected generalized adynamic ileus and a thickened appearance of distal ileum (proximal to an anastomosis in the right abdomen).  The findings may represent exacerbation of the patient's known Crohn's disease.  No pneumoperitoneum or pneumatosis is seen.  No portal or mesenteric venous gas.  No definite abscess is identified.  A small amount of ascites is present.  Consider Gastroenterology consultation if not already obtained.   3. Please see  above comments for further detail.     Please note that portions of this note were completed with a voice recognition program.  SRINIVASA NOEL JR, MD       Electronically Signed and Approved By: SRINIVASA NOEL JR, MD on 5/04/2023 at 1:17                  Differential Diagnosis and Discussion:    Abdominal Pain: Based on the patient's signs and symptoms, I considered abdominal aortic aneurysm, small bowel obstruction, pancreatitis, acute cholecystitis, acute appendecitis, peptic ulcer disease, gastritis, colitis, endocrine disorders, irritable bowel syndrome and other differential diagnosis an etiology of the patient's abdominal pain.    All labs were reviewed and interpreted by me.  CT scan radiology impression was interpreted by me.    MDM  Number of Diagnoses or Management Options  Crohn's disease of small intestine without complication  Diagnosis management comments: The patient is resting comfortably and feels better, is alert and in no distress. Repeat examination is unremarkable and benign; in particular, there's no discomfort at McBurney's point and there is no pulsatile mass. The history, exam, diagnostic testing, and current condition does not suggest acute appendicitis, bowel obstruction, acute cholecystitis, bowel perforation, major gastrointestinal bleeding, severe diverticulitis, abdominal aortic aneurysm, mesenteric ischemia, volvulus, sepsis, or other significant pathology that warrants further testing, continued ED treatment, admission, for surgical evaluation at this point. The vital signs have been stable. The patient does not have uncontrollable pain, intractable vomiting, or other significant symptoms. The patient's condition is stable and appropriate for discharge from the emergency department.         Amount and/or Complexity of Data Reviewed  Clinical lab tests: reviewed and ordered  Tests in the radiology section of CPT®: reviewed and ordered  Tests in the medicine section of CPT®: ordered  and reviewed  Decide to obtain previous medical records or to obtain history from someone other than the patient: yes  Review and summarize past medical records: yes (Reviewed past notes)    Risk of Complications, Morbidity, and/or Mortality  Presenting problems: moderate  Diagnostic procedures: moderate  Management options: low    Patient Progress  Patient progress: stable         Patient Care Considerations:    CONSULT: I considered consulting gastroenterology, however no acute findings      Consultants/Shared Management Plan:    None    Social Determinants of Health:    Patient is independent, reliable, and has access to care.       Disposition and Care Coordination:    Discharged: I considered escalation of care by admitting this patient for observation, however the patient has improved and is suitable and  stable for discharge.    I have explained the patient´s condition, diagnoses and treatment plan based on the information available to me at this time. I have answered questions and addressed any concerns. The patient has a good  understanding of the patient´s diagnosis, condition, and treatment plan as can be expected at this point. The vital signs have been stable. The patient´s condition is stable and appropriate for discharge from the emergency department.      The patient will pursue further outpatient evaluation with the primary care physician or other designated or consulting physician as outlined in the discharge instructions. They are agreeable to this plan of care and follow-up instructions have been explained in detail. The patient has received these instructions in written format and have expressed an understanding of the discharge instructions. The patient is aware that any significant change in condition or worsening of symptoms should prompt an immediate return to this or the closest emergency department or call to 911.  I have explained discharge medications and the need for follow up with the  patient/caretakers. This was also printed in the discharge instructions. Patient was discharged with the following medications and follow up:      Medication List      New Prescriptions    promethazine 25 MG tablet  Commonly known as: PHENERGAN  Take 1 tablet by mouth Every 6 (Six) Hours As Needed for Nausea or Vomiting.           Where to Get Your Medications      These medications were sent [place] [facility] [place] [address] - [phone] [facility] [phone] FX  [address], [place] [address]    Phone: [phone]   · dicyclomine 20 MG tablet  · ondansetron ODT 4 MG disintegrating tablet  · promethazine 25 MG tablet      Valentin Mason MD  [address] [facility] [address]  [phone]    Call today         Final diagnoses:   Crohn's disease of small intestine without complication        ED Disposition     ED Disposition   Discharge    Condition   Stable    Comment   --             This medical record created using voice recognition software.           Ligia Randle, APRN  05/04/23 0135

## 2023-05-03 NOTE — Clinical Note
[facility] EMERGENCY ROOM  [address]  [place] [address]  Phone: [phone]    Abhijeet Pitts was seen and treated in our emergency department on 5/3/2023.  He may return to work on 05/05/2023.         Thank you for choosing [facility].    Ligia Randle APRN

## 2023-05-04 VITALS
HEIGHT: 69 IN | BODY MASS INDEX: 27.53 KG/M2 | SYSTOLIC BLOOD PRESSURE: 110 MMHG | OXYGEN SATURATION: 100 % | TEMPERATURE: 98.4 F | HEART RATE: 74 BPM | RESPIRATION RATE: 16 BRPM | DIASTOLIC BLOOD PRESSURE: 63 MMHG | WEIGHT: 185.85 LBS

## 2023-05-04 RX ORDER — OXYCODONE HYDROCHLORIDE 5 MG/1
5 TABLET ORAL ONCE
Status: COMPLETED | OUTPATIENT
Start: 2023-05-04 | End: 2023-05-04

## 2023-05-04 RX ORDER — DICYCLOMINE HCL 20 MG
20 TABLET ORAL EVERY 6 HOURS PRN
Qty: 20 TABLET | Refills: 0 | Status: SHIPPED | OUTPATIENT
Start: 2023-05-04

## 2023-05-04 RX ORDER — PROMETHAZINE HYDROCHLORIDE 25 MG/1
25 TABLET ORAL EVERY 6 HOURS PRN
Qty: 12 TABLET | Refills: 0 | Status: SHIPPED | OUTPATIENT
Start: 2023-05-04

## 2023-05-04 RX ORDER — ONDANSETRON 4 MG/1
4 TABLET, ORALLY DISINTEGRATING ORAL EVERY 6 HOURS PRN
Qty: 12 TABLET | Refills: 0 | Status: SHIPPED | OUTPATIENT
Start: 2023-05-04

## 2023-05-04 RX ADMIN — OXYCODONE HYDROCHLORIDE 5 MG: 5 TABLET ORAL at 01:40

## 2023-05-04 NOTE — DISCHARGE INSTRUCTIONS
Ct shows chronic crohns . No significant change from previous.    Follow up with dr alegria to reschedule colonoscopy and for any pain management needed

## 2023-05-16 ENCOUNTER — TELEPHONE (OUTPATIENT)
Dept: GASTROENTEROLOGY | Facility: CLINIC | Age: 33
End: 2023-05-16
Payer: MEDICARE

## 2023-05-16 NOTE — TELEPHONE ENCOUNTER
Called patient to confirm tomorrow's appointment. Patient requested information on where to have his labs done that had been ordered. Provided patient with this information.

## 2023-05-17 ENCOUNTER — OFFICE VISIT (OUTPATIENT)
Dept: GASTROENTEROLOGY | Facility: CLINIC | Age: 33
End: 2023-05-17
Payer: MEDICARE

## 2023-05-17 ENCOUNTER — PREP FOR SURGERY (OUTPATIENT)
Dept: OTHER | Facility: HOSPITAL | Age: 33
End: 2023-05-17
Payer: MEDICARE

## 2023-05-17 VITALS
HEIGHT: 69 IN | BODY MASS INDEX: 27.43 KG/M2 | SYSTOLIC BLOOD PRESSURE: 91 MMHG | HEART RATE: 57 BPM | DIASTOLIC BLOOD PRESSURE: 48 MMHG | WEIGHT: 185.2 LBS

## 2023-05-17 DIAGNOSIS — R19.7 DIARRHEA, UNSPECIFIED TYPE: Primary | ICD-10-CM

## 2023-05-17 DIAGNOSIS — K50.019 CROHN'S DISEASE OF SMALL INTESTINE WITH COMPLICATION: ICD-10-CM

## 2023-05-17 DIAGNOSIS — K50.019 CROHN'S DISEASE OF SMALL INTESTINE WITH COMPLICATION: Primary | ICD-10-CM

## 2023-05-17 DIAGNOSIS — R19.7 DIARRHEA, UNSPECIFIED TYPE: ICD-10-CM

## 2023-05-17 RX ORDER — PEG-3350, SODIUM SULFATE, SODIUM CHLORIDE, POTASSIUM CHLORIDE, SODIUM ASCORBATE AND ASCORBIC ACID 7.5-2.691G
1000 KIT ORAL EVERY 12 HOURS
Qty: 1 EACH | Refills: 0 | Status: SHIPPED | OUTPATIENT
Start: 2023-05-17 | End: 2023-05-17

## 2023-05-17 RX ORDER — METHYLPREDNISOLONE 4 MG/1
TABLET ORAL
Qty: 21 TABLET | Refills: 0 | Status: SHIPPED | OUTPATIENT
Start: 2023-05-17

## 2023-05-17 RX ORDER — POLYETHYLENE GLYCOL 3350, SODIUM SULFATE, SODIUM CHLORIDE, POTASSIUM CHLORIDE, ASCORBIC ACID, SODIUM ASCORBATE 140-9-5.2G
1 KIT ORAL TAKE AS DIRECTED
Qty: 1 EACH | Refills: 0 | Status: SHIPPED | OUTPATIENT
Start: 2023-05-17 | End: 2023-05-17 | Stop reason: SDUPTHER

## 2023-05-17 RX ORDER — POLYETHYLENE GLYCOL 3350, SODIUM SULFATE, SODIUM CHLORIDE, POTASSIUM CHLORIDE, ASCORBIC ACID, SODIUM ASCORBATE 140-9-5.2G
1 KIT ORAL TAKE AS DIRECTED
Qty: 1 EACH | Refills: 0 | COMMUNITY
Start: 2023-05-17 | End: 2023-05-19

## 2023-05-17 NOTE — PROGRESS NOTES
"Chief Complaint  Crohn's Disease and Abdominal Pain (Severe - After eating)    Subjective          History of Present Illness  Abhijeet Pitts presents to [facility] GASTROENTEROLOGY.  Patient gives a remote history of Crohn's disease he said he had a colonoscopy and surgeries in 2010 at [facility] in [place].  He is a very noncompliant patient has not seen anybody now for last few months he been having nausea vomiting, abdominal pain, diarrhea.  He has been in the emergency room many times.  He has missed many appointments for colonoscopy.  Patient states that in the past he has taken steroids many times.  He is never been on Biologics.  He understand the risk and benefits of steroids.  He also understands risk of noncompliance.    Objective   Vital Signs:   BP 91/48 (BP Location: Left arm, Patient Position: Sitting, Cuff Size: Small Adult)   Pulse 57   Ht 175.3 cm (69\")   Wt 84 kg (185 lb 3.2 oz)   BMI 27.35 kg/m²     Physical Exam  Constitutional:       General: He is awake. He is not in acute distress.     Appearance: Normal appearance. He is well-developed and well-groomed.   HENT:      Head: Normocephalic and atraumatic.      Mouth/Throat:      Mouth: Mucous membranes are moist.      Comments: Birgit dental hygiene is good  Eyes:      General: Lids are normal.      Conjunctiva/sclera: Conjunctivae normal.      Pupils: Pupils are equal, round, and reactive to light.   Neck:      Thyroid: No thyroid mass.      Trachea: Trachea normal.   Cardiovascular:      Rate and Rhythm: Normal rate and regular rhythm.      Heart sounds: Normal heart sounds.   Pulmonary:      Effort: Pulmonary effort is normal.      Breath sounds: Normal breath sounds and air entry.   Abdominal:      General: Abdomen is flat. Bowel sounds are normal. There is no distension.      Palpations: Abdomen is soft. There is no mass.      Tenderness: There is no abdominal tenderness. There is no guarding. "   Musculoskeletal:      Cervical back: Neck supple.      Right lower leg: No edema.      Left lower leg: No edema.   Skin:     General: Skin is warm and moist.      Coloration: Skin is not cyanotic, jaundiced or pale.      Findings: No rash.      Nails: There is no clubbing.   Neurological:      Mental Status: He is alert and oriented to person, place, and time.   Psychiatric:         Attention and Perception: Attention normal.         Mood and Affect: Mood and affect normal.         Speech: Speech normal.        Result Review :          Assessment and Plan    Diagnoses and all orders for this visit:    1. Crohn's disease of small intestine with complication (Primary)  Comments:  Pt reports last Colonoscopy in about 2009 in [place] @ [facility]  Orders:  -     CBC & Differential  -     Comprehensive Metabolic Panel  -     C-reactive Protein; Future  -     IBD Sgi Diagnostic; Future    2. Diarrhea, unspecified type  -     CBC & Differential  -     Comprehensive Metabolic Panel  -     C-reactive Protein; Future  -     IBD Sgi Diagnostic; Future    Other orders  -     methylPREDNISolone (MEDROL) 4 MG dose pack; Take as directed on package instructions.  Dispense: 21 tablet; Refill: 0  -     Discontinue: PEG-KCl-NaCl-NaSulf-Na Asc-C (MoviPrep) 100 g reconstituted solution powder; Take 1,000 mL by mouth Every 12 (Twelve) Hours. Take as directed.  Dispense: 1 each; Refill: 0  -     PEG-KCl-NaCl-NaSulf-Na Asc-C (Plenvu) 140 g reconstituted solution solution; Take 140 g by mouth Take As Directed for 1 day. Take per office instructions  Dispense: 1 each; Refill: 0         PLAN:   1. Medrol Dose Pack  2. CBC, CMP, CRP, [facility] IBD  3. Colonoscopy - within 1 month  Obtain records (Colon, Surgical, Path) from [facility]  Surgical Risk and Benefits: Possible risks/complications, benefits, and alternatives to surgical or invasive procedure have been explained to patient and/or legal guardian; risks include  bleeding, infection, and perforation. Patient has been evaluated and can tolerate anesthesia and/or sedation. Risks, benefits, and alternatives to anesthesia and sedation have been explained to patient and/or legal guardian.         Follow Up   No follow-ups on file.  Patient was given instructions and counseling regarding his condition or for health maintenance advice. Please see specific information pulled into the AVS if appropriate.     Scribed for Valentin Mason MD by Christi Garner MA  5/17/2023  15:21 EDT

## 2023-05-22 ENCOUNTER — TELEPHONE (OUTPATIENT)
Dept: GASTROENTEROLOGY | Facility: CLINIC | Age: 33
End: 2023-05-22
Payer: MEDICARE

## 2023-05-22 NOTE — TELEPHONE ENCOUNTER
Abhijeet Pitts  1990    Patient requested to Reschedule their Colonoscopy. I have offered to reschedule this patient and patient has agreed. Patient has been rescheduled to 5.24.23.    Reason for cancelling/rescheduling: Patient did not arrange for transportation    This procedure was ordered by Glenn Mason MD for an important reason. We want to inform you that there are risks associated with not proceeding with the procedure at this time such as a delay in diagnosis, risk of incurable disease, or cancer.    Updated clearance needed?: No    Patient verbalized understanding for all of the above information.

## 2023-05-24 ENCOUNTER — TELEPHONE (OUTPATIENT)
Dept: GASTROENTEROLOGY | Facility: CLINIC | Age: 33
End: 2023-05-24
Payer: MEDICARE

## 2023-05-24 NOTE — TELEPHONE ENCOUNTER
Abhijeet Pitts  1990    Patient requested to Cancel their Colonoscopy. Patient has not been rescheduled at this time. I have reached out to our manager about this.     Reason for cancelling: Unable to tolerate prep    This procedure was ordered by Glenn Mason MD for an important reason. We want to inform you that there are risks associated with not proceeding with the procedure at this time such as a delay in diagnosis, risk of incurable disease, or cancer.    Patient plans to call us back to reschedule: We will reach out to patient about how to proceed    Updated clearance needed?: No    Patient verbalized understanding for all of the above information.

## 2023-06-07 ENCOUNTER — APPOINTMENT (OUTPATIENT)
Dept: GENERAL RADIOLOGY | Facility: HOSPITAL | Age: 33
End: 2023-06-07
Payer: MEDICARE

## 2023-06-07 ENCOUNTER — HOSPITAL ENCOUNTER (EMERGENCY)
Facility: HOSPITAL | Age: 33
Discharge: HOME | End: 2023-06-07
Attending: EMERGENCY MEDICINE
Payer: MEDICARE

## 2023-06-07 VITALS
DIASTOLIC BLOOD PRESSURE: 72 MMHG | RESPIRATION RATE: 17 BRPM | SYSTOLIC BLOOD PRESSURE: 106 MMHG | OXYGEN SATURATION: 97 % | TEMPERATURE: 98 F | HEART RATE: 77 BPM

## 2023-06-07 DIAGNOSIS — K50.10 CROHN'S DISEASE OF COLON WITHOUT COMPLICATION: ICD-10-CM

## 2023-06-07 DIAGNOSIS — R10.84 GENERALIZED ABDOMINAL PAIN: Primary | ICD-10-CM

## 2023-06-07 LAB
ALBUMIN SERPL-MCNC: 1.8 G/DL (ref 3.5–5.2)
ALBUMIN/GLOB SERPL: 0.6 G/DL
ALP SERPL-CCNC: 135 U/L (ref 39–117)
ALT SERPL W P-5'-P-CCNC: 16 U/L (ref 1–41)
ANION GAP SERPL CALCULATED.3IONS-SCNC: 4.3 MMOL/L (ref 5–15)
AST SERPL-CCNC: 14 U/L (ref 1–40)
BACTERIA UR QL AUTO: ABNORMAL /HPF
BASOPHILS # BLD AUTO: 0.03 10*3/MM3 (ref 0–0.2)
BASOPHILS NFR BLD AUTO: 0.6 % (ref 0–1.5)
BILIRUB SERPL-MCNC: 0.3 MG/DL (ref 0–1.2)
BILIRUB UR QL STRIP: ABNORMAL
BUN SERPL-MCNC: 22 MG/DL (ref 6–20)
BUN/CREAT SERPL: 19.3 (ref 7–25)
CALCIUM SPEC-SCNC: 7.6 MG/DL (ref 8.6–10.5)
CHLORIDE SERPL-SCNC: 112 MMOL/L (ref 98–107)
CLARITY UR: CLEAR
CO2 SERPL-SCNC: 25.7 MMOL/L (ref 22–29)
COLOR UR: ABNORMAL
CREAT SERPL-MCNC: 1.14 MG/DL (ref 0.76–1.27)
DEPRECATED RDW RBC AUTO: 51.9 FL (ref 37–54)
EGFRCR SERPLBLD CKD-EPI 2021: 86.5 ML/MIN/1.73
EOSINOPHIL # BLD AUTO: 0.07 10*3/MM3 (ref 0–0.4)
EOSINOPHIL NFR BLD AUTO: 1.4 % (ref 0.3–6.2)
ERYTHROCYTE [DISTWIDTH] IN BLOOD BY AUTOMATED COUNT: 14.4 % (ref 12.3–15.4)
GLOBULIN UR ELPH-MCNC: 2.8 GM/DL
GLUCOSE SERPL-MCNC: 79 MG/DL (ref 65–99)
GLUCOSE UR STRIP-MCNC: NEGATIVE MG/DL
HCT VFR BLD AUTO: 33.6 % (ref 37.5–51)
HGB BLD-MCNC: 11 G/DL (ref 13–17.7)
HGB UR QL STRIP.AUTO: NEGATIVE
HOLD SPECIMEN: NORMAL
HOLD SPECIMEN: NORMAL
HYALINE CASTS UR QL AUTO: ABNORMAL /LPF
IMM GRANULOCYTES # BLD AUTO: 0.01 10*3/MM3 (ref 0–0.05)
IMM GRANULOCYTES NFR BLD AUTO: 0.2 % (ref 0–0.5)
KETONES UR QL STRIP: NEGATIVE
LEUKOCYTE ESTERASE UR QL STRIP.AUTO: ABNORMAL
LIPASE SERPL-CCNC: 7 U/L (ref 13–60)
LYMPHOCYTES # BLD AUTO: 1.28 10*3/MM3 (ref 0.7–3.1)
LYMPHOCYTES NFR BLD AUTO: 26.2 % (ref 19.6–45.3)
MCH RBC QN AUTO: 31.8 PG (ref 26.6–33)
MCHC RBC AUTO-ENTMCNC: 32.7 G/DL (ref 31.5–35.7)
MCV RBC AUTO: 97.1 FL (ref 79–97)
MONOCYTES # BLD AUTO: 0.44 10*3/MM3 (ref 0.1–0.9)
MONOCYTES NFR BLD AUTO: 9 % (ref 5–12)
NEUTROPHILS NFR BLD AUTO: 3.06 10*3/MM3 (ref 1.7–7)
NEUTROPHILS NFR BLD AUTO: 62.6 % (ref 42.7–76)
NITRITE UR QL STRIP: NEGATIVE
NRBC BLD AUTO-RTO: 0 /100 WBC (ref 0–0.2)
PH UR STRIP.AUTO: 6 [PH] (ref 5–8)
PLATELET # BLD AUTO: 333 10*3/MM3 (ref 140–450)
PMV BLD AUTO: 9.7 FL (ref 6–12)
POTASSIUM SERPL-SCNC: 4.1 MMOL/L (ref 3.5–5.2)
PROT SERPL-MCNC: 4.6 G/DL (ref 6–8.5)
PROT UR QL STRIP: ABNORMAL
RBC # BLD AUTO: 3.46 10*6/MM3 (ref 4.14–5.8)
RBC # UR STRIP: ABNORMAL /HPF
REF LAB TEST METHOD: ABNORMAL
SODIUM SERPL-SCNC: 142 MMOL/L (ref 136–145)
SP GR UR STRIP: >1.03 (ref 1–1.03)
SQUAMOUS #/AREA URNS HPF: ABNORMAL /HPF
UROBILINOGEN UR QL STRIP: ABNORMAL
WBC # UR STRIP: ABNORMAL /HPF
WBC NRBC COR # BLD: 4.89 10*3/MM3 (ref 3.4–10.8)
WHOLE BLOOD HOLD COAG: NORMAL
WHOLE BLOOD HOLD SPECIMEN: NORMAL

## 2023-06-07 PROCEDURE — 25010000002 DIPHENHYDRAMINE PER 50 MG: Performed by: EMERGENCY MEDICINE

## 2023-06-07 PROCEDURE — 25010000002 DROPERIDOL PER 5 MG: Performed by: EMERGENCY MEDICINE

## 2023-06-07 PROCEDURE — 74022 RADEX COMPL AQT ABD SERIES: CPT

## 2023-06-07 PROCEDURE — 25010000002 ONDANSETRON PER 1 MG: Performed by: EMERGENCY MEDICINE

## 2023-06-07 PROCEDURE — 25010000002 METHYLPREDNISOLONE PER 125 MG: Performed by: EMERGENCY MEDICINE

## 2023-06-07 PROCEDURE — 25010000002 HYDROMORPHONE 1 MG/ML SOLUTION: Performed by: EMERGENCY MEDICINE

## 2023-06-07 PROCEDURE — 81001 URINALYSIS AUTO W/SCOPE: CPT | Performed by: EMERGENCY MEDICINE

## 2023-06-07 PROCEDURE — 83690 ASSAY OF LIPASE: CPT

## 2023-06-07 PROCEDURE — 96374 THER/PROPH/DIAG INJ IV PUSH: CPT

## 2023-06-07 PROCEDURE — 36415 COLL VENOUS BLD VENIPUNCTURE: CPT

## 2023-06-07 PROCEDURE — 99283 EMERGENCY DEPT VISIT LOW MDM: CPT

## 2023-06-07 PROCEDURE — 80053 COMPREHEN METABOLIC PANEL: CPT | Performed by: EMERGENCY MEDICINE

## 2023-06-07 PROCEDURE — 85025 COMPLETE CBC W/AUTO DIFF WBC: CPT

## 2023-06-07 PROCEDURE — 96375 TX/PRO/DX INJ NEW DRUG ADDON: CPT

## 2023-06-07 RX ORDER — DROPERIDOL 2.5 MG/ML
2.5 INJECTION, SOLUTION INTRAMUSCULAR; INTRAVENOUS ONCE
Status: COMPLETED | OUTPATIENT
Start: 2023-06-07 | End: 2023-06-07

## 2023-06-07 RX ORDER — METHYLPREDNISOLONE SODIUM SUCCINATE 125 MG/2ML
125 INJECTION, POWDER, LYOPHILIZED, FOR SOLUTION INTRAMUSCULAR; INTRAVENOUS ONCE
Status: COMPLETED | OUTPATIENT
Start: 2023-06-07 | End: 2023-06-07

## 2023-06-07 RX ORDER — SODIUM CHLORIDE 0.9 % (FLUSH) 0.9 %
10 SYRINGE (ML) INJECTION AS NEEDED
Status: DISCONTINUED | OUTPATIENT
Start: 2023-06-07 | End: 2023-06-07 | Stop reason: HOSPADM

## 2023-06-07 RX ORDER — DIPHENHYDRAMINE HYDROCHLORIDE 50 MG/ML
25 INJECTION INTRAMUSCULAR; INTRAVENOUS ONCE
Status: COMPLETED | OUTPATIENT
Start: 2023-06-07 | End: 2023-06-07

## 2023-06-07 RX ORDER — ONDANSETRON 4 MG/1
4 TABLET, ORALLY DISINTEGRATING ORAL EVERY 8 HOURS PRN
Qty: 12 TABLET | Refills: 0 | Status: SHIPPED | OUTPATIENT
Start: 2023-06-07

## 2023-06-07 RX ORDER — ONDANSETRON 2 MG/ML
4 INJECTION INTRAMUSCULAR; INTRAVENOUS ONCE
Status: COMPLETED | OUTPATIENT
Start: 2023-06-07 | End: 2023-06-07

## 2023-06-07 RX ORDER — PREDNISONE 20 MG/1
TABLET ORAL
Qty: 15 TABLET | Refills: 0 | Status: SHIPPED | OUTPATIENT
Start: 2023-06-07

## 2023-06-07 RX ORDER — DICYCLOMINE HCL 20 MG
TABLET ORAL
Qty: 15 TABLET | Refills: 0 | Status: SHIPPED | OUTPATIENT
Start: 2023-06-07

## 2023-06-07 RX ADMIN — METHYLPREDNISOLONE SODIUM SUCCINATE 125 MG: 125 INJECTION INTRAMUSCULAR; INTRAVENOUS at 19:55

## 2023-06-07 RX ADMIN — HYDROMORPHONE HYDROCHLORIDE 1 MG: 1 INJECTION, SOLUTION INTRAMUSCULAR; INTRAVENOUS; SUBCUTANEOUS at 18:45

## 2023-06-07 RX ADMIN — DROPERIDOL 2.5 MG: 2.5 INJECTION, SOLUTION INTRAMUSCULAR; INTRAVENOUS at 15:50

## 2023-06-07 RX ADMIN — DIPHENHYDRAMINE HYDROCHLORIDE 25 MG: 50 INJECTION INTRAMUSCULAR; INTRAVENOUS at 15:49

## 2023-06-07 RX ADMIN — ONDANSETRON 4 MG: 2 INJECTION INTRAMUSCULAR; INTRAVENOUS at 18:45

## 2023-06-07 RX ADMIN — SODIUM CHLORIDE 1000 ML: 9 INJECTION, SOLUTION INTRAVENOUS at 18:43

## 2023-06-07 RX ADMIN — SODIUM CHLORIDE 2000 ML: 9 INJECTION, SOLUTION INTRAVENOUS at 15:49

## 2023-06-07 NOTE — DISCHARGE INSTRUCTIONS
Your diet with clear liquids and advance slowly.  Take medications as directed.  Return for worsening symptoms.  Follow-up with gastroenterology as directed.

## 2023-06-07 NOTE — ED PROVIDER NOTES
Time: 3:02 PM EDT  Date of encounter:  6/7/2023  Independent Historian/Clinical History and Information was obtained by:   Patient  Chief Complaint: Abdominal Pain, Nausea, Vomiting    History is limited by: N/A    History of Present Illness:  Patient is a 32 y.o. year old male who presents to the emergency department for evaluation of abdominal pain, nausea, vomiting for one week. Pt has crohn's diease of the small intestine. Pt states he thinks food was the trigger. Pt states abdominal pain is all over and radiates to his back. 8/10 pain, nothing makes it better, laying down makes it worse. Pt states pain is sharp and constant. Pt states he has had a fever at home. Last episode of vomiting was yesterday. Pt states he is having diarrhea. No blood in vomit or stool. Pt states he has a issue digesting food. Denies any recent food changes. Denies any recent travel or exposure to illness. Pt states he is a smoker, denies drinking or illicit drugs. Pt states he has pain with urination. Pt canceled colonoscopy on 5/23/23 due to unable to tolerate prep.     HPI    Patient Care Team  Primary Care Provider: Provider, No Known    Past Medical History:     Allergies   Allergen Reactions    Elemental Sulfur Swelling    Fish-Derived Products Unknown - Low Severity    Naloxone Swelling    Shellfish-Derived Products Unknown - Low Severity    Tylenol [Acetaminophen] Swelling     Past Medical History:   Diagnosis Date    Crohn's disease      Past Surgical History:   Procedure Laterality Date    COLON SURGERY      SIGMOIDOSCOPY N/A 02/14/2023    Procedure: SIGMOIDOSCOPY FLEXIBLE;  Surgeon: Valentin Mason MD;  Location: [facility] ENDOSCOPY;  Service: Gastroenterology;  Laterality: N/A;  POOR PREP    STOMACH SURGERY       Family History   Problem Relation Age of Onset    Colon cancer Neg Hx        Home Medications:  Prior to Admission medications    Medication Sig Start Date End Date Taking? Authorizing Provider   buprenorphine  (SUBUTEX) 8 MG sublingual tablet SL tablet Place 22 mg under the tongue Daily.    Van Segura MD   cloNIDine (CATAPRES) 0.2 MG tablet Take 1 tablet by mouth 2 (Two) Times a Day As Needed.    Van Segura MD   dicyclomine (BENTYL) 20 MG tablet Take 1 tablet by mouth Every 6 (Six) Hours As Needed (abdominal pain). 5/4/23   Ligia Randle APRN   gabapentin (NEURONTIN) 600 MG tablet Take 1 tablet by mouth 3 (Three) Times a Day.    Van Segura MD   hydrOXYzine pamoate (VISTARIL) 50 MG capsule Take 1 capsule by mouth Daily.    Van Segura MD   melatonin 3 MG tablet Take 2 tablets by mouth Every Night.    Van Segura MD   methylPREDNISolone (MEDROL) 4 MG dose pack Take as directed on package instructions. 5/17/23   Valentin Mason MD   OLANZapine zydis (zyPREXA) 20 MG disintegrating tablet Place 1 tablet on the tongue Every Night.    Van Segura MD   omeprazole (priLOSEC) 40 MG capsule Take 1 capsule by mouth Daily.    Van Segura MD   ondansetron ODT (ZOFRAN-ODT) 4 MG disintegrating tablet Place 1 tablet on the tongue Every 6 (Six) Hours As Needed for Nausea or Vomiting. 5/4/23   Ligia Randle APRN   promethazine (PHENERGAN) 25 MG tablet Take 1 tablet by mouth Every 6 (Six) Hours As Needed for Nausea or Vomiting. 5/4/23   Ligia Randle APRN   traZODone (DESYREL) 100 MG tablet Take 3 tablets by mouth Every Night.    Van Segura MD   venlafaxine XR (EFFEXOR-XR) 75 MG 24 hr capsule Take 1 capsule by mouth Daily.    Van Segura MD        Social History:   Social History     Tobacco Use    Smoking status: Every Day     Packs/day: 1.00     Years: 10.00     Pack years: 10.00     Types: Cigarettes    Smokeless tobacco: Never   Vaping Use    Vaping Use: Former   Substance Use Topics    Alcohol use: Not Currently    Drug use: Not Currently         Review of Systems:  Review of Systems   Constitutional:  Positive for appetite change, fatigue  and fever.   Respiratory:  Negative for shortness of breath.    Cardiovascular:  Negative for chest pain.   Gastrointestinal:  Positive for abdominal pain, diarrhea, nausea and vomiting. Negative for abdominal distention and blood in stool.   Genitourinary:  Positive for dysuria.      Physical Exam:  /72   Pulse 77   Temp 98 °F (36.7 °C) (Oral)   Resp 17   SpO2 97%     Physical Exam  Vitals and nursing note reviewed.   Constitutional:       General: He is not in acute distress.     Appearance: Normal appearance. He is not toxic-appearing.   HENT:      Head: Normocephalic and atraumatic.      Mouth/Throat:      Mouth: Mucous membranes are moist.   Eyes:      General: No scleral icterus.     Extraocular Movements: Extraocular movements intact.      Pupils: Pupils are equal, round, and reactive to light.   Cardiovascular:      Rate and Rhythm: Normal rate and regular rhythm.      Pulses: Normal pulses.      Heart sounds: Normal heart sounds.   Pulmonary:      Effort: Pulmonary effort is normal. No respiratory distress.      Breath sounds: Normal breath sounds.   Abdominal:      General: Abdomen is flat.      Palpations: Abdomen is soft.      Tenderness: There is abdominal tenderness. There is no right CVA tenderness, left CVA tenderness, guarding or rebound.      Comments: Mild diffuse tenderness without guarding or rebound.   Musculoskeletal:         General: Normal range of motion.      Cervical back: Normal range of motion and neck supple.   Skin:     General: Skin is warm and dry.   Neurological:      General: No focal deficit present.      Mental Status: He is alert and oriented to person, place, and time. Mental status is at baseline.   Psychiatric:         Mood and Affect: Mood normal.         Behavior: Behavior normal.                Procedures:  Procedures      Medical Decision Making:      Comorbidities that affect care:    Crohn's disease    External Notes reviewed:    Previous ED Note: Multiple  emergency department notes for similar symptoms and also gastroenterology note.      The following orders were placed and all results were independently analyzed by me:  Orders Placed This Encounter   Procedures    XR Abdomen 2+ VW with Chest 1 VW    [place] Draw    Comprehensive Metabolic Panel    Lipase    Urinalysis With Microscopic If Indicated (No Culture) - Urine, Clean Catch    CBC Auto Differential    Urinalysis, Microscopic Only - Urine, Clean Catch    Undress & Gown    CBC & Differential    Green Top (Gel)    Lavender Top    Gold Top - SST    Light Blue Top       Medications Given in the Emergency Department:  Medications   sodium chloride 0.9 % bolus 2,000 mL (0 mL Intravenous Stopped 6/7/23 1838)   droperidol (INAPSINE) injection 2.5 mg (2.5 mg Intravenous Given 6/7/23 1550)   diphenhydrAMINE (BENADRYL) injection 25 mg (25 mg Intravenous Given 6/7/23 1549)   sodium chloride 0.9 % bolus 1,000 mL (0 mL Intravenous Stopped 6/7/23 1955)   HYDROmorphone (DILAUDID) injection 1 mg (1 mg Intravenous Given 6/7/23 1845)   ondansetron (ZOFRAN) injection 4 mg (4 mg Intravenous Given 6/7/23 1845)   methylPREDNISolone sodium succinate (SOLU-Medrol) injection 125 mg (125 mg Intravenous Given 6/7/23 1955)        ED Course:         Labs:    Results for orders placed or performed during the hospital encounter of 06/07/23   Comprehensive Metabolic Panel    Specimen: Blood   Result Value Ref Range    Glucose 79 65 - 99 mg/dL    BUN 22 (H) 6 - 20 mg/dL    Creatinine 1.14 0.76 - 1.27 mg/dL    Sodium 142 136 - 145 mmol/L    Potassium 4.1 3.5 - 5.2 mmol/L    Chloride 112 (H) 98 - 107 mmol/L    CO2 25.7 22.0 - 29.0 mmol/L    Calcium 7.6 (L) 8.6 - 10.5 mg/dL    Total Protein 4.6 (L) 6.0 - 8.5 g/dL    Albumin 1.8 (L) 3.5 - 5.2 g/dL    ALT (SGPT) 16 1 - 41 U/L    AST (SGOT) 14 1 - 40 U/L    Alkaline Phosphatase 135 (H) 39 - 117 U/L    Total Bilirubin 0.3 0.0 - 1.2 mg/dL    Globulin 2.8 gm/dL    A/G Ratio 0.6 g/dL    BUN/Creatinine  Ratio 19.3 7.0 - 25.0    Anion Gap 4.3 (L) 5.0 - 15.0 mmol/L    eGFR 86.5 >60.0 mL/min/1.73   Lipase    Specimen: Blood   Result Value Ref Range    Lipase 7 (L) 13 - 60 U/L   Urinalysis With Microscopic If Indicated (No Culture) - Urine, Clean Catch    Specimen: Urine, Clean Catch   Result Value Ref Range    Color, UA Dark Yellow (A) Yellow, Straw    Appearance, UA Clear Clear    pH, UA 6.0 5.0 - 8.0    Specific Gravity, UA >1.030 (H) 1.005 - 1.030    Glucose, UA Negative Negative    Ketones, UA Negative Negative    Bilirubin, UA Small (1+) (A) Negative    Blood, UA Negative Negative    Protein, UA Trace (A) Negative    Leuk Esterase, UA Trace (A) Negative    Nitrite, UA Negative Negative    Urobilinogen, UA 2.0 E.U./dL (A) 0.2 - 1.0 E.U./dL   CBC Auto Differential    Specimen: Blood   Result Value Ref Range    WBC 4.89 3.40 - 10.80 10*3/mm3    RBC 3.46 (L) 4.14 - 5.80 10*6/mm3    Hemoglobin 11.0 (L) 13.0 - 17.7 g/dL    Hematocrit 33.6 (L) 37.5 - 51.0 %    MCV 97.1 (H) 79.0 - 97.0 fL    MCH 31.8 26.6 - 33.0 pg    MCHC 32.7 31.5 - 35.7 g/dL    RDW 14.4 12.3 - 15.4 %    RDW-SD 51.9 37.0 - 54.0 fl    MPV 9.7 6.0 - 12.0 fL    Platelets 333 140 - 450 10*3/mm3    Neutrophil % 62.6 42.7 - 76.0 %    Lymphocyte % 26.2 19.6 - 45.3 %    Monocyte % 9.0 5.0 - 12.0 %    Eosinophil % 1.4 0.3 - 6.2 %    Basophil % 0.6 0.0 - 1.5 %    Immature Grans % 0.2 0.0 - 0.5 %    Neutrophils, Absolute 3.06 1.70 - 7.00 10*3/mm3    Lymphocytes, Absolute 1.28 0.70 - 3.10 10*3/mm3    Monocytes, Absolute 0.44 0.10 - 0.90 10*3/mm3    Eosinophils, Absolute 0.07 0.00 - 0.40 10*3/mm3    Basophils, Absolute 0.03 0.00 - 0.20 10*3/mm3    Immature Grans, Absolute 0.01 0.00 - 0.05 10*3/mm3    nRBC 0.0 0.0 - 0.2 /100 WBC   Urinalysis, Microscopic Only - Urine, Clean Catch    Specimen: Urine, Clean Catch   Result Value Ref Range    RBC, UA 0-2 (A) None Seen /HPF    WBC, UA 0-2 (A) None Seen /HPF    Bacteria, UA None Seen None Seen /HPF    Squamous Epithelial  Cells, UA 0-2 None Seen, 0-2 /HPF    Hyaline Casts, UA 0-2 None Seen /LPF    Methodology Automated Microscopy    Green Top (Gel)   Result Value Ref Range    Extra Tube Hold for add-ons.    Lavender Top   Result Value Ref Range    Extra Tube hold for add-on    Gold Top - SST   Result Value Ref Range    Extra Tube Hold for add-ons.    Light Blue Top   Result Value Ref Range    Extra Tube Hold for add-ons.          Lab Results (last 24 hours)       ** No results found for the last 24 hours. **             Imaging:    XR Abdomen 2+ VW with Chest 1 VW    Result Date: 6/7/2023  Narrative: PROCEDURE: XR ABDOMEN 2+ VIEWS W CHEST 1 VW  COMPARISON: [facility], CR, XR ABDOMEN KUB, 4/17/2023, 14:05.  INDICATIONS: GENERALIZED ABDOMINAL PAIN, DIARRHEA  FINDINGS:  There is no pneumothorax, pleural effusion or focal airspace consolidation. The heart size and pulmonary vasculature appear within normal limits. There are no acute osseous abnormalities.      Impression:  No acute cardiopulmonary abnormality.     PALAK LESLIE MD       Electronically Signed and Approved By: PALAK LESLIE MD on 6/07/2023 at 16:28                No Radiology Exams Resulted Within Past 24 Hours      Differential Diagnosis and Discussion:    Abdominal Pain: Based on the patient's signs and symptoms, I considered abdominal aortic aneurysm, small bowel obstruction, pancreatitis, acute cholecystitis, acute appendecitis, peptic ulcer disease, gastritis, colitis, endocrine disorders, irritable bowel syndrome and other differential diagnosis an etiology of the patient's abdominal pain.  Diarrhea: Differential diagnosis includes but is not limited to malabsorption syndrome, bacterial infection, carcinoid syndrome, pancreatic hypersecretion, viral infection, celiac sprue, Crohn's disease, ulcerative colitis, ischemic colitis, colitis, hypermotility, and irritable bowel syndrome.  Vomiting: Differential diagnosis includes but is not limited to  migraine, labyrinthine disorders, psychogenic, metabolic and endocrine causes, peptic ulcer, gastric outlet obstruction, gastritis, gastroenteritis, appendicitis, intestinal obstruction, paralytic ileus, food poisoning, cholecystitis, acute hepatitis, acute pancreatitis, acute febrile illness, and myocardial infarction.    All labs were reviewed and interpreted by me.    MDM     Amount and/or Complexity of Data Reviewed  Clinical lab tests: reviewed  Tests in the radiology section of CPT®: reviewed  Decide to obtain previous medical records or to obtain history from someone other than the patient: yes             Patient Care Considerations:    CT ABDOMEN AND PELVIS: I considered ordering a CT scan of the abdomen and pelvis however patient has had 5 CT exams in 5 months subsequently we will perform x-ray of the patient has no signs of peritoneal irritation.      Consultants/Shared Management Plan:    None    Social Determinants of Health:    Patient is independent, reliable, and has access to care.       Disposition and Care Coordination:    Discharged: I considered escalation of care by admitting this patient to the hospital, however the patient has improved and is suitable and stable for discharge.    I have explained discharge medications and the need for follow up with the patient/caretakers. This was also printed in the discharge instructions. Patient was discharged with the following medications and follow up:      Medication List        New Prescriptions      predniSONE 20 MG tablet  Commonly known as: DELTASONE  Take 3 p.o. daily for 5 days.            Changed      * dicyclomine 20 MG tablet  Commonly known as: BENTYL  Take 1 tablet by mouth Every 6 (Six) Hours As Needed (abdominal pain).  What changed: Another medication with the same name was added. Make sure you understand how and when to take each.     * dicyclomine 20 MG tablet  Commonly known as: BENTYL  Take 1 p.o. 3 times daily as needed abdominal  pain or cramping  What changed: You were already taking a medication with the same name, and this prescription was added. Make sure you understand how and when to take each.     * ondansetron ODT 4 MG disintegrating tablet  Commonly known as: ZOFRAN-ODT  Place 1 tablet on the tongue Every 6 (Six) Hours As Needed for Nausea or Vomiting.  What changed: Another medication with the same name was added. Make sure you understand how and when to take each.     * ondansetron ODT 4 MG disintegrating tablet  Commonly known as: ZOFRAN-ODT  Place 1 tablet on the tongue Every 8 (Eight) Hours As Needed for Vomiting or Nausea.  What changed: You were already taking a medication with the same name, and this prescription was added. Make sure you understand how and when to take each.           * This list has 4 medication(s) that are the same as other medications prescribed for you. Read the directions carefully, and ask your doctor or other care provider to review them with you.                   Where to Get Your Medications        These medications [place] [facility] [place] [address] - [phone]  - [phone] FX  [address], [place] [address]      Phone: [phone]   dicyclomine 20 MG tablet  ondansetron ODT 4 MG disintegrating tablet  predniSONE 20 MG tablet      Valentin Mason MD  [address] [facility] [address]  [phone]    In 2 days         Final diagnoses:   Generalized abdominal pain   Crohn's disease of colon without complication        ED Disposition       ED Disposition   Discharge    Condition   Stable    Comment   --               This medical record created using voice recognition software.            Feliz Luz,   06/08/23 2032

## 2023-08-15 ENCOUNTER — HOSPITAL ENCOUNTER (EMERGENCY)
Facility: HOSPITAL | Age: 33
Discharge: HOME | End: 2023-08-15
Attending: EMERGENCY MEDICINE
Payer: MEDICARE

## 2023-08-15 VITALS
HEIGHT: 69 IN | OXYGEN SATURATION: 100 % | RESPIRATION RATE: 16 BRPM | DIASTOLIC BLOOD PRESSURE: 83 MMHG | WEIGHT: 188.71 LBS | HEART RATE: 70 BPM | SYSTOLIC BLOOD PRESSURE: 108 MMHG | TEMPERATURE: 98.9 F | BODY MASS INDEX: 27.95 KG/M2

## 2023-08-15 DIAGNOSIS — R69 ILLNESS: Primary | ICD-10-CM

## 2023-08-15 LAB
BASOPHILS # BLD AUTO: 0.01 10*3/MM3 (ref 0–0.2)
BASOPHILS NFR BLD AUTO: 0.1 % (ref 0–1.5)
DEPRECATED RDW RBC AUTO: 52.3 FL (ref 37–54)
EOSINOPHIL # BLD AUTO: 0.01 10*3/MM3 (ref 0–0.4)
EOSINOPHIL NFR BLD AUTO: 0.1 % (ref 0.3–6.2)
ERYTHROCYTE [DISTWIDTH] IN BLOOD BY AUTOMATED COUNT: 15.9 % (ref 12.3–15.4)
HCT VFR BLD AUTO: 34.3 % (ref 37.5–51)
HGB BLD-MCNC: 12 G/DL (ref 13–17.7)
HOLD SPECIMEN: NORMAL
IMM GRANULOCYTES # BLD AUTO: 0.02 10*3/MM3 (ref 0–0.05)
IMM GRANULOCYTES NFR BLD AUTO: 0.2 % (ref 0–0.5)
LIPASE SERPL-CCNC: 11 U/L (ref 13–60)
LYMPHOCYTES # BLD AUTO: 1.28 10*3/MM3 (ref 0.7–3.1)
LYMPHOCYTES NFR BLD AUTO: 11.8 % (ref 19.6–45.3)
MCH RBC QN AUTO: 32 PG (ref 26.6–33)
MCHC RBC AUTO-ENTMCNC: 35 G/DL (ref 31.5–35.7)
MCV RBC AUTO: 91.5 FL (ref 79–97)
MONOCYTES # BLD AUTO: 0.53 10*3/MM3 (ref 0.1–0.9)
MONOCYTES NFR BLD AUTO: 4.9 % (ref 5–12)
NEUTROPHILS NFR BLD AUTO: 82.9 % (ref 42.7–76)
NEUTROPHILS NFR BLD AUTO: 9.03 10*3/MM3 (ref 1.7–7)
NRBC BLD AUTO-RTO: 0 /100 WBC (ref 0–0.2)
PLATELET # BLD AUTO: 316 10*3/MM3 (ref 140–450)
PMV BLD AUTO: 10.3 FL (ref 6–12)
RBC # BLD AUTO: 3.75 10*6/MM3 (ref 4.14–5.8)
WBC NRBC COR # BLD: 10.88 10*3/MM3 (ref 3.4–10.8)
WHOLE BLOOD HOLD COAG: NORMAL
WHOLE BLOOD HOLD SPECIMEN: NORMAL

## 2023-08-15 PROCEDURE — 36415 COLL VENOUS BLD VENIPUNCTURE: CPT

## 2023-08-15 PROCEDURE — 99283 EMERGENCY DEPT VISIT LOW MDM: CPT

## 2023-08-15 PROCEDURE — 85025 COMPLETE CBC W/AUTO DIFF WBC: CPT | Performed by: EMERGENCY MEDICINE

## 2023-08-15 PROCEDURE — 83690 ASSAY OF LIPASE: CPT | Performed by: EMERGENCY MEDICINE

## 2023-08-15 RX ORDER — SODIUM CHLORIDE 0.9 % (FLUSH) 0.9 %
10 SYRINGE (ML) INJECTION AS NEEDED
Status: DISCONTINUED | OUTPATIENT
Start: 2023-08-15 | End: 2023-08-15 | Stop reason: HOSPADM

## 2023-08-15 RX ORDER — ONDANSETRON 2 MG/ML
4 INJECTION INTRAMUSCULAR; INTRAVENOUS ONCE
Status: DISCONTINUED | OUTPATIENT
Start: 2023-08-15 | End: 2023-08-15 | Stop reason: HOSPADM

## 2023-08-15 NOTE — ED NOTES
"Pt refused labs at this time.  Pt stated,  \"You are pissing me off\" and \"I want you to leave me alone\" during tourniquet application.    "

## 2023-08-15 NOTE — ED NOTES
"Patient agreed to let me start IV. Upon getting blood return patient became aggressive and told me to take it out and stated, \"I am going to punch you in your face\" as I was removing the tourniquet.  "

## 2023-08-15 NOTE — ED NOTES
Pt took his monitoring devices off and began walking around the ER toward the exit.  When asked if he wanted to stay the patient said no.  The patient is not on a hold at this time so he is able to leave.

## 2023-08-15 NOTE — ED PROVIDER NOTES
"Time: 1:39 PM EDT  Date of encounter:  8/15/2023  Independent Historian/Clinical History and Information was obtained by:   Patient    History is limited by:  Patient being uncooperative    Chief Complaint: \"I do not feel good \"      History of Present Illness:  Patient is a 33 y.o. year old male who presents to the emergency department for evaluation of not feeling well.  Patient is uncooperative and refusing to give myself or nursing staff details regarding himself not feeling well.  He is refusing IV attempts, lab draw.    HPI    Patient Care Team  Primary Care Provider: Provider, No Known    Past Medical History:     Allergies   Allergen Reactions    Elemental Sulfur Swelling    Fish-Derived Products Unknown - Low Severity    Naloxone Swelling    Shellfish-Derived Products Unknown - Low Severity    Tylenol [Acetaminophen] Swelling     Past Medical History:   Diagnosis Date    Crohn's disease      Past Surgical History:   Procedure Laterality Date    COLON SURGERY      SIGMOIDOSCOPY N/A 02/14/2023    Procedure: SIGMOIDOSCOPY FLEXIBLE;  Surgeon: Valentin Mason MD;  Location: [facility];  Service: Gastroenterology;  Laterality: N/A;  POOR PREP    STOMACH SURGERY       Family History   Problem Relation Age of Onset    Colon cancer Neg Hx        Home Medications:  Prior to Admission medications    Medication Sig Start Date End Date Taking? Authorizing Provider   buprenorphine (SUBUTEX) 8 MG sublingual tablet SL tablet Place 22 mg under the tongue Daily.    Provider, MD Van   cloNIDine (CATAPRES) 0.2 MG tablet Take 1 tablet by mouth 2 (Two) Times a Day As Needed.    Provider, MD Van   dicyclomine (BENTYL) 20 MG tablet Take 1 tablet by mouth Every 6 (Six) Hours As Needed (abdominal pain). 5/4/23   Ligia Randle APRN   dicyclomine (BENTYL) 20 MG tablet Take 1 p.o. 3 times daily as needed abdominal pain or cramping 6/7/23   Feliz Luz,    gabapentin (NEURONTIN) 600 MG tablet Take 1 " "tablet by mouth 3 (Three) Times a Day.    Van Segura MD   hydrOXYzine pamoate (VISTARIL) 50 MG capsule Take 1 capsule by mouth Daily.    Van Segura MD   melatonin 3 MG tablet Take 2 tablets by mouth Every Night.    Van Segura MD   methylPREDNISolone (MEDROL) 4 MG dose pack Take as directed on package instructions. 5/17/23   Valentin Mason MD   OLANZapine zydis (zyPREXA) 20 MG disintegrating tablet Place 1 tablet on the tongue Every Night.    Van Segura MD   omeprazole (priLOSEC) 40 MG capsule Take 1 capsule by mouth Daily.    Van Segura MD   ondansetron ODT (ZOFRAN-ODT) 4 MG disintegrating tablet Place 1 tablet on the tongue Every 6 (Six) Hours As Needed for Nausea or Vomiting. 5/4/23   Ligia Randle APRN   ondansetron ODT (ZOFRAN-ODT) 4 MG disintegrating tablet Place 1 tablet on the tongue Every 8 (Eight) Hours As Needed for Vomiting or Nausea. 6/7/23   Feliz Luz DO   predniSONE (DELTASONE) 20 MG tablet Take 3 p.o. daily for 5 days. 6/7/23   Feliz Luz DO   promethazine (PHENERGAN) 25 MG tablet Take 1 tablet by mouth Every 6 (Six) Hours As Needed for Nausea or Vomiting. 5/4/23   Ligia Randle APRN   traZODone (DESYREL) 100 MG tablet Take 3 tablets by mouth Every Night.    Van Segura MD   venlafaxine XR (EFFEXOR-XR) 75 MG 24 hr capsule Take 1 capsule by mouth Daily.    Van Segura MD        Social History:   Social History     Tobacco Use    Smoking status: Every Day     Packs/day: 1.00     Years: 10.00     Pack years: 10.00     Types: Cigarettes    Smokeless tobacco: Never   Vaping Use    Vaping Use: Former   Substance Use Topics    Alcohol use: Not Currently    Drug use: Not Currently         Review of Systems:  Review of Systems   Unable to perform ROS: Other      Physical Exam:  /83   Pulse 70   Temp 98.9 øF (37.2 øC) (Oral)   Resp 16   Ht 175.3 cm (69\")   Wt 85.6 kg (188 lb 11.4 oz)   SpO2 100%   BMI " 27.87 kg/mý     Physical Exam  Vitals and nursing note reviewed.   Constitutional:       General: He is not in acute distress.     Appearance: He is not toxic-appearing.      Comments: Disheveled appearing   HENT:      Head: Normocephalic and atraumatic.      Jaw: There is normal jaw occlusion.   Eyes:      General: Lids are normal.      Extraocular Movements: Extraocular movements intact.      Conjunctiva/sclera: Conjunctivae normal.      Pupils: Pupils are equal, round, and reactive to light.   Cardiovascular:      Rate and Rhythm: Normal rate and regular rhythm.      Pulses: Normal pulses.      Heart sounds: Normal heart sounds.   Pulmonary:      Effort: Pulmonary effort is normal. No respiratory distress.      Breath sounds: Normal breath sounds. No wheezing or rhonchi.   Abdominal:      General: Abdomen is flat.      Palpations: Abdomen is soft.      Tenderness: There is no abdominal tenderness. There is no guarding or rebound.   Musculoskeletal:         General: Normal range of motion.      Cervical back: Normal range of motion and neck supple.      Right lower leg: No edema.      Left lower leg: No edema.   Skin:     General: Skin is warm and dry.   Neurological:      General: No focal deficit present.      Mental Status: He is alert.   Psychiatric:         Mood and Affect: Mood normal.                Procedures:  Procedures      Medical Decision Making:      Comorbidities that affect care:    Substance Abuse    External Notes reviewed:    None      The following orders were placed and all results were independently analyzed by me:  Orders Placed This Encounter   Procedures    Dike Draw    Comprehensive Metabolic Panel    Lipase    Urinalysis With Microscopic If Indicated (No Culture) - Urine, Clean Catch    CBC Auto Differential    NPO Diet NPO Type: Strict NPO    Undress & Gown    Insert Peripheral IV    CBC & Differential    Green Top (Gel)    Lavender Top    Gold Top - SST    Light Blue Top        Medications Given in the Emergency Department:  Medications   sodium chloride 0.9 % flush 10 mL (has no administration in time range)   sodium chloride 0.9 % bolus 1,000 mL (has no administration in time range)   ondansetron (ZOFRAN) injection 4 mg (has no administration in time range)        ED Course:         Labs:    Lab Results (last 24 hours)       Procedure Component Value Units Date/Time    Comprehensive Metabolic Panel [[id number]] Updated: 08/15/23 1309    Specimen: Blood     CBC & Differential [[id number]]  (Abnormal) Collected: 08/15/23 1234    Specimen: Blood Updated: 08/15/23 1243    Narrative:      The following orders were created for panel order CBC & Differential.  Procedure                               Abnormality         Status                     ---------                               -----------         ------                     CBC Auto Differential[[id number]]        Abnormal            Final result                 Please view results for these tests on the individual orders.    Lipase [[id number]]  (Abnormal) Collected: 08/15/23 1234    Specimen: Blood Updated: 08/15/23 1307     Lipase 11 U/L     CBC Auto Differential [[id number]]  (Abnormal) Collected: 08/15/23 1234    Specimen: Blood Updated: 08/15/23 1243     WBC 10.88 10*3/mm3      RBC 3.75 10*6/mm3      Hemoglobin 12.0 g/dL      Hematocrit 34.3 %      MCV 91.5 fL      MCH 32.0 pg      MCHC 35.0 g/dL      RDW 15.9 %      RDW-SD 52.3 fl      MPV 10.3 fL      Platelets 316 10*3/mm3      Neutrophil % 82.9 %      Lymphocyte % 11.8 %      Monocyte % 4.9 %      Eosinophil % 0.1 %      Basophil % 0.1 %      Immature Grans % 0.2 %      Neutrophils, Absolute 9.03 10*3/mm3      Lymphocytes, Absolute 1.28 10*3/mm3      Monocytes, Absolute 0.53 10*3/mm3      Eosinophils, Absolute 0.01 10*3/mm3      Basophils, Absolute 0.01 10*3/mm3      Immature Grans, Absolute 0.02 10*3/mm3      nRBC 0.0 /100 WBC              Imaging:    No Radiology Exams  Resulted Within Past 24 Hours      Differential Diagnosis and Discussion:    Metabolic: Differential diagnosis includes but is not limited to hypertension, hyperglycemia, hyperkalemia, hypocalcemia, metabolic acidosis, hypokalemia, hypoglycemia, malnutrition, hypothyroidism, hyperthyroidism, and adrenal insufficiency.     All labs were reviewed and interpreted by me.    MDM  Number of Diagnoses or Management Options  Diagnosis management comments: In summary this is a 33-year-old male patient who presents for evaluation.  He is uncooperative and refusing to give details about his illness and is also refusing lab draw, IV.  He is attempted to punch staff several times and was last seen walking in the hallway.  He has eloped from the emergency department at this time.             Patient Care Considerations:    CHEST X-RAY: I considered ordering a chest x-ray however no respiratory distress      Consultants/Shared Management Plan:    None    Social Determinants of Health:          Disposition and Care Coordination:    Eloped: This patient has left the emergency department or waiting room with no communication to myself, nursing or administrative staff. There was no opportunity to discuss the patient's decision to leave, provide medical advice or discuss alternatives to. The staff has made efforts to locate patient without success.        Final diagnoses:   Illness        ED Disposition       ED Disposition   Eloped    Condition   --    Comment   --               This medical record created using voice recognition software.             Maverick Quintero MD  08/15/23 2536

## 2025-06-18 NOTE — ANESTHESIA PREPROCEDURE EVALUATION
Anesthesia Evaluation     Patient summary reviewed and Nursing notes reviewed   no history of anesthetic complications:  NPO Solid Status: > 8 hours  NPO Liquid Status: > 2 hours           Airway   Mallampati: II  TM distance: >3 FB  Neck ROM: full  No difficulty expected  Dental      Pulmonary - normal exam    breath sounds clear to auscultation  (+) a smoker Current,   Cardiovascular - negative cardio ROS and normal exam  Exercise tolerance: good (4-7 METS)    Rhythm: regular  Rate: normal        Neuro/Psych- negative ROS  GI/Hepatic/Renal/Endo - negative ROS     ROS Comment: Crohn's     Musculoskeletal     Abdominal    Substance History      OB/GYN          Other        ROS/Med Hx Other: PAT Nursing Notes unavailable.                   Anesthesia Plan    ASA 2     general   total IV anesthesia    Anesthetic plan, risks, benefits, and alternatives have been provided, discussed and informed consent has been obtained with: patient.        CODE STATUS:        LOV 8/22/24 Dx :  1. Screening exam for skin cancer  2. Hair loss  3. Rosacea  -     Azelaic Acid  4. Neoplasm of uncertain behavior

## (undated) DEVICE — Device

## (undated) DEVICE — Device: Brand: DEFENDO AIR/WATER/SUCTION AND BIOPSY VALVE

## (undated) DEVICE — SOLIDIFIER LIQLOC PLS 1500CC BT

## (undated) DEVICE — SOL IRRG H2O PL/BG 1000ML STRL